# Patient Record
Sex: FEMALE | Race: BLACK OR AFRICAN AMERICAN | Employment: OTHER | URBAN - METROPOLITAN AREA
[De-identification: names, ages, dates, MRNs, and addresses within clinical notes are randomized per-mention and may not be internally consistent; named-entity substitution may affect disease eponyms.]

---

## 2017-04-19 ENCOUNTER — IP HISTORICAL/CONVERTED ENCOUNTER (OUTPATIENT)
Dept: OTHER | Age: 77
End: 2017-04-19

## 2017-05-09 ENCOUNTER — OP HISTORICAL/CONVERTED ENCOUNTER (OUTPATIENT)
Dept: OTHER | Age: 77
End: 2017-05-09

## 2017-06-07 ENCOUNTER — OP HISTORICAL/CONVERTED ENCOUNTER (OUTPATIENT)
Dept: OTHER | Age: 77
End: 2017-06-07

## 2017-09-12 ENCOUNTER — ED HISTORICAL/CONVERTED ENCOUNTER (OUTPATIENT)
Dept: OTHER | Age: 77
End: 2017-09-12

## 2017-09-25 ENCOUNTER — OP HISTORICAL/CONVERTED ENCOUNTER (OUTPATIENT)
Dept: OTHER | Age: 77
End: 2017-09-25

## 2017-10-23 ENCOUNTER — OP HISTORICAL/CONVERTED ENCOUNTER (OUTPATIENT)
Dept: OTHER | Age: 77
End: 2017-10-23

## 2019-03-01 ENCOUNTER — ED HISTORICAL/CONVERTED ENCOUNTER (OUTPATIENT)
Dept: OTHER | Age: 79
End: 2019-03-01

## 2020-02-06 ENCOUNTER — ED HISTORICAL/CONVERTED ENCOUNTER (OUTPATIENT)
Dept: OTHER | Age: 80
End: 2020-02-06

## 2020-06-15 VITALS
HEIGHT: 67 IN | RESPIRATION RATE: 18 BRPM | WEIGHT: 219 LBS | BODY MASS INDEX: 34.37 KG/M2 | DIASTOLIC BLOOD PRESSURE: 80 MMHG | SYSTOLIC BLOOD PRESSURE: 144 MMHG | OXYGEN SATURATION: 98 % | HEART RATE: 76 BPM

## 2020-06-15 PROBLEM — H93.19 TINNITUS: Status: ACTIVE | Noted: 2020-06-15

## 2020-06-15 PROBLEM — H91.90 HEARING LOSS: Status: ACTIVE | Noted: 2020-06-15

## 2020-06-15 RX ORDER — EFAVIRENZ 600 MG/1
600 TABLET, FILM COATED ORAL
COMMUNITY
End: 2022-02-03 | Stop reason: ALTCHOICE

## 2020-06-15 RX ORDER — EMTRICITABINE AND TENOFOVIR DISOPROXIL FUMARATE 200; 300 MG/1; MG/1
TABLET, FILM COATED ORAL DAILY
COMMUNITY
End: 2022-02-03 | Stop reason: ALTCHOICE

## 2020-06-15 RX ORDER — CELECOXIB 200 MG/1
CAPSULE ORAL 2 TIMES DAILY
COMMUNITY
End: 2021-06-18 | Stop reason: ALTCHOICE

## 2020-06-15 RX ORDER — FUROSEMIDE 20 MG/1
TABLET ORAL DAILY
Status: ON HOLD | COMMUNITY
End: 2021-11-02 | Stop reason: SDUPTHER

## 2020-06-15 RX ORDER — SULFAMETHOXAZOLE AND TRIMETHOPRIM 800; 160 MG/1; MG/1
1 TABLET ORAL 2 TIMES DAILY
COMMUNITY
End: 2021-11-04

## 2020-06-15 RX ORDER — ATENOLOL 50 MG/1
50 TABLET ORAL DAILY
COMMUNITY
End: 2021-11-04

## 2020-06-15 RX ORDER — POLYETHYLENE GLYCOL-3350 AND ELECTROLYTES WITH FLAVOR PACK 240; 5.84; 2.98; 6.72; 22.72 G/278.26G; G/278.26G; G/278.26G; G/278.26G; G/278.26G
4 POWDER, FOR SOLUTION ORAL
COMMUNITY
End: 2022-02-03 | Stop reason: ALTCHOICE

## 2020-06-15 RX ORDER — ATORVASTATIN CALCIUM 20 MG/1
20 TABLET, FILM COATED ORAL DAILY
COMMUNITY

## 2020-06-15 RX ORDER — METFORMIN HYDROCHLORIDE 500 MG/1
500 TABLET ORAL
COMMUNITY

## 2020-06-15 RX ORDER — ACETAMINOPHEN AND CODEINE PHOSPHATE 300; 30 MG/1; MG/1
1 TABLET ORAL
COMMUNITY
End: 2022-04-13

## 2020-06-15 RX ORDER — LOSARTAN POTASSIUM AND HYDROCHLOROTHIAZIDE 25; 100 MG/1; MG/1
1 TABLET ORAL DAILY
COMMUNITY
End: 2022-04-13

## 2020-06-15 RX ORDER — HYDROCODONE BITARTRATE AND ACETAMINOPHEN 5; 325 MG/1; MG/1
TABLET ORAL
COMMUNITY
End: 2022-04-13

## 2020-06-15 RX ORDER — OXYBUTYNIN CHLORIDE 5 MG/1
5 TABLET, EXTENDED RELEASE ORAL DAILY
COMMUNITY
End: 2022-04-13

## 2021-04-06 ENCOUNTER — OFFICE VISIT (OUTPATIENT)
Dept: INFECTIOUS DISEASES | Age: 81
End: 2021-04-06
Payer: MEDICARE

## 2021-04-06 VITALS
DIASTOLIC BLOOD PRESSURE: 82 MMHG | OXYGEN SATURATION: 98 % | BODY MASS INDEX: 31.55 KG/M2 | RESPIRATION RATE: 16 BRPM | SYSTOLIC BLOOD PRESSURE: 126 MMHG | TEMPERATURE: 98.1 F | HEART RATE: 66 BPM | HEIGHT: 67 IN | WEIGHT: 201 LBS

## 2021-04-06 DIAGNOSIS — Z79.4 TYPE 2 DIABETES MELLITUS WITH OTHER SPECIFIED COMPLICATION, WITH LONG-TERM CURRENT USE OF INSULIN (HCC): ICD-10-CM

## 2021-04-06 DIAGNOSIS — Z21 ASYMPTOMATIC HIV INFECTION, WITH NO HISTORY OF HIV-RELATED ILLNESS (HCC): Primary | ICD-10-CM

## 2021-04-06 DIAGNOSIS — E11.69 TYPE 2 DIABETES MELLITUS WITH OTHER SPECIFIED COMPLICATION, WITH LONG-TERM CURRENT USE OF INSULIN (HCC): ICD-10-CM

## 2021-04-06 PROCEDURE — 1090F PRES/ABSN URINE INCON ASSESS: CPT | Performed by: INTERNAL MEDICINE

## 2021-04-06 PROCEDURE — 1101F PT FALLS ASSESS-DOCD LE1/YR: CPT | Performed by: INTERNAL MEDICINE

## 2021-04-06 PROCEDURE — G8510 SCR DEP NEG, NO PLAN REQD: HCPCS | Performed by: INTERNAL MEDICINE

## 2021-04-06 PROCEDURE — G8427 DOCREV CUR MEDS BY ELIG CLIN: HCPCS | Performed by: INTERNAL MEDICINE

## 2021-04-06 PROCEDURE — G8536 NO DOC ELDER MAL SCRN: HCPCS | Performed by: INTERNAL MEDICINE

## 2021-04-06 PROCEDURE — G8400 PT W/DXA NO RESULTS DOC: HCPCS | Performed by: INTERNAL MEDICINE

## 2021-04-06 PROCEDURE — 99203 OFFICE O/P NEW LOW 30 MIN: CPT | Performed by: INTERNAL MEDICINE

## 2021-04-06 PROCEDURE — G8417 CALC BMI ABV UP PARAM F/U: HCPCS | Performed by: INTERNAL MEDICINE

## 2021-04-06 RX ORDER — BICTEGRAVIR SODIUM, EMTRICITABINE, AND TENOFOVIR ALAFENAMIDE FUMARATE 50; 200; 25 MG/1; MG/1; MG/1
TABLET ORAL
COMMUNITY
Start: 2021-03-10 | End: 2022-04-13

## 2021-04-06 RX ORDER — HYDROCHLOROTHIAZIDE 25 MG/1
25 TABLET ORAL DAILY
COMMUNITY
Start: 2021-03-01 | End: 2021-11-04

## 2021-04-06 RX ORDER — LOSARTAN POTASSIUM 100 MG/1
50 TABLET ORAL 2 TIMES DAILY
COMMUNITY
End: 2021-11-04

## 2021-04-06 RX ORDER — BICTEGRAVIR SODIUM, EMTRICITABINE, AND TENOFOVIR ALAFENAMIDE FUMARATE 50; 200; 25 MG/1; MG/1; MG/1
1 TABLET ORAL DAILY
Qty: 90 TAB | Refills: 3 | Status: SHIPPED | OUTPATIENT
Start: 2021-04-06 | End: 2022-04-13

## 2021-04-06 RX ORDER — AMLODIPINE BESYLATE 5 MG/1
5 TABLET ORAL DAILY
COMMUNITY
Start: 2021-03-08

## 2021-04-06 NOTE — PROGRESS NOTES
Subjective  Chen Lynch    HPI  80-y.o BF presenting to establish care for HIV mgt. She was a pt of Dr. Eusebia Stubbs who is now retired. According to pt she was diagnosed with HIV infection in 2016, and does not recall any AIDS associated infection. She mentions experiencing unintentional weight loss and generalized weakness prior to her diagnosis with HIV infection. Previous treatment records arent available to review but pt recalls being told her T cell count was < 200 at the time of diagnosis. Her initial regimen was Sustiva and Truvada, recently changed to [email or web address] She is 100% compliant w antiretroviral therapy, denies intolerable side effects. Per pt labs were recently done a month ago, and she was informed everything was fine. She is up to date w influenza and Pneumococcus vaccines, has already received COVID19 vaccine. She also has underlying DM, controlled on meds, HTN, hyperlipidemia and colon Ca. She denies acute complaints on assessment today. ROS  Review of Systems   Constitutional: Negative. Respiratory: Negative. Cardiovascular: Negative. Genitourinary: Negative. Musculoskeletal: Negative. Skin: Negative. Neurological: Negative. Psychiatric/Behavioral: Negative.         Past Medical History:   Diagnosis Date    Cancer [facility])     cancer of colon    Chronic pain     Diabetes ([facility] [address]     Hearing loss 6/15/2020    Hypercholesterolemia     Hypertension     Tinnitus 6/15/2020        Past Surgical History:   Procedure Laterality Date    HX COLONOSCOPY      HX GI      Colon surgery colostomy    HX GYN      hysterectomy        Social History     Tobacco Use    Smoking status: Never Smoker    Smokeless tobacco: Never Used   Substance Use Topics    Alcohol use: Yes     Frequency: Monthly or less     Binge frequency: Never    Drug use: Never        Family History   Problem Relation Age of Onset    Diabetes Mother         No Known Allergies     Objective  Physical Exam  Constitutional:       Appearance: Normal appearance. Cardiovascular:      Rate and Rhythm: Normal rate and regular rhythm. Pulmonary:      Effort: Pulmonary effort is normal.      Breath sounds: Normal breath sounds. Musculoskeletal: Normal range of motion. Skin:     General: Skin is warm and dry. Neurological:      General: No focal deficit present. Mental Status: She is alert and oriented to person, place, and time. Assessment & Plan  Diagnoses and all orders for this visit:    1. Asymptomatic HIV infection, with no history of HIV-related illness ([facility]  -     khkiquveg-nmqzlxhx-dlzvgmc ala (Biktarvy) tab tablet; Take 1 Tab by mouth daily. - Controlled w an undetectable HIV RNA VL per pt, prevous labs not available for review  - Refills for Biktarvy renewed   - Will repeat labs during next encounter in 3 months   - Re-emphasized compliance w cART     2.  Type 2 diabetes mellitus with other specified complication, with long-term current use of insulin ([facility]       - Mgt per primary, Dr Logan Vilma

## 2021-06-18 ENCOUNTER — HOSPITAL ENCOUNTER (EMERGENCY)
Age: 81
Discharge: HOME | End: 2021-06-18
Attending: FAMILY MEDICINE
Payer: MEDICARE

## 2021-06-18 ENCOUNTER — APPOINTMENT (OUTPATIENT)
Dept: GENERAL RADIOLOGY | Age: 81
End: 2021-06-18
Attending: FAMILY MEDICINE
Payer: MEDICARE

## 2021-06-18 VITALS
OXYGEN SATURATION: 100 % | TEMPERATURE: 98 F | HEART RATE: 65 BPM | SYSTOLIC BLOOD PRESSURE: 153 MMHG | RESPIRATION RATE: 15 BRPM | HEIGHT: 67 IN | DIASTOLIC BLOOD PRESSURE: 62 MMHG | WEIGHT: 204 LBS | BODY MASS INDEX: 32.02 KG/M2

## 2021-06-18 DIAGNOSIS — M25.511 PAIN IN JOINT OF RIGHT SHOULDER: Primary | ICD-10-CM

## 2021-06-18 PROCEDURE — 73030 X-RAY EXAM OF SHOULDER: CPT

## 2021-06-18 PROCEDURE — 99282 EMERGENCY DEPT VISIT SF MDM: CPT

## 2021-06-18 RX ORDER — NAPROXEN 375 MG/1
375 TABLET ORAL 2 TIMES DAILY WITH MEALS
Qty: 14 TABLET | Refills: 0 | Status: SHIPPED | OUTPATIENT
Start: 2021-06-18 | End: 2021-06-25

## 2021-06-18 NOTE — ED TRIAGE NOTES
Pt presents with right shoulder pain starting this morning. Pain is present with movement, especially when raising arm above head. No known trauma or injury reported. Denies SOB, CP, nausea, vomiting, or diarrhea.

## 2021-06-18 NOTE — DISCHARGE INSTRUCTIONS
Thank you! Thank you for allowing me to care for you in the emergency department. I sincerely hope that you are satisfied with your visit today. It is my goal to provide you with excellent care. Below you will find a list of your labs and imaging from your visit today. Should you have any questions regarding these results please do not hesitate to call the emergency department. Labs -   No results found for this or any previous visit (from the past 12 hour(s)). Radiologic Studies -   XR SHOULDER RT AP/LAT MIN 2 V   Final Result        CT Results  (Last 48 hours)      None          CXR Results  (Last 48 hours)      None               If you feel that you have not received excellent quality care or timely care, please ask to speak to the nurse manager. Please choose us in the future for your continued health care needs. ------------------------------------------------------------------------------------------------------------  The exam and treatment you received in the Emergency Department were for an urgent problem and are not intended as complete care. It is important that you follow-up with a doctor, nurse practitioner, or physician assistant to:  (1) confirm your diagnosis,  (2) re-evaluation of changes in your illness and treatment, and  (3) for ongoing care. If your symptoms become worse or you do not improve as expected and you are unable to reach your usual health care provider, you should return to the Emergency Department. We are available 24 hours a day. Please take your discharge instructions with you when you go to your follow-up appointment. If you have any problem arranging a follow-up appointment, contact the Emergency Department immediately. If a prescription has been provided, please have it filled as soon as possible to prevent a delay in treatment. Read the entire medication instruction sheet provided to you by the pharmacy.  If you have any questions or reservations about taking the medication due to side effects or interactions with other medications, please call your primary care physician or contact the ER to speak with the charge nurse. Make an appointment with your family doctor or the physician you were referred to for follow-up of this visit as instructed on your discharge paperwork, as this is a mandatory follow-up. Return to the ER if you are unable to be seen or if you are unable to be seen in a timely manner. If you have any problem arranging the follow-up visit, contact the Emergency Department immediately.

## 2021-06-18 NOTE — ED PROVIDER NOTES
EMERGENCY DEPARTMENT HISTORY AND PHYSICAL EXAM      Date: 6/18/2021  Patient Name: Krystin Sheppard    History of Presenting Illness     Chief Complaint   Patient presents with    Shoulder Pain       History Provided By: Patient    HPI: Krystin Sheppard, 80 y.o. female with a past medical history as documented below, presents to the ED with cc of right shoulder pain. Began this morning. Described as a constant achy feeling whenever she moves her shoulder. Alleviated at rest.  It is nonradiating. No recent injuries or falls. She has been lifting heavy groceries in the last few days. No OTC treatment. No numbness and tingling in extremities. No other complaints at this time. There are no other complaints, changes, or physical findings at this time. PCP: Connie Kapoor MD    No current facility-administered medications on file prior to encounter. Current Outpatient Medications on File Prior to Encounter   Medication Sig Dispense Refill    Biktarvy tab tablet       hydroCHLOROthiazide (HYDRODIURIL) 25 mg tablet       amLODIPine (NORVASC) 5 mg tablet       losartan (COZAAR) 100 mg tablet Take 100 mg by mouth daily.  vexajxyni-pahdyfdf-mmczkdp ala (Biktarvy) tab tablet Take 1 Tab by mouth daily. 90 Tab 3    acetaminophen-codeine (TYLENOL #3) 300-30 mg per tablet Take 1 Tab by mouth every four (4) hours as needed for Pain.  atenoloL (TENORMIN) 50 mg tablet Take  by mouth daily.  atorvastatin (LIPITOR) 20 mg tablet Take  by mouth daily.  furosemide (LASIX) 20 mg tablet Take  by mouth daily.  PEG 3350-Electrolytes (Gavilyte-C) 240-22.72-6.72 -5.84 gram solution Take 4 L by mouth now.  HYDROcodone-acetaminophen (NORCO) 5-325 mg per tablet Take  by mouth.  losartan-hydroCHLOROthiazide (HYZAAR) 100-25 mg per tablet Take 1 Tab by mouth daily.  metFORMIN (GLUCOPHAGE) 500 mg tablet Take  by mouth two (2) times daily (with meals).       sAXagliptin (Onglyza) 5 mg tab tablet Take  by mouth daily.  oxybutynin chloride XL (DITROPAN XL) 5 mg CR tablet Take 5 mg by mouth daily.  trimethoprim-sulfamethoxazole (BACTRIM DS, SEPTRA DS) 160-800 mg per tablet Take 1 Tab by mouth two (2) times a day.  efavirenz (Sustiva) 600 mg tablet Take 600 mg by mouth nightly.  emtricitabine-tenofovir, TDF, (Truvada) 200-300 mg per tablet Take  by mouth daily.  [DISCONTINUED] celecoxib (CELEBREX) 200 mg capsule Take  by mouth two (2) times a day. Past History     Past Medical History:  Past Medical History:   Diagnosis Date    Cancer [facility])     cancer of colon    Chronic pain     Diabetes ([facility] [address]     Hearing loss 6/15/2020    Hypercholesterolemia     Hypertension     Tinnitus 6/15/2020       Past Surgical History:  Past Surgical History:   Procedure Laterality Date    HX COLONOSCOPY      HX GI      Colon surgery colostomy    HX GYN      hysterectomy       Family History:  Family History   Problem Relation Age of Onset    Diabetes Mother        Social History:  Social History     Tobacco Use    Smoking status: Never Smoker    Smokeless tobacco: Never Used   Vaping Use    Vaping Use: Never used   Substance Use Topics    Alcohol use: Yes    Drug use: Never       Allergies:  No Known Allergies      Review of Systems     Review of Systems   Constitutional: Negative for chills and fever. HENT: Negative for congestion and sore throat. Eyes: Negative for photophobia and visual disturbance. Respiratory: Negative for cough and shortness of breath. Cardiovascular: Negative for chest pain and palpitations. Gastrointestinal: Negative for nausea and vomiting. Genitourinary: Negative for dysuria and flank pain. Musculoskeletal: Positive for arthralgias (right shoulder). Negative for back pain and myalgias. Skin: Negative for rash and wound. Allergic/Immunologic: Negative for environmental allergies and food allergies.    Neurological: Negative for light-headedness and headaches. All other systems reviewed and are negative. Physical Exam     Physical Exam  Vitals and nursing note reviewed. Constitutional:       Appearance: Normal appearance. She is normal weight. HENT:      Head: Normocephalic and atraumatic. Eyes:      Extraocular Movements: Extraocular movements intact. Conjunctiva/sclera: Conjunctivae normal.   Cardiovascular:      Rate and Rhythm: Normal rate and regular rhythm. Pulses: Normal pulses. Heart sounds: Normal heart sounds. Pulmonary:      Effort: Pulmonary effort is normal.      Breath sounds: Normal breath sounds. Musculoskeletal:         General: No swelling. Normal range of motion. Right shoulder: Normal.      Left shoulder: Normal.        Arms:    Skin:     General: Skin is warm. Capillary Refill: Capillary refill takes less than 2 seconds. Neurological:      General: No focal deficit present. Mental Status: She is alert and oriented to person, place, and time. Psychiatric:         Mood and Affect: Mood normal.         Behavior: Behavior normal.         Thought Content: Thought content normal.         Judgment: Judgment normal.         Lab and Diagnostic Study Results     Labs -   No results found for this or any previous visit (from the past 12 hour(s)). Radiologic Studies -   @lastxrresult@  CT Results  (Last 48 hours)    None        CXR Results  (Last 48 hours)    None            Medical Decision Making   - I am the first provider for this patient. - I reviewed the vital signs, available nursing notes, past medical history, past surgical history, family history and social history. - Initial assessment performed. The patients presenting problems have been discussed, and they are in agreement with the care plan formulated and outlined with them. I have encouraged them to ask questions as they arise throughout their visit.     Vital Signs-Reviewed the patient's vital signs. Patient Vitals for the past 12 hrs:   Temp Pulse Resp BP SpO2   06/18/21 1541 98 °F (36.7 °C) 65 15 (!) 153/62 100 %       Records Reviewed: Nursing Notes    ED Course:          Provider Notes (Medical Decision Making):     MDM  Number of Diagnoses or Management Options  Pain in joint of right shoulder  Diagnosis management comments: 4:54 PM  Patient is stable with no marked toxicity or distress. No significant findings on physical exam. I reviewed all radiographic results with the patient. Per H&P, is likely arthritic pain versus some muscular strain versus inflammation of the bone spur. She was taking Celebrex in the past but does not recall taking any in the recent months. This was likely an old prescription. all questions were answered and there are no apparent barriers to comprehension or communication. The patient verbalized agreement with the diagnosis, treatment plan, and understanding of the follow-up instructions. The patient is appropriate for discharge; leaves the Emergency Department walking with a stable gait. Patient understands to return to the ED for any new or worsening symptoms. Disposition   Disposition: Condition stable  DC- Adult Discharges: All of the diagnostic tests were reviewed and questions answered. Diagnosis, care plan and treatment options were discussed. The patient understands the instructions and will follow up as directed. The patients results have been reviewed with them. They have been counseled regarding their diagnosis. The patient verbally convey understanding and agreement of the signs, symptoms, diagnosis, treatment and prognosis and additionally agrees to follow up as recommended with their PCP in 24 - 48 hours. They also agree with the care-plan and convey that all of their questions have been answered.   I have also put together some discharge instructions for them that include: 1) educational information regarding their diagnosis, 2) how to care for their diagnosis at home, as well a 3) list of reasons why they would want to return to the ED prior to their follow-up appointment, should their condition change. Discharged    DISCHARGE PLAN:  1. Current Discharge Medication List      CONTINUE these medications which have NOT CHANGED    Details   !! Biktarvy tab tablet       hydroCHLOROthiazide (HYDRODIURIL) 25 mg tablet       amLODIPine (NORVASC) 5 mg tablet       losartan (COZAAR) 100 mg tablet Take 100 mg by mouth daily. !! qybnerkhk-mstbkdmw-xnyegfz ala (Biktarvy) tab tablet Take 1 Tab by mouth daily. Qty: 90 Tab, Refills: 3    Associated Diagnoses: Asymptomatic HIV infection, with no history of HIV-related illness (HCC)      acetaminophen-codeine (TYLENOL #3) 300-30 mg per tablet Take 1 Tab by mouth every four (4) hours as needed for Pain. atenoloL (TENORMIN) 50 mg tablet Take  by mouth daily. atorvastatin (LIPITOR) 20 mg tablet Take  by mouth daily. celecoxib (CELEBREX) 200 mg capsule Take  by mouth two (2) times a day. furosemide (LASIX) 20 mg tablet Take  by mouth daily. PEG 3350-Electrolytes (Gavilyte-C) 240-22.72-6.72 -5.84 gram solution Take 4 L by mouth now. HYDROcodone-acetaminophen (NORCO) 5-325 mg per tablet Take  by mouth.      losartan-hydroCHLOROthiazide (HYZAAR) 100-25 mg per tablet Take 1 Tab by mouth daily. metFORMIN (GLUCOPHAGE) 500 mg tablet Take  by mouth two (2) times daily (with meals). sAXagliptin (Onglyza) 5 mg tab tablet Take  by mouth daily. oxybutynin chloride XL (DITROPAN XL) 5 mg CR tablet Take 5 mg by mouth daily. trimethoprim-sulfamethoxazole (BACTRIM DS, SEPTRA DS) 160-800 mg per tablet Take 1 Tab by mouth two (2) times a day. efavirenz (Sustiva) 600 mg tablet Take 600 mg by mouth nightly. emtricitabine-tenofovir, TDF, (Truvada) 200-300 mg per tablet Take  by mouth daily. !! - Potential duplicate medications found. Please discuss with provider. 2.   Follow-up Information     Follow up With Specialties Details Why Contact Info    Seth Hurst MD Internal Medicine Schedule an appointment as soon as possible for a visit in 3 days  [address] [facility]  [phone]          3. Return to ED if worse   4. Current Discharge Medication List      START taking these medications    Details   naproxen (NAPROSYN) 375 mg tablet Take 1 Tablet by mouth two (2) times daily (with meals) for 7 days. Qty: 14 Tablet, Refills: 0  Start date: 6/18/2021, End date: 6/25/2021               Diagnosis     Clinical Impression:   1. Pain in joint of right shoulder        Attestations:    Chun Torres, DO    Please note that this dictation was completed with [employer], the computer voice recognition software. Quite often unanticipated grammatical, syntax, homophones, and other interpretive errors are inadvertently transcribed by the computer software. Please disregard these errors. Please excuse any errors that have escaped final proofreading. Thank you.

## 2021-07-06 ENCOUNTER — OFFICE VISIT (OUTPATIENT)
Dept: INFECTIOUS DISEASES | Age: 81
End: 2021-07-06
Payer: MEDICARE

## 2021-07-06 VITALS
HEIGHT: 67 IN | WEIGHT: 193 LBS | TEMPERATURE: 97.7 F | HEART RATE: 63 BPM | RESPIRATION RATE: 15 BRPM | OXYGEN SATURATION: 96 % | DIASTOLIC BLOOD PRESSURE: 80 MMHG | SYSTOLIC BLOOD PRESSURE: 126 MMHG | BODY MASS INDEX: 30.29 KG/M2

## 2021-07-06 DIAGNOSIS — B20 HIV INFECTION, UNSPECIFIED SYMPTOM STATUS (HCC): Primary | ICD-10-CM

## 2021-07-06 PROCEDURE — 1101F PT FALLS ASSESS-DOCD LE1/YR: CPT | Performed by: INTERNAL MEDICINE

## 2021-07-06 PROCEDURE — G8427 DOCREV CUR MEDS BY ELIG CLIN: HCPCS | Performed by: INTERNAL MEDICINE

## 2021-07-06 PROCEDURE — 1090F PRES/ABSN URINE INCON ASSESS: CPT | Performed by: INTERNAL MEDICINE

## 2021-07-06 PROCEDURE — G8536 NO DOC ELDER MAL SCRN: HCPCS | Performed by: INTERNAL MEDICINE

## 2021-07-06 PROCEDURE — 99213 OFFICE O/P EST LOW 20 MIN: CPT | Performed by: INTERNAL MEDICINE

## 2021-07-06 PROCEDURE — G8510 SCR DEP NEG, NO PLAN REQD: HCPCS | Performed by: INTERNAL MEDICINE

## 2021-07-06 PROCEDURE — G8400 PT W/DXA NO RESULTS DOC: HCPCS | Performed by: INTERNAL MEDICINE

## 2021-07-06 PROCEDURE — G8417 CALC BMI ABV UP PARAM F/U: HCPCS | Performed by: INTERNAL MEDICINE

## 2021-07-06 NOTE — PROGRESS NOTES
Subjective  Eleni Bates     HPI  Pleasant 80 y.o BF seen as a new pt in 04/2021 for mgt of HIV infection. She presents for a 3 month f/u, denies acute complains since last encounter. She admits to 100% compliance w cART, Biktarvy, denies experiencing adverse side effects. Pt denies acute complaints on assessment today, except for intermittent leg swelling. ROS  Review of Systems   Constitutional: Negative. HENT: Negative. Respiratory: Negative. Cardiovascular: Negative. Gastrointestinal: Negative. Genitourinary: Negative. Musculoskeletal: Negative. Skin: Negative. Neurological: Negative. Past Medical History:   Diagnosis Date    Cancer [facility])     cancer of colon    Chronic pain     Diabetes ([facility] [address]     Hearing loss 6/15/2020    Hypercholesterolemia     Hypertension     Tinnitus 6/15/2020        Past Surgical History:   Procedure Laterality Date    HX COLONOSCOPY      HX GI      Colon surgery colostomy    HX GYN      hysterectomy        Social History     Tobacco Use    Smoking status: Never Smoker    Smokeless tobacco: Never Used   Vaping Use    Vaping Use: Never used   Substance Use Topics    Alcohol use: Yes    Drug use: Never        Family History   Problem Relation Age of Onset    Diabetes Mother         No Known Allergies     Objective  Physical Exam  Constitutional:       Appearance: Normal appearance. Cardiovascular:      Rate and Rhythm: Normal rate and regular rhythm. Pulses: Normal pulses. Heart sounds: Normal heart sounds. Pulmonary:      Effort: Pulmonary effort is normal.      Breath sounds: Normal breath sounds. Abdominal:      General: Abdomen is flat. Palpations: Abdomen is soft. Musculoskeletal:         General: Swelling present. Skin:     General: Skin is warm and dry. Neurological:      Mental Status: She is alert. Assessment & Plan  Diagnoses and all orders for this visit:    1.  HIV infection, unspecified symptom status (HCC)  -     METABOLIC PANEL, COMPREHENSIVE  -     HIV-1 RNA QT BY PCR  -     LYMPHOCYTES, CD4 PERCENT AND ABSOLUTE  -Well controlled on Biktarvy with an undetectable HIV RNA VL   -Repeat routine labs as above   -Currently has enough refills on  Biktarvy, will renew once pt runs out   -Counseled on medication compliance

## 2021-07-14 LAB
ALBUMIN SERPL-MCNC: 4 G/DL (ref 3.7–4.7)
ALBUMIN/GLOB SERPL: 1.4 {RATIO} (ref 1.2–2.2)
ALP SERPL-CCNC: 74 IU/L (ref 48–121)
ALT SERPL-CCNC: 16 IU/L (ref 0–32)
AST SERPL-CCNC: 25 IU/L (ref 0–40)
BASOPHILS # BLD AUTO: 0 X10E3/UL (ref 0–0.2)
BASOPHILS NFR BLD AUTO: 1 %
BILIRUB SERPL-MCNC: 0.4 MG/DL (ref 0–1.2)
BUN SERPL-MCNC: 16 MG/DL (ref 8–27)
BUN/CREAT SERPL: 21 (ref 12–28)
CALCIUM SERPL-MCNC: 9.9 MG/DL (ref 8.7–10.3)
CD3+CD4+ CELLS # BLD: 413 /UL (ref 359–1519)
CD3+CD4+ CELLS NFR BLD: 29.5 % (ref 30.8–58.5)
CHLORIDE SERPL-SCNC: 105 MMOL/L (ref 96–106)
CO2 SERPL-SCNC: 25 MMOL/L (ref 20–29)
CREAT SERPL-MCNC: 0.78 MG/DL (ref 0.57–1)
EOSINOPHIL # BLD AUTO: 0 X10E3/UL (ref 0–0.4)
EOSINOPHIL NFR BLD AUTO: 1 %
ERYTHROCYTE [DISTWIDTH] IN BLOOD BY AUTOMATED COUNT: 14.9 % (ref 11.7–15.4)
GLOBULIN SER CALC-MCNC: 2.8 G/DL (ref 1.5–4.5)
GLUCOSE SERPL-MCNC: 97 MG/DL (ref 65–99)
HCT VFR BLD AUTO: 36.5 % (ref 34–46.6)
HGB BLD-MCNC: 11.8 G/DL (ref 11.1–15.9)
HIV1 RNA # SERPL NAA+PROBE: 260 COPIES/ML
HIV1 RNA SERPL NAA+PROBE-LOG#: 2.42 LOG10COPY/ML
IMM GRANULOCYTES # BLD AUTO: 0 X10E3/UL (ref 0–0.1)
IMM GRANULOCYTES NFR BLD AUTO: 0 %
LYMPHOCYTES # BLD AUTO: 1.4 X10E3/UL (ref 0.7–3.1)
LYMPHOCYTES NFR BLD AUTO: 34 %
MCH RBC QN AUTO: 27.8 PG (ref 26.6–33)
MCHC RBC AUTO-ENTMCNC: 32.3 G/DL (ref 31.5–35.7)
MCV RBC AUTO: 86 FL (ref 79–97)
MONOCYTES # BLD AUTO: 0.4 X10E3/UL (ref 0.1–0.9)
MONOCYTES NFR BLD AUTO: 9 %
NEUTROPHILS # BLD AUTO: 2.3 X10E3/UL (ref 1.4–7)
NEUTROPHILS NFR BLD AUTO: 55 %
PLATELET # BLD AUTO: 215 X10E3/UL (ref 150–450)
POTASSIUM SERPL-SCNC: 3.8 MMOL/L (ref 3.5–5.2)
PROT SERPL-MCNC: 6.8 G/DL (ref 6–8.5)
RBC # BLD AUTO: 4.25 X10E6/UL (ref 3.77–5.28)
SODIUM SERPL-SCNC: 142 MMOL/L (ref 134–144)
WBC # BLD AUTO: 4.1 X10E3/UL (ref 3.4–10.8)

## 2021-09-21 ENCOUNTER — TRANSCRIBE ORDER (OUTPATIENT)
Dept: SCHEDULING | Age: 81
End: 2021-09-21

## 2021-09-21 DIAGNOSIS — K46.9 HERNIA, ABDOMINAL: Primary | ICD-10-CM

## 2021-09-29 ENCOUNTER — HOSPITAL ENCOUNTER (OUTPATIENT)
Dept: CT IMAGING | Age: 81
Discharge: HOME | End: 2021-09-29
Attending: SURGERY
Payer: MEDICARE

## 2021-09-29 DIAGNOSIS — K46.9 HERNIA, ABDOMINAL: ICD-10-CM

## 2021-09-29 LAB
BUN SERPL-MCNC: 15 MG/DL (ref 6–20)
CREAT SERPL-MCNC: 0.62 MG/DL (ref 0.55–1.02)

## 2021-09-29 PROCEDURE — 74177 CT ABD & PELVIS W/CONTRAST: CPT

## 2021-09-29 PROCEDURE — 36415 COLL VENOUS BLD VENIPUNCTURE: CPT

## 2021-09-29 PROCEDURE — 82565 ASSAY OF CREATININE: CPT

## 2021-09-29 PROCEDURE — 74011000636 HC RX REV CODE- 636: Performed by: SURGERY

## 2021-09-29 PROCEDURE — 84520 ASSAY OF UREA NITROGEN: CPT

## 2021-09-29 RX ADMIN — IOPAMIDOL 100 ML: 755 INJECTION, SOLUTION INTRAVENOUS at 10:37

## 2021-10-26 ENCOUNTER — HOSPITAL ENCOUNTER (OUTPATIENT)
Dept: PREADMISSION TESTING | Age: 81
Discharge: HOME | DRG: 329 | End: 2021-10-26
Payer: MEDICARE

## 2021-10-26 VITALS
SYSTOLIC BLOOD PRESSURE: 158 MMHG | WEIGHT: 197.53 LBS | TEMPERATURE: 98.1 F | HEART RATE: 67 BPM | HEIGHT: 65 IN | RESPIRATION RATE: 16 BRPM | DIASTOLIC BLOOD PRESSURE: 71 MMHG | BODY MASS INDEX: 32.91 KG/M2

## 2021-10-26 LAB
ABO + RH BLD: NORMAL
ATRIAL RATE: 61 BPM
BLOOD GROUP ANTIBODIES SERPL: NEGATIVE
CALCULATED P AXIS, ECG09: 78 DEGREES
CALCULATED R AXIS, ECG10: -4 DEGREES
CALCULATED T AXIS, ECG11: 24 DEGREES
DIAGNOSIS, 93000: NORMAL
ERYTHROCYTE [DISTWIDTH] IN BLOOD BY AUTOMATED COUNT: 14.4 % (ref 11.5–14.5)
HCT VFR BLD AUTO: 39.2 % (ref 35–47)
HGB BLD-MCNC: 12.3 G/DL (ref 11.5–16)
MCH RBC QN AUTO: 27.5 PG (ref 26–34)
MCHC RBC AUTO-ENTMCNC: 31.4 G/DL (ref 30–36.5)
MCV RBC AUTO: 87.5 FL (ref 80–99)
NRBC # BLD: 0 K/UL (ref 0–0.01)
NRBC BLD-RTO: 0 PER 100 WBC
P-R INTERVAL, ECG05: 82 MS
PLATELET # BLD AUTO: 209 K/UL (ref 150–400)
PMV BLD AUTO: 10.1 FL (ref 8.9–12.9)
Q-T INTERVAL, ECG07: 392 MS
QRS DURATION, ECG06: 84 MS
QTC CALCULATION (BEZET), ECG08: 394 MS
RBC # BLD AUTO: 4.48 M/UL (ref 3.8–5.2)
SARS-COV-2, COV2: NORMAL
SPECIMEN EXP DATE BLD: NORMAL
VENTRICULAR RATE, ECG03: 61 BPM
WBC # BLD AUTO: 4 K/UL (ref 3.6–11)

## 2021-10-26 PROCEDURE — 86901 BLOOD TYPING SEROLOGIC RH(D): CPT

## 2021-10-26 PROCEDURE — 93005 ELECTROCARDIOGRAM TRACING: CPT

## 2021-10-26 PROCEDURE — 85027 COMPLETE CBC AUTOMATED: CPT

## 2021-10-26 PROCEDURE — U0003 INFECTIOUS AGENT DETECTION BY NUCLEIC ACID (DNA OR RNA); SEVERE ACUTE RESPIRATORY SYNDROME CORONAVIRUS 2 (SARS-COV-2) (CORONAVIRUS DISEASE [COVID-19]), AMPLIFIED PROBE TECHNIQUE, MAKING USE OF HIGH THROUGHPUT TECHNOLOGIES AS DESCRIBED BY CMS-2020-01-R: HCPCS

## 2021-10-26 RX ORDER — ACETAMINOPHEN 500 MG
500 TABLET ORAL DAILY
COMMUNITY

## 2021-10-26 NOTE — PERIOP NOTES
[id number] 's office called, spoke with Ms Sergei Quevedo  re: bowel prep. Ms Zuhair Martinez stated she will ask Dr. Zuhair Martinez and call patient with instructions if needed.

## 2021-10-26 NOTE — PERIOP NOTES
[id number] Dr. Niru Marie called, made aware of pt's history and ekg done today in PAT dept. No new orders given, stated ok to proceed with surgery as planned.

## 2021-10-27 LAB
SARS-COV-2, XPLCVT: NOT DETECTED
SOURCE, COVRS: NORMAL

## 2021-10-28 ENCOUNTER — HOSPITAL ENCOUNTER (INPATIENT)
Age: 81
LOS: 7 days | Discharge: HOME | DRG: 329 | End: 2021-11-04
Attending: SURGERY | Admitting: SURGERY
Payer: MEDICARE

## 2021-10-28 ENCOUNTER — ANESTHESIA EVENT (OUTPATIENT)
Dept: SURGERY | Age: 81
DRG: 329 | End: 2021-10-28
Payer: MEDICARE

## 2021-10-28 ENCOUNTER — ANESTHESIA (OUTPATIENT)
Dept: SURGERY | Age: 81
DRG: 329 | End: 2021-10-28
Payer: MEDICARE

## 2021-10-28 PROBLEM — Z98.890 S/P EXPLORATORY LAPAROTOMY: Status: ACTIVE | Noted: 2021-10-28

## 2021-10-28 PROBLEM — K43.3 OBSTRUCTION DUE TO PARASTOMAL HERNIA: Status: ACTIVE | Noted: 2021-10-28

## 2021-10-28 LAB
GLUCOSE BLD STRIP.AUTO-MCNC: 114 MG/DL (ref 65–117)
PERFORMED BY, TECHID: NORMAL

## 2021-10-28 PROCEDURE — 76010000139 HC OR TIME 5.5 TO 6 HR: Performed by: SURGERY

## 2021-10-28 PROCEDURE — 77030005515 HC CATH URETH FOL14 BARD -B: Performed by: SURGERY

## 2021-10-28 PROCEDURE — 76060000042 HC ANESTHESIA 5.5 TO 6 HR: Performed by: SURGERY

## 2021-10-28 PROCEDURE — 77030002966 HC SUT PDS J&J -A: Performed by: SURGERY

## 2021-10-28 PROCEDURE — 88307 TISSUE EXAM BY PATHOLOGIST: CPT

## 2021-10-28 PROCEDURE — 77030011278 HC ELECTRD LIG IMPT COVD -F: Performed by: SURGERY

## 2021-10-28 PROCEDURE — 77030008462 HC STPLR SKN PROX J&J -A: Performed by: SURGERY

## 2021-10-28 PROCEDURE — 0WQF0ZZ REPAIR ABDOMINAL WALL, OPEN APPROACH: ICD-10-PCS | Performed by: SURGERY

## 2021-10-28 PROCEDURE — 77030008463 HC STPLR SKN PROX J&J -B: Performed by: SURGERY

## 2021-10-28 PROCEDURE — 2709999900 HC NON-CHARGEABLE SUPPLY: Performed by: SURGERY

## 2021-10-28 PROCEDURE — 77030002986 HC SUT PROL J&J -A: Performed by: SURGERY

## 2021-10-28 PROCEDURE — 76210000006 HC OR PH I REC 0.5 TO 1 HR: Performed by: SURGERY

## 2021-10-28 PROCEDURE — P9045 ALBUMIN (HUMAN), 5%, 250 ML: HCPCS | Performed by: NURSE ANESTHETIST, CERTIFIED REGISTERED

## 2021-10-28 PROCEDURE — 77030002996 HC SUT SLK J&J -A: Performed by: SURGERY

## 2021-10-28 PROCEDURE — 87086 URINE CULTURE/COLONY COUNT: CPT

## 2021-10-28 PROCEDURE — 36415 COLL VENOUS BLD VENIPUNCTURE: CPT

## 2021-10-28 PROCEDURE — 0DNE0ZZ RELEASE LARGE INTESTINE, OPEN APPROACH: ICD-10-PCS | Performed by: SURGERY

## 2021-10-28 PROCEDURE — 77030002916 HC SUT ETHLN J&J -A: Performed by: SURGERY

## 2021-10-28 PROCEDURE — 88304 TISSUE EXAM BY PATHOLOGIST: CPT

## 2021-10-28 PROCEDURE — 74011000250 HC RX REV CODE- 250: Performed by: NURSE ANESTHETIST, CERTIFIED REGISTERED

## 2021-10-28 PROCEDURE — 65270000029 HC RM PRIVATE

## 2021-10-28 PROCEDURE — 0DTG0ZZ RESECTION OF LEFT LARGE INTESTINE, OPEN APPROACH: ICD-10-PCS | Performed by: SURGERY

## 2021-10-28 PROCEDURE — 74011250636 HC RX REV CODE- 250/636: Performed by: NURSE ANESTHETIST, CERTIFIED REGISTERED

## 2021-10-28 PROCEDURE — 74011000258 HC RX REV CODE- 258: Performed by: SURGERY

## 2021-10-28 PROCEDURE — 74011250636 HC RX REV CODE- 250/636: Performed by: SURGERY

## 2021-10-28 PROCEDURE — 77030031139 HC SUT VCRL2 J&J -A: Performed by: SURGERY

## 2021-10-28 PROCEDURE — 74011000250 HC RX REV CODE- 250: Performed by: SURGERY

## 2021-10-28 PROCEDURE — 0D1M0Z4 BYPASS DESCENDING COLON TO CUTANEOUS, OPEN APPROACH: ICD-10-PCS | Performed by: SURGERY

## 2021-10-28 PROCEDURE — 77030012935 HC DRSG AQUACEL BMS -B: Performed by: SURGERY

## 2021-10-28 PROCEDURE — 88302 TISSUE EXAM BY PATHOLOGIST: CPT

## 2021-10-28 PROCEDURE — 77030011264 HC ELECTRD BLD EXT COVD -A: Performed by: SURGERY

## 2021-10-28 PROCEDURE — 77030036731 HC STPLR ENDOSC J&J -F: Performed by: SURGERY

## 2021-10-28 PROCEDURE — 81001 URINALYSIS AUTO W/SCOPE: CPT

## 2021-10-28 PROCEDURE — 82962 GLUCOSE BLOOD TEST: CPT

## 2021-10-28 PROCEDURE — 77030040361 HC SLV COMPR DVT MDII -B: Performed by: SURGERY

## 2021-10-28 RX ORDER — CEFOXITIN 2 G/1
INJECTION, POWDER, FOR SOLUTION INTRAVENOUS
Status: DISPENSED
Start: 2021-10-28 | End: 2021-10-29

## 2021-10-28 RX ORDER — HYDROMORPHONE HYDROCHLORIDE 1 MG/ML
0.4 INJECTION, SOLUTION INTRAMUSCULAR; INTRAVENOUS; SUBCUTANEOUS
Status: DISCONTINUED | OUTPATIENT
Start: 2021-10-28 | End: 2021-10-30

## 2021-10-28 RX ORDER — BUPIVACAINE HYDROCHLORIDE 2.5 MG/ML
INJECTION, SOLUTION EPIDURAL; INFILTRATION; INTRACAUDAL AS NEEDED
Status: DISCONTINUED | OUTPATIENT
Start: 2021-10-28 | End: 2021-10-28 | Stop reason: HOSPADM

## 2021-10-28 RX ORDER — SODIUM CHLORIDE 0.9 % (FLUSH) 0.9 %
5-40 SYRINGE (ML) INJECTION AS NEEDED
Status: DISCONTINUED | OUTPATIENT
Start: 2021-10-28 | End: 2021-10-30

## 2021-10-28 RX ORDER — LIDOCAINE HYDROCHLORIDE 10 MG/ML
0.1 INJECTION, SOLUTION EPIDURAL; INFILTRATION; INTRACAUDAL; PERINEURAL AS NEEDED
Status: DISCONTINUED | OUTPATIENT
Start: 2021-10-28 | End: 2021-10-28 | Stop reason: HOSPADM

## 2021-10-28 RX ORDER — DIPHENHYDRAMINE HYDROCHLORIDE 50 MG/ML
12.5 INJECTION, SOLUTION INTRAMUSCULAR; INTRAVENOUS AS NEEDED
Status: ACTIVE | OUTPATIENT
Start: 2021-10-28 | End: 2021-10-28

## 2021-10-28 RX ORDER — SODIUM CHLORIDE, SODIUM LACTATE, POTASSIUM CHLORIDE, CALCIUM CHLORIDE 600; 310; 30; 20 MG/100ML; MG/100ML; MG/100ML; MG/100ML
125 INJECTION, SOLUTION INTRAVENOUS CONTINUOUS
Status: DISCONTINUED | OUTPATIENT
Start: 2021-10-28 | End: 2021-10-31

## 2021-10-28 RX ORDER — FENTANYL CITRATE 50 UG/ML
50 INJECTION, SOLUTION INTRAMUSCULAR; INTRAVENOUS
Status: DISCONTINUED | OUTPATIENT
Start: 2021-10-28 | End: 2021-10-30

## 2021-10-28 RX ORDER — SODIUM CHLORIDE, SODIUM LACTATE, POTASSIUM CHLORIDE, CALCIUM CHLORIDE 600; 310; 30; 20 MG/100ML; MG/100ML; MG/100ML; MG/100ML
INJECTION, SOLUTION INTRAVENOUS
Status: DISCONTINUED | OUTPATIENT
Start: 2021-10-28 | End: 2021-10-28 | Stop reason: HOSPADM

## 2021-10-28 RX ORDER — PROPOFOL 10 MG/ML
INJECTION, EMULSION INTRAVENOUS AS NEEDED
Status: DISCONTINUED | OUTPATIENT
Start: 2021-10-28 | End: 2021-10-28 | Stop reason: HOSPADM

## 2021-10-28 RX ORDER — ONDANSETRON 2 MG/ML
INJECTION INTRAMUSCULAR; INTRAVENOUS AS NEEDED
Status: DISCONTINUED | OUTPATIENT
Start: 2021-10-28 | End: 2021-10-28 | Stop reason: HOSPADM

## 2021-10-28 RX ORDER — ONDANSETRON 2 MG/ML
4 INJECTION INTRAMUSCULAR; INTRAVENOUS AS NEEDED
Status: DISCONTINUED | OUTPATIENT
Start: 2021-10-28 | End: 2021-10-30

## 2021-10-28 RX ORDER — ROCURONIUM BROMIDE 10 MG/ML
INJECTION, SOLUTION INTRAVENOUS AS NEEDED
Status: DISCONTINUED | OUTPATIENT
Start: 2021-10-28 | End: 2021-10-28 | Stop reason: HOSPADM

## 2021-10-28 RX ORDER — SODIUM CHLORIDE, SODIUM LACTATE, POTASSIUM CHLORIDE, CALCIUM CHLORIDE 600; 310; 30; 20 MG/100ML; MG/100ML; MG/100ML; MG/100ML
20 INJECTION, SOLUTION INTRAVENOUS CONTINUOUS
Status: DISCONTINUED | OUTPATIENT
Start: 2021-10-28 | End: 2021-10-30

## 2021-10-28 RX ORDER — ALBUMIN HUMAN 50 G/1000ML
SOLUTION INTRAVENOUS AS NEEDED
Status: DISCONTINUED | OUTPATIENT
Start: 2021-10-28 | End: 2021-10-28 | Stop reason: HOSPADM

## 2021-10-28 RX ORDER — SODIUM CHLORIDE 900 MG/100ML
INJECTION INTRAVENOUS
Status: DISPENSED
Start: 2021-10-28 | End: 2021-10-29

## 2021-10-28 RX ORDER — SODIUM CHLORIDE 0.9 % (FLUSH) 0.9 %
5-40 SYRINGE (ML) INJECTION AS NEEDED
Status: DISCONTINUED | OUTPATIENT
Start: 2021-10-28 | End: 2021-10-28 | Stop reason: HOSPADM

## 2021-10-28 RX ORDER — MIDAZOLAM HYDROCHLORIDE 1 MG/ML
0.5 INJECTION, SOLUTION INTRAMUSCULAR; INTRAVENOUS
Status: DISCONTINUED | OUTPATIENT
Start: 2021-10-28 | End: 2021-10-30

## 2021-10-28 RX ORDER — DEXAMETHASONE SODIUM PHOSPHATE 4 MG/ML
INJECTION, SOLUTION INTRA-ARTICULAR; INTRALESIONAL; INTRAMUSCULAR; INTRAVENOUS; SOFT TISSUE AS NEEDED
Status: DISCONTINUED | OUTPATIENT
Start: 2021-10-28 | End: 2021-10-28 | Stop reason: HOSPADM

## 2021-10-28 RX ORDER — EPHEDRINE SULFATE/0.9% NACL/PF 50 MG/5 ML
SYRINGE (ML) INTRAVENOUS AS NEEDED
Status: DISCONTINUED | OUTPATIENT
Start: 2021-10-28 | End: 2021-10-28 | Stop reason: HOSPADM

## 2021-10-28 RX ORDER — HYDROMORPHONE HYDROCHLORIDE 2 MG/ML
INJECTION, SOLUTION INTRAMUSCULAR; INTRAVENOUS; SUBCUTANEOUS AS NEEDED
Status: DISCONTINUED | OUTPATIENT
Start: 2021-10-28 | End: 2021-10-28 | Stop reason: HOSPADM

## 2021-10-28 RX ORDER — ONDANSETRON 2 MG/ML
4 INJECTION INTRAMUSCULAR; INTRAVENOUS
Status: DISCONTINUED | OUTPATIENT
Start: 2021-10-28 | End: 2021-11-04 | Stop reason: HOSPADM

## 2021-10-28 RX ORDER — SODIUM CHLORIDE 0.9 % (FLUSH) 0.9 %
5-40 SYRINGE (ML) INJECTION EVERY 8 HOURS
Status: DISCONTINUED | OUTPATIENT
Start: 2021-10-28 | End: 2021-10-28 | Stop reason: HOSPADM

## 2021-10-28 RX ORDER — HYDROCODONE BITARTRATE AND ACETAMINOPHEN 5; 325 MG/1; MG/1
1 TABLET ORAL AS NEEDED
Status: DISCONTINUED | OUTPATIENT
Start: 2021-10-28 | End: 2021-10-30

## 2021-10-28 RX ORDER — SODIUM CHLORIDE 0.9 % (FLUSH) 0.9 %
5-40 SYRINGE (ML) INJECTION EVERY 8 HOURS
Status: DISCONTINUED | OUTPATIENT
Start: 2021-10-28 | End: 2021-10-30

## 2021-10-28 RX ORDER — MORPHINE SULFATE 10 MG/ML
2 INJECTION, SOLUTION INTRAMUSCULAR; INTRAVENOUS
Status: DISCONTINUED | OUTPATIENT
Start: 2021-10-28 | End: 2021-10-30

## 2021-10-28 RX ORDER — LIDOCAINE HYDROCHLORIDE 20 MG/ML
INJECTION, SOLUTION EPIDURAL; INFILTRATION; INTRACAUDAL; PERINEURAL AS NEEDED
Status: DISCONTINUED | OUTPATIENT
Start: 2021-10-28 | End: 2021-10-28 | Stop reason: HOSPADM

## 2021-10-28 RX ORDER — SUCCINYLCHOLINE CHLORIDE 20 MG/ML
INJECTION INTRAMUSCULAR; INTRAVENOUS AS NEEDED
Status: DISCONTINUED | OUTPATIENT
Start: 2021-10-28 | End: 2021-10-28 | Stop reason: HOSPADM

## 2021-10-28 RX ORDER — FENTANYL CITRATE 50 UG/ML
INJECTION, SOLUTION INTRAMUSCULAR; INTRAVENOUS AS NEEDED
Status: DISCONTINUED | OUTPATIENT
Start: 2021-10-28 | End: 2021-10-28 | Stop reason: HOSPADM

## 2021-10-28 RX ORDER — NORETHINDRONE AND ETHINYL ESTRADIOL 0.5-0.035
5 KIT ORAL AS NEEDED
Status: DISCONTINUED | OUTPATIENT
Start: 2021-10-28 | End: 2021-10-30

## 2021-10-28 RX ADMIN — LIDOCAINE HYDROCHLORIDE 20 MG: 20 INJECTION, SOLUTION EPIDURAL; INFILTRATION; INTRACAUDAL; PERINEURAL at 15:50

## 2021-10-28 RX ADMIN — SODIUM CHLORIDE 2 G: 900 INJECTION INTRAVENOUS at 20:04

## 2021-10-28 RX ADMIN — Medication 10 MG: at 16:46

## 2021-10-28 RX ADMIN — PHENYLEPHRINE HYDROCHLORIDE 200 MCG: 10 INJECTION INTRAVENOUS at 20:14

## 2021-10-28 RX ADMIN — SUCCINYLCHOLINE CHLORIDE 160 MG: 20 INJECTION, SOLUTION INTRAMUSCULAR; INTRAVENOUS at 15:50

## 2021-10-28 RX ADMIN — Medication 10 ML: at 22:45

## 2021-10-28 RX ADMIN — HYDROMORPHONE HYDROCHLORIDE 1.5 MG: 2 INJECTION INTRAMUSCULAR; INTRAVENOUS; SUBCUTANEOUS at 16:36

## 2021-10-28 RX ADMIN — Medication 10 MG: at 17:20

## 2021-10-28 RX ADMIN — Medication 10 MG: at 21:04

## 2021-10-28 RX ADMIN — ROCURONIUM BROMIDE 50 MG: 50 INJECTION, SOLUTION INTRAVENOUS at 15:57

## 2021-10-28 RX ADMIN — SODIUM CHLORIDE, POTASSIUM CHLORIDE, SODIUM LACTATE AND CALCIUM CHLORIDE 20 ML/HR: 600; 310; 30; 20 INJECTION, SOLUTION INTRAVENOUS at 14:18

## 2021-10-28 RX ADMIN — SODIUM CHLORIDE, POTASSIUM CHLORIDE, SODIUM LACTATE AND CALCIUM CHLORIDE: 600; 310; 30; 20 INJECTION, SOLUTION INTRAVENOUS at 15:43

## 2021-10-28 RX ADMIN — ALBUMIN (HUMAN) 12.5 G: 12.5 INJECTION, SOLUTION INTRAVENOUS at 16:49

## 2021-10-28 RX ADMIN — PIPERACILLIN AND TAZOBACTAM 3.38 G: 3; .375 INJECTION, POWDER, LYOPHILIZED, FOR SOLUTION INTRAVENOUS at 22:45

## 2021-10-28 RX ADMIN — PHENYLEPHRINE HYDROCHLORIDE 200 MCG: 10 INJECTION INTRAVENOUS at 16:04

## 2021-10-28 RX ADMIN — ONDANSETRON 4 MG: 2 INJECTION INTRAMUSCULAR; INTRAVENOUS at 20:29

## 2021-10-28 RX ADMIN — PROPOFOL 160 MG: 10 INJECTION, EMULSION INTRAVENOUS at 15:50

## 2021-10-28 RX ADMIN — HYDROMORPHONE HYDROCHLORIDE 0.5 MG: 2 INJECTION INTRAMUSCULAR; INTRAVENOUS; SUBCUTANEOUS at 17:44

## 2021-10-28 RX ADMIN — DEXAMETHASONE SODIUM PHOSPHATE 4 MG: 4 INJECTION, SOLUTION INTRA-ARTICULAR; INTRALESIONAL; INTRAMUSCULAR; INTRAVENOUS; SOFT TISSUE at 20:31

## 2021-10-28 RX ADMIN — SODIUM CHLORIDE 2 G: 900 INJECTION INTRAVENOUS at 15:58

## 2021-10-28 RX ADMIN — PHENYLEPHRINE HYDROCHLORIDE 200 MCG: 10 INJECTION INTRAVENOUS at 20:45

## 2021-10-28 RX ADMIN — SODIUM CHLORIDE 2 G: 900 INJECTION INTRAVENOUS at 18:04

## 2021-10-28 RX ADMIN — FENTANYL CITRATE 50 MCG: 50 INJECTION, SOLUTION INTRAMUSCULAR; INTRAVENOUS at 15:50

## 2021-10-28 RX ADMIN — DEXAMETHASONE SODIUM PHOSPHATE 4 MG: 4 INJECTION, SOLUTION INTRA-ARTICULAR; INTRALESIONAL; INTRAMUSCULAR; INTRAVENOUS; SOFT TISSUE at 15:47

## 2021-10-28 RX ADMIN — SODIUM CHLORIDE, POTASSIUM CHLORIDE, SODIUM LACTATE AND CALCIUM CHLORIDE 100 ML/HR: 600; 310; 30; 20 INJECTION, SOLUTION INTRAVENOUS at 22:33

## 2021-10-28 NOTE — ANESTHESIA PREPROCEDURE EVALUATION
Relevant Problems   No relevant active problems       Anesthetic History               Review of Systems / Medical History  Patient summary reviewed, nursing notes reviewed and pertinent labs reviewed    Pulmonary                   Neuro/Psych             Comments: Hearing loss. Tinnitis Cardiovascular    Hypertension          Hyperlipidemia         GI/Hepatic/Renal     GERD          Comments: s/p Colostomy bag.   Endo/Other    Diabetes    Obesity    Comments: HIV Other Findings   Comments: Parastomal hernia           Physical Exam    Airway  Mallampati: I  TM Distance: 4 - 6 cm  Neck ROM: normal range of motion   Mouth opening: Normal     Cardiovascular    Rhythm: regular  Rate: normal         Dental      Comments: Dentures    Pulmonary  Breath sounds clear to auscultation               Abdominal  GI exam deferred       Other Findings            Anesthetic Plan    ASA: 3  Anesthesia type: general          Induction: RSI  Anesthetic plan and risks discussed with: Patient

## 2021-10-29 LAB
ANION GAP SERPL CALC-SCNC: 7 MMOL/L (ref 5–15)
APPEARANCE UR: CLEAR
BACTERIA URNS QL MICRO: NEGATIVE /HPF
BASOPHILS # BLD: 0 K/UL (ref 0–0.1)
BASOPHILS NFR BLD: 0 % (ref 0–1)
BILIRUB UR QL: NEGATIVE
BUN SERPL-MCNC: 14 MG/DL (ref 6–20)
BUN/CREAT SERPL: 20 (ref 12–20)
CA-I BLD-MCNC: 9 MG/DL (ref 8.5–10.1)
CHLORIDE SERPL-SCNC: 108 MMOL/L (ref 97–108)
CO2 SERPL-SCNC: 26 MMOL/L (ref 21–32)
COLOR UR: ABNORMAL
CREAT SERPL-MCNC: 0.7 MG/DL (ref 0.55–1.02)
DIFFERENTIAL METHOD BLD: ABNORMAL
EOSINOPHIL # BLD: 0 K/UL (ref 0–0.4)
EOSINOPHIL NFR BLD: 0 % (ref 0–7)
ERYTHROCYTE [DISTWIDTH] IN BLOOD BY AUTOMATED COUNT: 14.2 % (ref 11.5–14.5)
GLUCOSE BLD STRIP.AUTO-MCNC: 135 MG/DL (ref 65–117)
GLUCOSE BLD STRIP.AUTO-MCNC: 136 MG/DL (ref 65–117)
GLUCOSE BLD STRIP.AUTO-MCNC: 153 MG/DL (ref 65–117)
GLUCOSE BLD STRIP.AUTO-MCNC: 155 MG/DL (ref 65–117)
GLUCOSE SERPL-MCNC: 169 MG/DL (ref 65–100)
GLUCOSE UR STRIP.AUTO-MCNC: NEGATIVE MG/DL
HCT VFR BLD AUTO: 34.4 % (ref 35–47)
HGB BLD-MCNC: 11.1 G/DL (ref 11.5–16)
HGB UR QL STRIP: NEGATIVE
IMM GRANULOCYTES # BLD AUTO: 0 K/UL
IMM GRANULOCYTES NFR BLD AUTO: 0 %
KETONES UR QL STRIP.AUTO: 5 MG/DL
LEUKOCYTE ESTERASE UR QL STRIP.AUTO: NEGATIVE
LYMPHOCYTES # BLD: 0.4 K/UL (ref 0.8–3.5)
LYMPHOCYTES NFR BLD: 4 % (ref 12–49)
MCH RBC QN AUTO: 27.7 PG (ref 26–34)
MCHC RBC AUTO-ENTMCNC: 32.3 G/DL (ref 30–36.5)
MCV RBC AUTO: 85.8 FL (ref 80–99)
MONOCYTES # BLD: 0.6 K/UL (ref 0–1)
MONOCYTES NFR BLD: 6 % (ref 5–13)
MUCOUS THREADS URNS QL MICRO: ABNORMAL /LPF
NEUTS SEG # BLD: 8.3 K/UL (ref 1.8–8)
NEUTS SEG NFR BLD: 90 % (ref 32–75)
NITRITE UR QL STRIP.AUTO: NEGATIVE
NRBC # BLD: 0 K/UL (ref 0–0.01)
NRBC BLD-RTO: 0 PER 100 WBC
PERFORMED BY, TECHID: ABNORMAL
PH UR STRIP: 7 [PH] (ref 5–8)
PLATELET # BLD AUTO: 187 K/UL (ref 150–400)
PMV BLD AUTO: 10 FL (ref 8.9–12.9)
POTASSIUM SERPL-SCNC: 3.4 MMOL/L (ref 3.5–5.1)
PROT UR STRIP-MCNC: NEGATIVE MG/DL
RBC # BLD AUTO: 4.01 M/UL (ref 3.8–5.2)
RBC #/AREA URNS HPF: ABNORMAL /HPF (ref 0–5)
RBC MORPH BLD: ABNORMAL
SODIUM SERPL-SCNC: 141 MMOL/L (ref 136–145)
SP GR UR REFRACTOMETRY: 1.01 (ref 1–1.03)
UROBILINOGEN UR QL STRIP.AUTO: 0.1 EU/DL (ref 0.1–1)
WBC # BLD AUTO: 9.3 K/UL (ref 3.6–11)
WBC URNS QL MICRO: ABNORMAL /HPF (ref 0–4)

## 2021-10-29 PROCEDURE — 80048 BASIC METABOLIC PNL TOTAL CA: CPT

## 2021-10-29 PROCEDURE — 82962 GLUCOSE BLOOD TEST: CPT

## 2021-10-29 PROCEDURE — 85025 COMPLETE CBC W/AUTO DIFF WBC: CPT

## 2021-10-29 PROCEDURE — 36415 COLL VENOUS BLD VENIPUNCTURE: CPT

## 2021-10-29 PROCEDURE — 74011250637 HC RX REV CODE- 250/637: Performed by: ANESTHESIOLOGY

## 2021-10-29 PROCEDURE — 74011250636 HC RX REV CODE- 250/636: Performed by: SURGERY

## 2021-10-29 PROCEDURE — 74011000258 HC RX REV CODE- 258: Performed by: SURGERY

## 2021-10-29 PROCEDURE — 65270000029 HC RM PRIVATE

## 2021-10-29 RX ADMIN — CEFOXITIN SODIUM 2 G: 2 POWDER, FOR SOLUTION INTRAVENOUS at 09:37

## 2021-10-29 RX ADMIN — Medication 10 ML: at 05:23

## 2021-10-29 RX ADMIN — SODIUM CHLORIDE 250 ML: 9 INJECTION, SOLUTION INTRAVENOUS at 19:29

## 2021-10-29 RX ADMIN — PIPERACILLIN AND TAZOBACTAM 3.38 G: 3; .375 INJECTION, POWDER, LYOPHILIZED, FOR SOLUTION INTRAVENOUS at 22:42

## 2021-10-29 RX ADMIN — HYDROCODONE BITARTRATE AND ACETAMINOPHEN 1 TABLET: 5; 325 TABLET ORAL at 22:48

## 2021-10-29 RX ADMIN — PIPERACILLIN AND TAZOBACTAM 3.38 G: 3; .375 INJECTION, POWDER, LYOPHILIZED, FOR SOLUTION INTRAVENOUS at 15:18

## 2021-10-29 RX ADMIN — SODIUM CHLORIDE, POTASSIUM CHLORIDE, SODIUM LACTATE AND CALCIUM CHLORIDE 100 ML/HR: 600; 310; 30; 20 INJECTION, SOLUTION INTRAVENOUS at 22:47

## 2021-10-29 RX ADMIN — PIPERACILLIN AND TAZOBACTAM 3.38 G: 3; .375 INJECTION, POWDER, LYOPHILIZED, FOR SOLUTION INTRAVENOUS at 05:17

## 2021-10-29 RX ADMIN — Medication 10 ML: at 15:21

## 2021-10-29 RX ADMIN — HYDROCODONE BITARTRATE AND ACETAMINOPHEN 1 TABLET: 5; 325 TABLET ORAL at 02:26

## 2021-10-29 NOTE — PROGRESS NOTES
Problem: Falls - Risk of  Goal: *Absence of Falls  Description: Document [place] Fall Risk and appropriate interventions in the flowsheet.   Outcome: Progressing Towards Goal  Note: Fall Risk Interventions:            Medication Interventions: Bed/chair exit alarm, Patient to call before getting OOB, Teach patient to arise slowly    Elimination Interventions: Bed/chair exit alarm, Call light in reach, Patient to call for help with toileting needs

## 2021-10-29 NOTE — PROGRESS NOTES
POD # 1    Chief Complaint:      Subjective:  Ms. Josiah Lloyd is a 80y.o. year old * female S/P Exploratory  Laparotomy, extensive lysis of adhesion reciting of colostomy to right lower quadrant with repair of left parastomal hernia and midline incisional hernia and partial resection of left colon. No nausea vomiting. No fever or chills. Review of Systems:   Constitutional:  no fever,  no chills,  no sweats, No weakness, No fatigue, No decreased activity. Respiratory: No shortness of breath, No cough, No sputum production, No hemoptysis, No wheezing, No cyanosis. Cardiovascular: No chest pain, No palpitations, No bradycardia, No tachycardia, No peripheral edema, No syncope. Gastrointestinal: No nausea,  No vomiting, No diarrhea, No constipation, No heartburn, No abdominal pain. Genitourinary: No dysuria, No hematuria, No change in urine stream, No urethral discharge, No lesions. Musculoskeletal: No back pain, No neck pain, No joint pain, No muscle pain, No claudication, No decreased range of motion, No trauma. Integumentary: No rash, No pruritus, No abrasions. Neurologic: Alert and oriented X4, No abnormal balance, No headache, No confusion, No numbness, No tingling. Psychiatric: No anxiety, No depression, No rosales. Physical Exam:     Vitals & Measurements:     Wt Readings from Last 3 Encounters:   10/28/21 89.6 kg (197 lb 8.5 oz)   10/26/21 89.6 kg (197 lb 8.5 oz)   07/06/21 87.5 kg (193 lb)     Temp Readings from Last 3 Encounters:   10/29/21 99.3 °F (37.4 °C)   10/26/21 98.1 °F (36.7 °C)   07/06/21 97.7 °F (36.5 °C) (Temporal)     BP Readings from Last 3 Encounters:   10/29/21 (!) 99/45   10/26/21 (!) 158/71   07/06/21 126/80     Pulse Readings from Last 3 Encounters:   10/29/21 78   10/26/21 67   07/06/21 63      Ht Readings from Last 3 Encounters:   10/28/21 5' 5.35\" (1.66 m)   10/26/21 5' 5.35\" (1.66 m)   07/06/21 5' 7\" (1.702 m)      Date 10/28/21 0700 - 10/29/21 0659 10/29/21 0700 - 10/30/21 0659   Shift 8420-5686 3786-1549 24 Hour Total 0511-3688 6308-6003 24 Hour Total   INTAKE   I.V.(mL/kg/hr) 1900(1.8) 536(0.5) 2436(1.1)        Volume (lactated Ringers infusion)  236 236        Volume (lactated Ringers infusion) 9020 724 8293        Volume (cefOXitin (MEFOXIN) 2 g in 0.9% sodium chloride (MBP/ADV) 50 mL MBP) 50  50        Volume (albumin human 5% (BUMINATE) solution) 250  250        Volume (piperacillin-tazobactam (ZOSYN) 3.375 g in 0.9% sodium chloride (MBP/ADV) 100 mL MBP)  100 100      Shift Total(mL/kg) 1900(21.2) 536(6) 1182(84.4)      OUTPUT   Urine(mL/kg/hr) 250(0.2) 300(0.3) 550(0.3)        Urine Output 250  250        Urine Output (mL) (Urinary Catheter 10/28/21 2- way; Reynolds)  300 300      Blood  50 50        Estimated Blood Loss  50 50      Shift Total(mL/kg) 250(2.8) 350(3.9) 600(6.7)      NET 9503 511 4421      Weight (kg) 89.6 89.6 89.6 89.6 89.6 89.6       General: well appearing, no acute distress  Respiratory: normal chest wall expansion, CTA B, no r/r/w, no rubs  Cardiovascular: RRR, no m/r/g, Normal S1 and S2  Abdomen: Soft, non tender, non-distended, normal bowel sounds in all quadrants, no hepatosplenomegaly, no tympany. Incision scar: Skin intact. Stoma viable.  Minimal gas in the bag  Genitourinary: No inguinal hernia, normal external gentalia, no renal angle tenderness  Musculoskeletal: normal ROM in upper and lower extremities  Integumentary: warm, dry, and pink, with no rash, purpura, or petechia  Neurological:Cranial Nerves II-XII grossly intact, No sensory or motor deficit  Psychiatric: Cooperative with normal mood, affect, and cognition    Laboratory Values:   Recent Results (from the past 24 hour(s))   GLUCOSE, POC    Collection Time: 10/28/21 12:58 PM   Result Value Ref Range    Glucose (POC) 114 65 - 117 mg/dL    Performed by Jez Marina W/MICROSCOPIC    Collection Time: 10/28/21  5:30 PM   Result Value Ref Range    Color Yellow/Straw Appearance Clear Clear      Specific gravity 1.010 1.003 - 1.030      pH (UA) 7.0 5.0 - 8.0      Protein Negative Negative mg/dL    Glucose Negative Negative mg/dL    Ketone 5 (A) Negative mg/dL    Bilirubin Negative Negative      Blood Negative Negative      Urobilinogen 0.1 0.1 - 1.0 EU/dL    Nitrites Negative Negative      Leukocyte Esterase Negative Negative      WBC 0-4 0 - 4 /hpf    RBC 0-5 0 - 5 /hpf    Bacteria Negative Negative /hpf    Mucus Trace /lpf   GLUCOSE, POC    Collection Time: 10/29/21  8:31 AM   Result Value Ref Range    Glucose (POC) 153 (H) 65 - 117 mg/dL    Performed by Tg Balderrama    CBC WITH AUTOMATED DIFF    Collection Time: 10/29/21  9:10 AM   Result Value Ref Range    WBC 9.3 3.6 - 11.0 K/uL    RBC 4.01 3.80 - 5.20 M/uL    HGB 11.1 (L) 11.5 - 16.0 g/dL    HCT 34.4 (L) 35.0 - 47.0 %    MCV 85.8 80.0 - 99.0 FL    MCH 27.7 26.0 - 34.0 PG    MCHC 32.3 30.0 - 36.5 g/dL    RDW 14.2 11.5 - 14.5 %    PLATELET 982 885 - 144 K/uL    MPV 10.0 8.9 - 12.9 FL    NRBC 0.0 0.0  WBC    ABSOLUTE NRBC 0.00 0.00 - 0.01 K/uL    NEUTROPHILS PENDING %    LYMPHOCYTES PENDING %    MONOCYTES PENDING %    EOSINOPHILS PENDING %    BASOPHILS PENDING %    IMMATURE GRANULOCYTES PENDING %    ABS. NEUTROPHILS PENDING K/UL    ABS. LYMPHOCYTES PENDING K/UL    ABS. MONOCYTES PENDING K/UL    ABS. EOSINOPHILS PENDING K/UL    ABS. BASOPHILS PENDING K/UL    ABS. IMM. GRANS.  PENDING K/UL    DF PENDING    METABOLIC PANEL, BASIC    Collection Time: 10/29/21  9:10 AM   Result Value Ref Range    Sodium 141 136 - 145 mmol/L    Potassium 3.4 (L) 3.5 - 5.1 mmol/L    Chloride 108 97 - 108 mmol/L    CO2 26 21 - 32 mmol/L    Anion gap 7 5 - 15 mmol/L    Glucose 169 (H) 65 - 100 mg/dL    BUN 14 6 - 20 mg/dL    Creatinine 0.70 0.55 - 1.02 mg/dL    BUN/Creatinine ratio 20 12 - 20      GFR est AA >60 >60 ml/min/1.73m2    GFR est non-AA >60 >60 ml/min/1.73m2    Calcium 9.0 8.5 - 10.1 mg/dL   GLUCOSE, POC    Collection Time: 10/29/21 12:14 PM   Result Value Ref Range    Glucose (POC) 155 (H) 65 - 117 mg/dL    Performed by Marylene Goods          Radiology:   No orders to display         Assessment:  Problem List Items Addressed This Visit     None           Plan:    1. Admission  2. Diet: Sips of clears. 3. IV fluids  4. SCD  5. IS  6. Pain medications  7. Antibiotics  8. Nausea medication  9. DC Reynolds  10. To chair and ambulate. 11. Labs & Radiology: In the a.m.  12. Plan discussed with patient and family and answered all their questions. Thank you for allowing me to participate in the care of this patient.

## 2021-10-29 NOTE — PROGRESS NOTES
Received aptient from PACU per bed accompanied by PACU NOD. Patient lying on bed, not in distress, no complaints of pain. Vital signs taken and recorded.

## 2021-10-29 NOTE — OP NOTES
OPERATIVE NOTE    Patient: Bozena Mcgregor  YOB: 1940  MRN: [id number]    Date of Procedure: 10/28/2021     Pre-Op Diagnosis: INCARCERATED PARASTOMAL HERNIA & midline iNCISIONAL HERNIA    Post-Op Diagnosis: Same as preoperative diagnosis. Procedure(s):  EXPLORTORY LAPAROTOMY WITH EXTENSIVE LYSIS OF ADHESION (COMPLEX), PARTIAL LEFT COLECTOMY, RESITING OF STOMA TO RIGHT LOWER QUADRANT. REPAIR OF INCARCERATED PARASTOMAL HERNIA,     Surgeon(s):  Florentin Summers MD    Surgical Assistant: Ms. Ann Marie Vegas    Anesthesia: General/LOCAL    ANESTHESIOLOGIST: DR. CRUZ    CRNA: Ms. India Florez:  Status post abdominal perineal resection. She developed a large parastomal hernia containing descending and transverse colon. She also had some discomfort and pain. Hence she has been scheduled for an elective procedure of repairing this parastomal hernia and incisional hernia with possible Exploratory laparotomy. Risk-benefit complications were discussed and consent obtained. PROCEDURE:    Patient was taken to the operative room. Patient was laid supine. Patient received prophylactic dose of antibiotic. Patient had teds and SCD applied to both lower extremity. The left lower quadrant stoma was closed with 3-0 nylon stitches. After Reynolds catheter was placed the abdomen was prepped and draped usual sterile fashion. Timeout was completed. Local anesthesia was infiltrated. A midline incision was placed through the old incision scar. Incision deepened through subcutaneous tissue and linea alba. There was a midline incisional hernia with sac. Sac was dissected. Peritoneal cavity was entered without any complication. There was a lot of adhesions that had to be lysed. The left parastomal hernia contained part of the left colon. The left parastomal hernia sac was dissected the left colon was delivered out.   Lysis of adhesion and removal of the sac from the left parastomal site took more than an hour of operating time. The size of the opening in the left parastomal site was large and the muscle fibers and the fascia were thinned out. At this time point I realized that even placing a mesh would not hold properly and the recurrence rate might be very high. So I decided to proceed with resiting the stoma to right lower quadrant. I made an elliptical incision externally around the stoma site. Grasped with either the edges of the skin with the stoma with Allis clamp. I worked around dissected and there colostomy. This was a fairly redundant colon. I went and used a DIVYA to divide a portion of the descending colon and stoma  and submitted as a specimen. I decided to bring the remaining portion of the descending colon to the right lower quadrant site. I created a mirror image incision on the right side and went through the fascia in a cruciate fashion and split the muscle fibers as I delivered the left colon onto the right side opening. There was no tension. I held the colon with the [facility]. Even on this side the fascia seem to be very thinned out. On the left side the internal oblique and transversus abdominis trans-Allis fascia were all close together using #1 Prolene stitch in the form of simple interrupted stitches. Then I closed the external oblique aponeurosis on the left side from outside using #1 Prolene stitches in the form of simple interrupted vertical mattress stitches. The left side wound was then closed in layers. The subcutaneous layers were approximated using 3-0 Vicryl simple stitches. And skin staples applied. Midline fascia was closed from above and below using #01 loop PDS and tied off in the midline. Subtenons tissue was irrigated and closed using 3-0 Vicryl simple stitches. Skin staples were applied. The left lower quadrant incision site and the midline incision sites were covered with a blue towel.     The new right lower quadrant stoma was matured using 2-0 Vicryl simple type stitches. And a colostomy bag was applied. Patient tolerated procedure very well. There were no intraoperative complication. Counts were all correct. Patient was extubated and transferred to recovery in stable condition.     Estimated Blood Loss (mL): Minimal    Complications: None    Specimens:   ID Type Source Tests Collected by Time Destination   1 : Parastoma hernia sac Preservative Hernia Sac  Sanjay Ponce MD 10/28/2021 1901 Pathology   2 : LEFT COLON Preservative Colon, Left  Josey MD Nadiya 10/28/2021 2055 Pathology   1 : urine Urine Reynolds Specimen CULTURE, URINE Sanjay Ponce MD 10/28/2021 1917 Microbiology        Implants: NONE    Drains: nONE    Findings: AS ABOVE    Electronically Signed by Caden Molina MD on 10/28/2021 at 9:49 PM

## 2021-10-29 NOTE — BRIEF OP NOTE
Brief Postoperative Note    Patient: Miranda Thomas  YOB: 1940  MRN: [id number]    Date of Procedure: 10/28/2021     Pre-Op Diagnosis: INCARCERATED PARASTOMAL HERNIA & midline iNCISIONAL HERNIA    Post-Op Diagnosis: Same as preoperative diagnosis. Procedure(s):  EXPLORTORY LAPAROTOMY WITH EXTENSIVE LYSIS OF ADHESION (COMPLEX), PARTIAL LEFT COLECTOMY, RESITING OF STOMA TO RIGHT LOWER QUADRANT. REPAIR OF INCARCERATED PARASTOMA HERNIA,     Surgeon(s):  Sheree Goodwin MD    Surgical Assistant: None    Anesthesia: General/LOCAL    ANESTHESIOLOGIST: DR. CRUZ    CRNA: Ms. Viki Siu:  Status post abdominal perineal resection. He developed a large parastomal hernia containing descending and transverse colon. He also she also had some discomfort. Hence she has been scheduled for an elective procedure of repairing this parastomal hernia with possible Exploratory laparotomy. Risk-benefit complications were discussed and consent obtained. PROCEDURE:    Patient was taken to the operative room. Patient was laid supine. Patient received prophylactic dose of antibiotic. Patient had teds and SCD applied to both lower extremity. The left lower quadrant stoma was closed with 3-0 nylon stitches. After Reynolds catheter was placed the abdomen was prepped and draped usual sterile fashion. Timeout was completed. Local anesthesia was infiltrated. A midline incision was placed through the old incision scar. Incision deepened to space tissue and linea alba. There was a midline incisional hernia containing empty sac. Sac was dissected. And the midline incision incision was extended deeper through the subcutaneous tissue and fascia into the peritoneal cavity. Identified going into the left lower quadrant Port-A-Cath site. We to start taking on all the adhesions. Pain was controlled.   In spite of this patient continued to have pain nausea vomiting symptoms consistent with acute cholecystitis. On clinical examination she continued to have some jaundice.     Estimated Blood Loss (mL): Minimal    Complications: None    Specimens:   ID Type Source Tests Collected by Time Destination   1 : Parastoma hernia sac Preservative Hernia Sac  Jabari Ponce MD 10/28/2021 1901 Pathology   2 : LEFT COLON Preservative Colon, Left  Sophia Ann MD 10/28/2021 2055 Pathology   1 : urine Urine Reynolds Specimen CULTURE, URINE Jabari Ponce MD 10/28/2021 1917 Microbiology        Implants: NONE    Drains: nONE    Findings: AS ABOVE    Electronically Signed by Larisa Hector MD on 10/28/2021 at 9:49 PM

## 2021-10-29 NOTE — PROGRESS NOTES
Reason for Admission:  Obstruction due to parastomal hernia, S/P exploratory laparotomy                     RUR Score:     7%                Plan for utilizing home health:  none        PCP: First and Last name:  Carlyle Almanza MD     Name of Practice:    Are you a current patient: Yes/No: yes   Approximate date of last visit: September 2021   Can you participate in a virtual visit with your PCP: possibly                    Current Advanced Directive/Advance Care Plan: No Order      Healthcare Decision Maker:     Daughter Mukund Bravo [phone]    Click here to complete [address] including selection of the Healthcare Decision Maker Relationship (ie \"Primary\")                             Transition of Care Plan:       Met with patient at bedside. Lives in 1 story [facility] home with her daughter. Does not use any DMEs and attends to her own ADLs. 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 Has used home health in past when she had Cancer but does not remember company. DC plan is home self care.

## 2021-10-30 ENCOUNTER — APPOINTMENT (OUTPATIENT)
Dept: GENERAL RADIOLOGY | Age: 81
DRG: 329 | End: 2021-10-30
Attending: SURGERY
Payer: MEDICARE

## 2021-10-30 LAB
ANION GAP SERPL CALC-SCNC: 9 MMOL/L (ref 5–15)
BACTERIA SPEC CULT: NORMAL
BASOPHILS # BLD: 0 K/UL (ref 0–0.1)
BASOPHILS NFR BLD: 0 % (ref 0–1)
BUN SERPL-MCNC: 11 MG/DL (ref 6–20)
BUN/CREAT SERPL: 21 (ref 12–20)
CA-I BLD-MCNC: 9.1 MG/DL (ref 8.5–10.1)
CHLORIDE SERPL-SCNC: 110 MMOL/L (ref 97–108)
CO2 SERPL-SCNC: 24 MMOL/L (ref 21–32)
CREAT SERPL-MCNC: 0.53 MG/DL (ref 0.55–1.02)
DIFFERENTIAL METHOD BLD: ABNORMAL
EOSINOPHIL # BLD: 0 K/UL (ref 0–0.4)
EOSINOPHIL NFR BLD: 0 % (ref 0–7)
ERYTHROCYTE [DISTWIDTH] IN BLOOD BY AUTOMATED COUNT: 14.5 % (ref 11.5–14.5)
GLUCOSE BLD STRIP.AUTO-MCNC: 146 MG/DL (ref 65–117)
GLUCOSE SERPL-MCNC: 119 MG/DL (ref 65–100)
HCT VFR BLD AUTO: 32.7 % (ref 35–47)
HGB BLD-MCNC: 10.5 G/DL (ref 11.5–16)
IMM GRANULOCYTES # BLD AUTO: 0 K/UL
IMM GRANULOCYTES NFR BLD AUTO: 0 %
LYMPHOCYTES # BLD: 0.8 K/UL (ref 0.8–3.5)
LYMPHOCYTES NFR BLD: 7 % (ref 12–49)
MCH RBC QN AUTO: 27.7 PG (ref 26–34)
MCHC RBC AUTO-ENTMCNC: 32.1 G/DL (ref 30–36.5)
MCV RBC AUTO: 86.3 FL (ref 80–99)
METAMYELOCYTES NFR BLD MANUAL: 1 %
MONOCYTES # BLD: 0.1 K/UL (ref 0–1)
MONOCYTES NFR BLD: 1 % (ref 5–13)
NEUTS SEG # BLD: 10.9 K/UL (ref 1.8–8)
NEUTS SEG NFR BLD: 90 % (ref 32–75)
NRBC # BLD: 0 K/UL (ref 0–0.01)
NRBC BLD-RTO: 0 PER 100 WBC
PERFORMED BY, TECHID: ABNORMAL
PLATELET # BLD AUTO: 165 K/UL (ref 150–400)
PMV BLD AUTO: 10.4 FL (ref 8.9–12.9)
POTASSIUM SERPL-SCNC: 3.4 MMOL/L (ref 3.5–5.1)
PROMYELOCYTES NFR BLD MANUAL: 1 %
RBC # BLD AUTO: 3.79 M/UL (ref 3.8–5.2)
RBC MORPH BLD: ABNORMAL
SODIUM SERPL-SCNC: 143 MMOL/L (ref 136–145)
SPECIAL REQUESTS,SREQ: NORMAL
WBC # BLD AUTO: 12.1 K/UL (ref 3.6–11)

## 2021-10-30 PROCEDURE — 74011000258 HC RX REV CODE- 258: Performed by: SURGERY

## 2021-10-30 PROCEDURE — 82962 GLUCOSE BLOOD TEST: CPT

## 2021-10-30 PROCEDURE — 36415 COLL VENOUS BLD VENIPUNCTURE: CPT

## 2021-10-30 PROCEDURE — 74011000250 HC RX REV CODE- 250: Performed by: INTERNAL MEDICINE

## 2021-10-30 PROCEDURE — 85025 COMPLETE CBC W/AUTO DIFF WBC: CPT

## 2021-10-30 PROCEDURE — 74011250637 HC RX REV CODE- 250/637: Performed by: SURGERY

## 2021-10-30 PROCEDURE — 65270000029 HC RM PRIVATE

## 2021-10-30 PROCEDURE — 80048 BASIC METABOLIC PNL TOTAL CA: CPT

## 2021-10-30 PROCEDURE — 93005 ELECTROCARDIOGRAM TRACING: CPT

## 2021-10-30 PROCEDURE — 74011000258 HC RX REV CODE- 258: Performed by: INTERNAL MEDICINE

## 2021-10-30 PROCEDURE — 74011250636 HC RX REV CODE- 250/636: Performed by: SURGERY

## 2021-10-30 PROCEDURE — 74018 RADEX ABDOMEN 1 VIEW: CPT

## 2021-10-30 PROCEDURE — 74011250637 HC RX REV CODE- 250/637: Performed by: ANESTHESIOLOGY

## 2021-10-30 RX ORDER — DILTIAZEM HCL/D5W 125 MG/125
5 PLASTIC BAG, INJECTION (ML) INTRAVENOUS
Status: DISCONTINUED | OUTPATIENT
Start: 2021-10-30 | End: 2021-10-30

## 2021-10-30 RX ORDER — ATENOLOL 50 MG/1
50 TABLET ORAL DAILY
Status: DISCONTINUED | OUTPATIENT
Start: 2021-10-30 | End: 2021-11-01

## 2021-10-30 RX ORDER — POTASSIUM CHLORIDE 1.5 G/1.77G
40 POWDER, FOR SOLUTION ORAL
Status: COMPLETED | OUTPATIENT
Start: 2021-10-30 | End: 2021-10-30

## 2021-10-30 RX ORDER — DILTIAZEM HCL/D5W 125 MG/125
5 PLASTIC BAG, INJECTION (ML) INTRAVENOUS CONTINUOUS
Status: DISCONTINUED | OUTPATIENT
Start: 2021-10-30 | End: 2021-10-30

## 2021-10-30 RX ORDER — POTASSIUM CHLORIDE 7.45 MG/ML
10 INJECTION INTRAVENOUS ONCE
Status: COMPLETED | OUTPATIENT
Start: 2021-10-30 | End: 2021-10-30

## 2021-10-30 RX ORDER — MORPHINE SULFATE 2 MG/ML
2 INJECTION, SOLUTION INTRAMUSCULAR; INTRAVENOUS
Status: ACTIVE | OUTPATIENT
Start: 2021-10-30 | End: 2021-11-01

## 2021-10-30 RX ADMIN — PIPERACILLIN AND TAZOBACTAM 3.38 G: 3; .375 INJECTION, POWDER, LYOPHILIZED, FOR SOLUTION INTRAVENOUS at 06:04

## 2021-10-30 RX ADMIN — Medication 10 ML: at 14:00

## 2021-10-30 RX ADMIN — PIPERACILLIN AND TAZOBACTAM 3.38 G: 3; .375 INJECTION, POWDER, LYOPHILIZED, FOR SOLUTION INTRAVENOUS at 15:22

## 2021-10-30 RX ADMIN — POTASSIUM CHLORIDE 10 MEQ: 7.46 INJECTION, SOLUTION INTRAVENOUS at 21:35

## 2021-10-30 RX ADMIN — HYDROCODONE BITARTRATE AND ACETAMINOPHEN 1 TABLET: 5; 325 TABLET ORAL at 13:14

## 2021-10-30 RX ADMIN — POTASSIUM CHLORIDE 40 MEQ: 1.5 FOR SOLUTION ORAL at 11:18

## 2021-10-30 RX ADMIN — SODIUM CHLORIDE 250 ML: 9 INJECTION, SOLUTION INTRAVENOUS at 13:47

## 2021-10-30 RX ADMIN — SODIUM CHLORIDE, POTASSIUM CHLORIDE, SODIUM LACTATE AND CALCIUM CHLORIDE 125 ML/HR: 600; 310; 30; 20 INJECTION, SOLUTION INTRAVENOUS at 19:17

## 2021-10-30 RX ADMIN — ATENOLOL 50 MG: 25 TABLET ORAL at 09:35

## 2021-10-30 RX ADMIN — PIPERACILLIN AND TAZOBACTAM 3.38 G: 3; .375 INJECTION, POWDER, LYOPHILIZED, FOR SOLUTION INTRAVENOUS at 21:37

## 2021-10-30 RX ADMIN — SODIUM CHLORIDE 250 ML: 9 INJECTION, SOLUTION INTRAVENOUS at 11:18

## 2021-10-30 RX ADMIN — DILTIAZEM HYDROCHLORIDE: 5 INJECTION INTRAVENOUS at 19:01

## 2021-10-30 NOTE — PROGRESS NOTES
Patient transferred from [address] [place] [address] to be placed on cardizem drip.  Report received from Marilyn Chan

## 2021-10-30 NOTE — PROGRESS NOTES
Patient's heart rate rechecked and it was ranging in the 115s-140s. Dr. Nisha Padilla notified. Still waiting to get a telemetry monitor for patient.  Was notified by supervisor that there was none available in the hospital.

## 2021-10-30 NOTE — PROGRESS NOTES
Called Dr. Cookie Bagley and made him aware that the patient's HR was 145 on the monitor and 115 manually when taking morning vitals. He ordered a STAT EKG, CBC, BMP, KUB, started her on telemetry monitoring, and atenolol 50mg daily. He stated he would come see her soon. Will continue to monitor her closely.

## 2021-10-30 NOTE — PROGRESS NOTES
POD # 2    Chief Complaint:      Subjective:  Ms. Sara Lopez is a 80y.o. year old * female S/P Exploratory  Laparotomy, extensive lysis of adhesion resiting of colostomy to right lower quadrant with repair of left parastomal hernia and midline incisional hernia and partial resection of left colon. No nausea vomiting. Has been Tachycardic since am. No palpitation, nausea or vomiting, fever or chills. Seen by Cardiology and recommended transfer to Telemetry with IV Cardizem. Review of Systems:   Constitutional:  no fever,  no chills,  no sweats, No weakness, No fatigue, No decreased activity. Respiratory: No shortness of breath, No cough, No sputum production, No hemoptysis, No wheezing, No cyanosis. Cardiovascular: No chest pain, No palpitations, No bradycardia, No tachycardia, No peripheral edema, No syncope. Gastrointestinal: No nausea,  No vomiting, No diarrhea, No constipation, No heartburn, No abdominal pain. Genitourinary: No dysuria, No hematuria, No change in urine stream, No urethral discharge, No lesions. Musculoskeletal: No back pain, No neck pain, No joint pain, No muscle pain, No claudication, No decreased range of motion, No trauma. Integumentary: No rash, No pruritus, No abrasions. Neurologic: Alert and oriented X4, No abnormal balance, No headache, No confusion, No numbness, No tingling. Psychiatric: No anxiety, No depression, No rosales. Physical Exam:     Vitals & Measurements:     Wt Readings from Last 3 Encounters:   10/28/21 89.6 kg (197 lb 8.5 oz)   10/26/21 89.6 kg (197 lb 8.5 oz)   07/06/21 87.5 kg (193 lb)     Temp Readings from Last 3 Encounters:   10/30/21 99 °F (37.2 °C)   10/26/21 98.1 °F (36.7 °C)   07/06/21 97.7 °F (36.5 °C) (Temporal)     BP Readings from Last 3 Encounters:   10/30/21 108/62   10/26/21 (!) 158/71   07/06/21 126/80     Pulse Readings from Last 3 Encounters:   10/30/21 (!) 137   10/26/21 67   07/06/21 63      Ht Readings from Last 3 Encounters: 10/28/21 5' 5.35\" (1.66 m)   10/26/21 5' 5.35\" (1.66 m)   07/06/21 5' 7\" (1.702 m)      Date 10/29/21 0700 - 10/30/21 0659 10/30/21 0700 - 10/31/21 0659   Shift 2865-1974 9783-5449 24 Hour Total 1326-0584 0026-3659 24 Hour Total   INTAKE   P.O.  60 60        P. O.  60 60      I. V.(mL/kg/hr)  926(0.9) 926(0.4)        Volume (lactated Ringers infusion)  726 726        Volume (piperacillin-tazobactam (ZOSYN) 3.375 g in 0.9% sodium chloride (MBP/ADV) 100 mL MBP)  200 200      Shift Total(mL/kg)  986(11) 986(11)      OUTPUT   Urine(mL/kg/hr) 400(0.4)  400(0.2)        Urine Occurrence(s)  2 x 2 x        Urine Output (mL) ([REMOVED] Urinary Catheter 10/28/21 2- way; Reynolds) 400  400      Shift Total(mL/kg) 400(4.5)  400(4.5)      NET -400 986 586      Weight (kg) 89.6 89.6 89.6 89.6 89.6 89.6       General: well appearing, no acute distress, looks very comfortable. Respiratory: normal chest wall expansion, CTA B, no r/r/w, no rubs  Cardiovascular: RRR, no m/r/g, Normal S1 and S2  Abdomen: Soft, non tender, non-distended, normal bowel sounds in all quadrants, no hepatosplenomegaly, no tympany. Incision scar: Skin intact. Stoma viable. Minimal gas in the bag.   Genitourinary: No inguinal hernia, normal external gentalia, no renal angle tenderness  Musculoskeletal: normal ROM in upper and lower extremities  Integumentary: warm, dry, and pink, with no rash, purpura, or petechia  Neurological:Cranial Nerves II-XII grossly intact, No sensory or motor deficit  Psychiatric: Cooperative with normal mood, affect, and cognition    Laboratory Values:   Recent Results (from the past 24 hour(s))   GLUCOSE, POC    Collection Time: 10/29/21  4:05 PM   Result Value Ref Range    Glucose (POC) 135 (H) 65 - 117 mg/dL    Performed by Fabio Eid    GLUCOSE, POC    Collection Time: 10/29/21  9:07 PM   Result Value Ref Range    Glucose (POC) 136 (H) 65 - 117 mg/dL    Performed by AIDE SALDIVAR    CBC WITH AUTOMATED DIFF    Collection Time: 10/30/21  8:57 AM   Result Value Ref Range    WBC 12.1 (H) 3.6 - 11.0 K/uL    RBC 3.79 (L) 3.80 - 5.20 M/uL    HGB 10.5 (L) 11.5 - 16.0 g/dL    HCT 32.7 (L) 35.0 - 47.0 %    MCV 86.3 80.0 - 99.0 FL    MCH 27.7 26.0 - 34.0 PG    MCHC 32.1 30.0 - 36.5 g/dL    RDW 14.5 11.5 - 14.5 %    PLATELET 518 683 - 795 K/uL    MPV 10.4 8.9 - 12.9 FL    NRBC 0.0 0.0  WBC    ABSOLUTE NRBC 0.00 0.00 - 0.01 K/uL    NEUTROPHILS 90 (H) 32 - 75 %    LYMPHOCYTES 7 (L) 12 - 49 %    MONOCYTES 1 (L) 5 - 13 %    EOSINOPHILS 0 0 - 7 %    BASOPHILS 0 0 - 1 %    METAMYELOCYTES 1 (H) 0 %    PROMYELOCYTES 1 (H) 0 %    IMMATURE GRANULOCYTES 0 %    ABS. NEUTROPHILS 10.9 (H) 1.8 - 8.0 K/UL    ABS. LYMPHOCYTES 0.8 0.8 - 3.5 K/UL    ABS. MONOCYTES 0.1 0.0 - 1.0 K/UL    ABS. EOSINOPHILS 0.0 0.0 - 0.4 K/UL    ABS. BASOPHILS 0.0 0.0 - 0.1 K/UL    ABS. IMM. GRANS. 0.0 K/UL    DF Manual      RBC COMMENTS Microcytosis  1+       METABOLIC PANEL, BASIC    Collection Time: 10/30/21  8:57 AM   Result Value Ref Range    Sodium 143 136 - 145 mmol/L    Potassium 3.4 (L) 3.5 - 5.1 mmol/L    Chloride 110 (H) 97 - 108 mmol/L    CO2 24 21 - 32 mmol/L    Anion gap 9 5 - 15 mmol/L    Glucose 119 (H) 65 - 100 mg/dL    BUN 11 6 - 20 mg/dL    Creatinine 0.53 (L) 0.55 - 1.02 mg/dL    BUN/Creatinine ratio 21 (H) 12 - 20      GFR est AA >60 >60 ml/min/1.73m2    GFR est non-AA >60 >60 ml/min/1.73m2    Calcium 9.1 8.5 - 10.1 mg/dL         Radiology:   XR ABD (KUB)   Final Result   Findings/impression:      Gaseous prominence of bowel throughout the abdomen, suggesting ileus or   low-grade obstruction. No supine evidence of pneumoperitoneum. Skin staples noted in the right lower quadrant/pelvis. Right pleural effusion noted. No acute osseous abnormality identified.                   Assessment:  Problem List Items Addressed This Visit     None       Large Incarcerated Parastomal hernia with incisional hernia  S/P Exploratory  Laparotomy, extensive lysis of adhesion resiting of colostomy to right lower quadrant with repair of left parastomal hernia and midline incisional hernia and partial resection of left colon, POD #2        Plan:    1. Admission  2. Diet: Sips of clears. 3. IV fluids  4. SCD  5. IS  6. Pain medications  7. Antibiotics: Zosyn  8. Nausea medication  9. To chair and ambulate. 10. Labs & Radiology: In the a.[facility] 22 Cardiology Consultation. 12. Plan discussed with patient and family and answered all their questions. Thank you for allowing me to participate in the care of this patient.

## 2021-10-30 NOTE — CONSULTS
Consult    NAME: Richie Baugh   :  1940   MRN:  [id number]     Date/Time:  10/30/2021 11:28 AM    Patient PCP: Luis Carlin MD  ________________________________________________________________________     Assessment:   Primary cardiologist: None    PROBLEM LIST:  1.  Status post exploratory laparotomy with extensive lysis of adhesion  2. History of colon cancer  3. Chronic pain  4. Resting sinus tachycardia  5. Hypertension  6. Dyslipidemia  7. Type 2 diabetes  8. Human immunodeficiency virus infection (HIV)  9. Tinnitus  10. Hearing impaired    11. Mild leukocytosis   12. Anemia  13. Electrolyte abnormalities (hypokalemia, and hyperchloremia)        []        High complexity decision making was performed        Subjective:   CHIEF COMPLAINT:     HISTORY OF PRESENT ILLNESS:     This 61-year-old -American female with no known coronary disease presents for abdominal surgery. She underwent uncomplicated exploratory laparotomy with lysis of adhesions. While being monitored she is noted to have rapid heart rate. Cardiology is therefore consulted. The patient denies previous myocardial infarction. There is no history of congestive heart failure. She denies any recent cardiovascular complaints such as chest pain, pressure or tightness. Further there is no shortness of breath or dyspnea on exertion. Her telemetry monitor, and twelve-lead EKG reveals atrial flutter with rapid ventricular response.         Past Medical History:   Diagnosis Date    Cancer [facility])     cancer of colon    Chronic pain     Diabetes ([facility] [address]     Hearing loss 6/15/2020    HIV (human immunodeficiency virus infection) ([facility] [address]     Hypercholesterolemia     Hypertension     Tinnitus 6/15/2020      Past Surgical History:   Procedure Laterality Date    HX COLONOSCOPY      HX GI      Colon surgery colostomy    HX GYN      hysterectomy     No Known Allergies   Meds:  See below  Social History     Tobacco Use    Smoking status: Former Smoker    Smokeless tobacco: Never Used    Tobacco comment: pt states quit more than 20 years ago   Substance Use Topics    Alcohol use: Never      Family History   Problem Relation Age of Onset    Diabetes Mother     Cancer Father        REVIEW OF SYSTEMS:    As above, otherwise noncontributory. Objective:      Physical Exam:    Last 24hrs VS reviewed since prior progress note. Most recent are:    Visit Vitals  /62   Pulse (!) 111   Temp 99 °F (37.2 °C)   Resp 18   Ht 5' 5.35\" (1.66 m)   Wt 89.6 kg (197 lb 8.5 oz)   SpO2 93%   BMI 32.52 kg/m²       Intake/Output Summary (Last 24 hours) at 10/30/2021 1128  Last data filed at 10/30/2021 0604  Gross per 24 hour   Intake 986 ml   Output 400 ml   Net 586 ml        General Appearance: Well developed, in no acute respiratory distress. Ears/Nose/Mouth/Throat: Pupils equal and round, Hearing grossly normal.  Neck: Supple. JVP within normal limits. Carotids good upstrokes, with no bruit. Chest: Lungs clear to auscultation bilaterally. Cardiovascular: JVP is not elevated, PMI is not attempted, normal intensity S1 and S2, without S3. Abdomen: Soft, non-tender, bowel sounds are active. No organomegaly. Extremities: No edema bilaterally. Skin: Warm and dry. Neuro: CN II-XII grossly intact, Strength and sensation grossly intact. Data:      Telemetry:    EKG:  []  No new EKG for review  XR ABD (KUB)   Final Result   Findings/impression:      Gaseous prominence of bowel throughout the abdomen, suggesting ileus or   low-grade obstruction. No supine evidence of pneumoperitoneum. Skin staples noted in the right lower quadrant/pelvis. Right pleural effusion noted. No acute osseous abnormality identified. Prior to Admission medications    Medication Sig Start Date End Date Taking? Authorizing Provider   acetaminophen (Tylenol Extra Strength) 500 mg tablet Take 500 mg by mouth daily.    Yes Provider, Historical   amLODIPine (NORVASC) 5 mg tablet Take 5 mg by mouth daily. 3/8/21  Yes Provider, Historical   losartan (COZAAR) 100 mg tablet Take 50 mg by mouth two (2) times a day. Yes Provider, Historical   atenoloL (TENORMIN) 50 mg tablet Take 50 mg by mouth daily. Yes Provider, Historical   atorvastatin (LIPITOR) 20 mg tablet Take 20 mg by mouth daily. Yes Provider, Historical   losartan-hydroCHLOROthiazide (HYZAAR) 100-25 mg per tablet Take 1 Tab by mouth daily. Yes Provider, Historical   metFORMIN (GLUCOPHAGE) 500 mg tablet Take 500 mg by mouth daily (with breakfast). Yes Provider, Historical   folic acid/multivit-min/lutein (CENTRUM SILVER PO) Take 1 Tablet by mouth daily. Provider, Historical   Biktarvy tab tablet  3/10/21   Provider, Historical   hydroCHLOROthiazide (HYDRODIURIL) 25 mg tablet Take 25 mg by mouth daily. 3/1/21   Provider, Historical   icudlxngx-psiehphb-iosfnxk ala (Biktarvy) tab tablet Take 1 Tab by mouth daily. Patient not taking: Reported on 10/26/2021 4/6/21   Ute Gaviria MD   acetaminophen-codeine (TYLENOL #3) 300-30 mg per tablet Take 1 Tab by mouth every four (4) hours as needed for Pain. Patient not taking: Reported on 10/26/2021    Provider, Historical   furosemide (LASIX) 20 mg tablet Take  by mouth daily. Patient not taking: Reported on 10/26/2021    Provider, Historical   PEG 3350-Electrolytes (Gavilyte-C) 240-22.72-6.72 -5.84 gram solution Take 4 L by mouth now. Patient not taking: Reported on 10/26/2021    Provider, Historical   HYDROcodone-acetaminophen (NORCO) 5-325 mg per tablet Take  by mouth. Patient not taking: Reported on 10/26/2021    Provider, Historical   sAXagliptin (Onglyza) 5 mg tab tablet Take 5 mg by mouth daily. Provider, Historical   oxybutynin chloride XL (DITROPAN XL) 5 mg CR tablet Take 5 mg by mouth daily.   Patient not taking: Reported on 10/26/2021    Provider, Historical   trimethoprim-sulfamethoxazole (BACTRIM DS, SEPTRA DS) 160-800 mg per tablet Take 1 Tab by mouth two (2) times a day. Patient not taking: Reported on 10/26/2021    Provider, Historical   efavirenz (Sustiva) 600 mg tablet Take 600 mg by mouth nightly. Patient not taking: Reported on 10/26/2021    Provider, Historical   emtricitabine-tenofovir, TDF, (Truvada) 200-300 mg per tablet Take  by mouth daily. Patient not taking: Reported on 10/26/2021    Provider, Historical       Recent Results (from the past 24 hour(s))   GLUCOSE, POC    Collection Time: 10/29/21 12:14 PM   Result Value Ref Range    Glucose (POC) 155 (H) 65 - 117 mg/dL    Performed by Jonny Thompson    GLUCOSE, POC    Collection Time: 10/29/21  4:05 PM   Result Value Ref Range    Glucose (POC) 135 (H) 65 - 117 mg/dL    Performed by Jonny Thompson    GLUCOSE, POC    Collection Time: 10/29/21  9:07 PM   Result Value Ref Range    Glucose (POC) 136 (H) 65 - 117 mg/dL    Performed by AIDE SALDIVAR    CBC WITH AUTOMATED DIFF    Collection Time: 10/30/21  8:57 AM   Result Value Ref Range    WBC 12.1 (H) 3.6 - 11.0 K/uL    RBC 3.79 (L) 3.80 - 5.20 M/uL    HGB 10.5 (L) 11.5 - 16.0 g/dL    HCT 32.7 (L) 35.0 - 47.0 %    MCV 86.3 80.0 - 99.0 FL    MCH 27.7 26.0 - 34.0 PG    MCHC 32.1 30.0 - 36.5 g/dL    RDW 14.5 11.5 - 14.5 %    PLATELET 447 734 - 511 K/uL    MPV 10.4 8.9 - 12.9 FL    NRBC 0.0 0.0  WBC    ABSOLUTE NRBC 0.00 0.00 - 0.01 K/uL    NEUTROPHILS PENDING %    LYMPHOCYTES PENDING %    MONOCYTES PENDING %    EOSINOPHILS PENDING %    BASOPHILS PENDING %    IMMATURE GRANULOCYTES PENDING %    ABS. NEUTROPHILS PENDING K/UL    ABS. LYMPHOCYTES PENDING K/UL    ABS. MONOCYTES PENDING K/UL    ABS. EOSINOPHILS PENDING K/UL    ABS. BASOPHILS PENDING K/UL    ABS. IMM. GRANS.  PENDING K/UL    DF PENDING    METABOLIC PANEL, BASIC    Collection Time: 10/30/21  8:57 AM   Result Value Ref Range    Sodium 143 136 - 145 mmol/L    Potassium 3.4 (L) 3.5 - 5.1 mmol/L    Chloride 110 (H) 97 - 108 mmol/L    CO2 24 21 - 32 mmol/L Anion gap 9 5 - 15 mmol/L    Glucose 119 (H) 65 - 100 mg/dL    BUN 11 6 - 20 mg/dL    Creatinine 0.53 (L) 0.55 - 1.02 mg/dL    BUN/Creatinine ratio 21 (H) 12 - 20      GFR est AA >60 >60 ml/min/1.73m2    GFR est non-AA >60 >60 ml/min/1.73m2    Calcium 9.1 8.5 - 10.1 mg/dL           Plan:   1. Transfer to 4 W. with telemetry  2. Obtain serial cardiac enzymes  3. Continue to monitor serum electrolytes, renal function (replace, recheck serum potassium)  4. Obtain complete 2D echocardiogram  5. Start IV diltiazem once on 4 W.  6.  Stop atenolol  7.   Further recommendations depending on above results, and clinical course   Elina Glover MD

## 2021-10-31 ENCOUNTER — APPOINTMENT (OUTPATIENT)
Dept: GENERAL RADIOLOGY | Age: 81
DRG: 329 | End: 2021-10-31
Attending: INTERNAL MEDICINE
Payer: MEDICARE

## 2021-10-31 LAB
ANION GAP SERPL CALC-SCNC: 7 MMOL/L (ref 5–15)
ATRIAL RATE: 290 BPM
BUN SERPL-MCNC: 9 MG/DL (ref 6–20)
BUN/CREAT SERPL: 24 (ref 12–20)
CA-I BLD-MCNC: 9 MG/DL (ref 8.5–10.1)
CALCULATED P AXIS, ECG09: -108 DEGREES
CALCULATED R AXIS, ECG10: -9 DEGREES
CALCULATED T AXIS, ECG11: -77 DEGREES
CHLORIDE SERPL-SCNC: 110 MMOL/L (ref 97–108)
CO2 SERPL-SCNC: 25 MMOL/L (ref 21–32)
CREAT SERPL-MCNC: 0.37 MG/DL (ref 0.55–1.02)
DIAGNOSIS, 93000: NORMAL
ERYTHROCYTE [DISTWIDTH] IN BLOOD BY AUTOMATED COUNT: 14.6 % (ref 11.5–14.5)
GLUCOSE BLD STRIP.AUTO-MCNC: 117 MG/DL (ref 65–117)
GLUCOSE BLD STRIP.AUTO-MCNC: 120 MG/DL (ref 65–117)
GLUCOSE SERPL-MCNC: 141 MG/DL (ref 65–100)
HCT VFR BLD AUTO: 32.5 % (ref 35–47)
HGB BLD-MCNC: 10.7 G/DL (ref 11.5–16)
MAGNESIUM SERPL-MCNC: 1.8 MG/DL (ref 1.6–2.4)
MCH RBC QN AUTO: 28 PG (ref 26–34)
MCHC RBC AUTO-ENTMCNC: 32.9 G/DL (ref 30–36.5)
MCV RBC AUTO: 85.1 FL (ref 80–99)
NRBC # BLD: 0 K/UL (ref 0–0.01)
NRBC BLD-RTO: 0 PER 100 WBC
PERFORMED BY, TECHID: ABNORMAL
PERFORMED BY, TECHID: NORMAL
PLATELET # BLD AUTO: 193 K/UL (ref 150–400)
PMV BLD AUTO: 10.1 FL (ref 8.9–12.9)
POTASSIUM SERPL-SCNC: 3.6 MMOL/L (ref 3.5–5.1)
Q-T INTERVAL, ECG07: 282 MS
QRS DURATION, ECG06: 74 MS
QTC CALCULATION (BEZET), ECG08: 438 MS
RBC # BLD AUTO: 3.82 M/UL (ref 3.8–5.2)
SODIUM SERPL-SCNC: 142 MMOL/L (ref 136–145)
VENTRICULAR RATE, ECG03: 145 BPM
WBC # BLD AUTO: 12.3 K/UL (ref 3.6–11)

## 2021-10-31 PROCEDURE — 82962 GLUCOSE BLOOD TEST: CPT

## 2021-10-31 PROCEDURE — 74011250636 HC RX REV CODE- 250/636: Performed by: SURGERY

## 2021-10-31 PROCEDURE — 74011000258 HC RX REV CODE- 258: Performed by: INTERNAL MEDICINE

## 2021-10-31 PROCEDURE — 36415 COLL VENOUS BLD VENIPUNCTURE: CPT

## 2021-10-31 PROCEDURE — 83735 ASSAY OF MAGNESIUM: CPT

## 2021-10-31 PROCEDURE — 74011000258 HC RX REV CODE- 258: Performed by: SURGERY

## 2021-10-31 PROCEDURE — 80048 BASIC METABOLIC PNL TOTAL CA: CPT

## 2021-10-31 PROCEDURE — 74011250636 HC RX REV CODE- 250/636: Performed by: INTERNAL MEDICINE

## 2021-10-31 PROCEDURE — 65270000029 HC RM PRIVATE

## 2021-10-31 PROCEDURE — 71045 X-RAY EXAM CHEST 1 VIEW: CPT

## 2021-10-31 PROCEDURE — 74011250637 HC RX REV CODE- 250/637: Performed by: SURGERY

## 2021-10-31 PROCEDURE — 85027 COMPLETE CBC AUTOMATED: CPT

## 2021-10-31 RX ORDER — ENOXAPARIN SODIUM 100 MG/ML
40 INJECTION SUBCUTANEOUS EVERY 24 HOURS
Status: DISCONTINUED | OUTPATIENT
Start: 2021-10-31 | End: 2021-11-03

## 2021-10-31 RX ORDER — FUROSEMIDE 10 MG/ML
40 INJECTION INTRAMUSCULAR; INTRAVENOUS EVERY 12 HOURS
Status: DISCONTINUED | OUTPATIENT
Start: 2021-10-31 | End: 2021-11-04 | Stop reason: HOSPADM

## 2021-10-31 RX ORDER — FUROSEMIDE 10 MG/ML
20 INJECTION INTRAMUSCULAR; INTRAVENOUS ONCE
Status: DISCONTINUED | OUTPATIENT
Start: 2021-10-31 | End: 2021-10-31

## 2021-10-31 RX ADMIN — ENOXAPARIN SODIUM 40 MG: 40 INJECTION SUBCUTANEOUS at 14:12

## 2021-10-31 RX ADMIN — FUROSEMIDE 40 MG: 10 INJECTION INTRAMUSCULAR; INTRAVENOUS at 14:13

## 2021-10-31 RX ADMIN — PIPERACILLIN AND TAZOBACTAM 3.38 G: 3; .375 INJECTION, POWDER, LYOPHILIZED, FOR SOLUTION INTRAVENOUS at 14:13

## 2021-10-31 RX ADMIN — ATENOLOL 50 MG: 25 TABLET ORAL at 08:44

## 2021-10-31 RX ADMIN — FUROSEMIDE 40 MG: 10 INJECTION INTRAMUSCULAR; INTRAVENOUS at 20:43

## 2021-10-31 RX ADMIN — AMIODARONE HYDROCHLORIDE 0.5 MG/MIN: 50 INJECTION, SOLUTION INTRAVENOUS at 20:33

## 2021-10-31 RX ADMIN — PIPERACILLIN AND TAZOBACTAM 3.38 G: 3; .375 INJECTION, POWDER, LYOPHILIZED, FOR SOLUTION INTRAVENOUS at 21:14

## 2021-10-31 RX ADMIN — AMIODARONE HYDROCHLORIDE 1 MG/MIN: 50 INJECTION, SOLUTION INTRAVENOUS at 12:28

## 2021-10-31 RX ADMIN — PIPERACILLIN AND TAZOBACTAM 3.38 G: 3; .375 INJECTION, POWDER, LYOPHILIZED, FOR SOLUTION INTRAVENOUS at 05:20

## 2021-10-31 NOTE — PROGRESS NOTES
POD # 3    Chief Complaint:      Subjective:  Ms. Isabella Odell is a 80y.o. year old * female S/P Exploratory  Laparotomy, extensive lysis of adhesion resiting of colostomy to right lower quadrant with repair of left parastomal hernia and midline incisional hernia and partial resection of left colon. Still tachycardic and Dr. Giselle Fraser has switched from Cardizem to amiodarone infusion. She is slightly tachypneic on nasal oxygen. Ostomy had some stool and gas this morning. No nausea /vomiting. No fever or chills. No abdominal pain    Transthoracic echocardiogram still pending. Review of Systems:   Constitutional:  no fever,  no chills,  no sweats, No weakness, No fatigue, No decreased activity. Respiratory:  shortness of breath, No cough, No sputum production, No hemoptysis, No wheezing, No cyanosis. Cardiovascular: No chest pain, No palpitations, No bradycardia,  tachycardia, No peripheral edema, No syncope. Gastrointestinal: No nausea,  No vomiting, No diarrhea, No constipation, No heartburn, No abdominal pain. Genitourinary: No dysuria, No hematuria, No change in urine stream, No urethral discharge, No lesions. Musculoskeletal: No back pain, No neck pain, No joint pain, No muscle pain, No claudication, No decreased range of motion, No trauma. Integumentary: No rash, No pruritus, No abrasions. Neurologic: Alert and oriented X4, No abnormal balance, No headache, No confusion, No numbness, No tingling. Psychiatric: No anxiety, No depression, No rosales. Physical Exam:     Vitals & Measurements:     Wt Readings from Last 3 Encounters:   10/28/21 89.6 kg (197 lb 8.5 oz)   10/26/21 89.6 kg (197 lb 8.5 oz)   07/06/21 87.5 kg (193 lb)     Temp Readings from Last 3 Encounters:   10/31/21 98.6 °F (37 °C)   10/26/21 98.1 °F (36.7 °C)   07/06/21 97.7 °F (36.5 °C) (Temporal)     BP Readings from Last 3 Encounters:   10/31/21 132/77   10/26/21 (!) 158/71   07/06/21 126/80     Pulse Readings from Last 3 Encounters:   10/31/21 (!) 107   10/26/21 67   07/06/21 63      Ht Readings from Last 3 Encounters:   10/28/21 5' 5.35\" (1.66 m)   10/26/21 5' 5.35\" (1.66 m)   07/06/21 5' 7\" (1.702 m)      Date 10/30/21 0700 - 10/31/21 0659 10/31/21 0700 - 11/01/21 0659   Shift 4662-7121 2353-2469 24 Hour Total 6575-2424 3011-5212 24 Hour Total   INTAKE   I.V.(mL/kg/hr) 7233.7(6.7) 100(0.1) 7333.7(3.4)        Volume (lactated Ringers infusion) 675.3  675.3        Volume (lactated Ringers infusion) 1408.3  1408.3        Volume (sodium chloride 0.9 % bolus infusion 250 mL) 500  500        Volume (sodium chloride 0.9 % bolus infusion 250 mL) 4550  4550        Volume (piperacillin-tazobactam (ZOSYN) 3.375 g in 0.9% sodium chloride (MBP/ADV) 100 mL MBP) 100  100        Volume (potassium chloride 10 mEq in 100 ml IVPB)  100 100      Shift Total(mL/kg) 7233. 7(80.7) 100(1.1) 7333. 7(81.8)      OUTPUT   Urine(mL/kg/hr)  200(0.2) 200(0.1)        Urine Voided  200 200        Urine Occurrence(s) 4 x  4 x      Stool    200  200     Output (ml) (Colostomy Right ;Mid Abdomen)    200  200   Shift Total(mL/kg)  663(6.3) 414(1.0) 200(2.2)  200(2.2)   NET 7233.7 -100 7133.7 -200  -200   Weight (kg) 89.6 89.6 89.6 89.6 89.6 89.6       General: well appearing, no acute distress, looks very comfortable. Respiratory: normal chest wall expansion, CTA B, no r/r/w, no rubs, tachypneic  Cardiovascular: Irregularly irregular, no m/r/g, Normal S1 and S2  Abdomen: Soft, non tender, non-distended, normal bowel sounds in all quadrants, no hepatosplenomegaly, no tympany. Incision scar: Skin intact. Stoma viable. Gas and stool in the bag.     Genitourinary: No inguinal hernia, normal external gentalia, no renal angle tenderness  Musculoskeletal: normal ROM in upper and lower extremities  Integumentary: warm, dry, and pink, with no rash, purpura, or petechia  Neurological:Cranial Nerves II-XII grossly intact, No sensory or motor deficit  Psychiatric: Cooperative with normal mood, affect, and cognition    Laboratory Values:   Recent Results (from the past 24 hour(s))   GLUCOSE, POC    Collection Time: 10/30/21  8:32 PM   Result Value Ref Range    Glucose (POC) 146 (H) 65 - 117 mg/dL    Performed by Bria Jimenez, POC    Collection Time: 10/31/21  8:07 AM   Result Value Ref Range    Glucose (POC) 117 65 - 117 mg/dL    Performed by David Pereyra    GLUCOSE, POC    Collection Time: 10/31/21 11:22 AM   Result Value Ref Range    Glucose (POC) 120 (H) 65 - 117 mg/dL    Performed by David Pereyra          Radiology:   XR ABD (KUB)   Final Result   Findings/impression:      Gaseous prominence of bowel throughout the abdomen, suggesting ileus or   low-grade obstruction. No supine evidence of pneumoperitoneum. Skin staples noted in the right lower quadrant/pelvis. Right pleural effusion noted. No acute osseous abnormality identified. XR CHEST PORT    (Results Pending)         Assessment:  Problem List Items Addressed This Visit     None       Large Incarcerated Parastomal hernia with incisional hernia  S/P Exploratory  Laparotomy, extensive lysis of adhesion resiting of colostomy to right lower quadrant with repair of left parastomal hernia and midline incisional hernia and partial resection of left colon, POD #3        Plan:    1. Admission  2. Diet:full liquids  3. IV fluids: Reduce the infusion to 60 mL/h.  4. SCD  5. IS  6. Pain medications  7. Antibiotics: Zosyn  8. Nausea medication  9. Place purewick. 10. Add Lasix 20 mg IV stat. 11. To chair and ambulate. 12. Labs & Radiology: In the a.m.  13. Appreciate Cardiology Consultation. 15. Hospitalist consultation for medical management. 15. Discussed with RN regarding transthoracic echocardiogram to be done as soon as possible. 16. Lovenox 40 mg sq starting today. 17. Plan discussed with patient and family and answered all their questions.      Thank you for allowing me to participate in the care of this patient.

## 2021-10-31 NOTE — CONSULTS
Hospitalist Progress Note    NAME: Zaire Filter   :  1940   MRN:  [id number]         Subjective:     Chief Complaint / Reason for Physician Visit    I was asked to see this patient for medical management by Dr. Gordo Hernandez, patient had presented with small bowel obstruction, status post exploratory laparotomy with extensive lysis of adhesions, postop day 3    Patient seen and examined at bedside, of note patient complaining of significant shortness of breath, currently requiring 2 L nasal cannula. Discussed with RN events overnight. Review of Systems:  Symptom Y/N Comments  Symptom Y/N Comments   Fever/Chills N   Chest Pain N    Poor Appetite N   Edema N    Cough N   Abdominal Pain N    Sputum N   Joint Pain N    SOB/FLORES Y   Pruritis/Rash N    Nausea/vomit N   Tolerating PT/OT NA    Diarrhea N   Tolerating Diet Y    Constipation N   Other       Could NOT obtain due to:      Objective:     VITALS:   Last 24hrs VS reviewed since prior progress note. Most recent are:  Patient Vitals for the past 24 hrs:   Temp Pulse Resp BP SpO2   10/31/21 1215  (!) 107  132/77 97 %   10/31/21 0830 98.6 °F (37 °C) (!) 117  122/64 98 %   10/31/21 0248 99 °F (37.2 °C) (!) 133 20 (!) 137/94    10/30/21 2358  (!) 133 20 (!) 137/94 97 %   10/30/21 1909  75 20 119/64 95 %   10/30/21 1600  (!) 134          Intake/Output Summary (Last 24 hours) at 10/31/2021 1307  Last data filed at 10/31/2021 0710  Gross per 24 hour   Intake 6420.66 ml   Output 400 ml   Net 6020.66 ml        PHYSICAL EXAM:  General: Patient appears comfortable   EENT:  EOMI. Anicteric sclerae. MMM  Resp:  Decreased air entry bilaterally with appreciable bilateral bibasilar crackles  CV:  Regular  rhythm,  S1 plus S2, no murmurs rubs or gallop, no edema  GI:  Soft, Non distended, Non tender. +Bowel sounds  Neurologic:  Alert and oriented X 3, normal speech,   Psych:   Good insight. Not anxious nor agitated  Skin:  No rashes.   No jaundice    Procedures: see electronic medical records for all procedures/Xrays and details which were not copied into this note but were reviewed prior to creation of Plan. LABS:  I reviewed today's most current labs and imaging studies. Pertinent labs include:  Recent Labs     10/30/21  0857 10/29/21  0910   WBC 12.1* 9.3   HGB 10.5* 11.1*   HCT 32.7* 34.4*    187     Recent Labs     10/30/21  0857 10/29/21  0910    141   K 3.4* 3.4*   * 108   CO2 24 26   * 169*   BUN 11 14   CREA 0.53* 0.70   CA 9.1 9.0       Signed: Sergio Petit MD    CT abdomen/pelvis:IMPRESSION  1. Left ostomy site has increased in size, and now contains an enlarging  parastomal hernia of distal transverse colon. Small ascites adjacent to the  herniated transverse colon. The bowel is not obstructed. 2. Unchanged common bile duct dilation.     X-ray abdomen:IMPRESSION  Findings/impression:     Gaseous prominence of bowel throughout the abdomen, suggesting ileus or  low-grade obstruction. No supine evidence of pneumoperitoneum.     Skin staples noted in the right lower quadrant/pelvis.     Right pleural effusion noted. No acute osseous abnormality identified.     Reviewed most current lab test results and cultures  YES  Reviewed most current radiology test results   YES  Review and summation of old records today    NO  Reviewed patient's current orders and MAR    YES  PMH/SH reviewed - no change compared to H&P      Assessment / Plan:  Status post exploratory laparotomy with extensive lysis of adhesions postop day 3currently patient doing well in the postoperative period, tolerating p.o., is pain-free  Continue clear liquid diet, advance diet as per general surgery recommendations  Defer management to general surgery attending    Acute respiratory failure with hypoxiaof note patient found to have shortness of breath as well as acute respiratory failure with hypoxia requiring 2 L nasal cannula for ventilatory support, based on patient's clinical presentation my inclination is patient is in volume overload, patient received significant fluid resuscitation throughout the course of this admission  Discontinue IV fluids  Stat chest x-ray  Lasix 40 mg IV twice daily  Obtain transthoracic echo  Attempt to wean oxygen  Continue to follow cardiology recommendations    Hypokalemiaobtain repeat potassium to assess for resolution    Atrial fibrillation with RVRof note patient developed significant atrial fibrillation with RVR  Continuous telemetry monitoring  Continue amiodarone gtt. Follow-up transthoracic echo  Continue to follow cardiology recommendations    ProphylaxisSCDs  FENclear liquid diet, advance diet as per general surgery recommendations, replete potassium and magnesium      I thank you Dr. Sandra Oakes for allowing me to participate in the care of your patient, the hospitalist service will continue to follow      18.5 - 24.9 Normal weight / Body mass index is 32.52 kg/m². Code status: Full  Prophylaxis: SCD's  Recommended Disposition: TBD     ________________________________________________________________________  Care Plan discussed with:    Comments   Patient x    Family      RN x    Care Manager x    Consultant  x                     x Multidiciplinary team rounds were held today with , nursing, pharmacist and clinical coordinator. Patient's plan of care was discussed; medications were reviewed and discharge planning was addressed.      ________________________________________________________________________  Total NON critical care TIME:  35   Minutes      Comments   >50% of visit spent in counseling and coordination of care x    ________________________________________________________________________  Delilah Mcneill MD

## 2021-10-31 NOTE — PROGRESS NOTES
Progress Note      10/31/2021 6:59 AM  NAME: Merline Lawman   MRN:  [id number]   Admit Diagnosis: Obstruction due to parastomal hernia [K43.3]  S/P exploratory laparotomy [Z98.890]      Problem List:   1. Status post exploratory laparotomy with extensive lysis of adhesion  2. History of colon cancer  3. Chronic pain  4. Atrial fibrillation with rapid ventricular response  5. Hypertension  6. Dyslipidemia  7. Type 2 diabetes  8. Human immunodeficiency virus infection (HIV)  9. Tinnitus  10. Hearing impaired     11. Mild leukocytosis   12. Anemia  13. Electrolyte abnormalities (hypokalemia, and hyperchloremia)       Subjective: The patient is seen and examined in room 469. There were no acute cardiovascular events reported overnight. Currently, she denies any cardiovascular complaints. She specifically denies chest pain, shortness of breath, or awareness of rapid heart rate. The patient remains in atrial fibrillation with rapid response despite IV diltiazem. Medications Personally Reviewed:    Current Facility-Administered Medications   Medication Dose Route Frequency    atenoloL (TENORMIN) tablet 50 mg  50 mg Oral DAILY    morphine injection 2 mg  2 mg IntraVENous Q3H PRN    dilTIAZem 125 mg in dextrose 5% 100 mL IVPB   IntraVENous CONTINUOUS    lactated Ringers infusion  125 mL/hr IntraVENous CONTINUOUS    piperacillin-tazobactam (ZOSYN) 3.375 g in 0.9% sodium chloride (MBP/ADV) 100 mL MBP  3.375 g IntraVENous Q8H    ondansetron (ZOFRAN) injection 4 mg  4 mg IntraVENous Q6H PRN           Objective:      Physical Exam:  Essentially unchanged  Last 24hrs VS reviewed since prior progress note.  Most recent are:    Visit Vitals  BP (!) 137/94   Pulse (!) 133   Temp 99 °F (37.2 °C)   Resp 20   Ht 5' 5.35\" (1.66 m)   Wt 89.6 kg (197 lb 8.5 oz)   SpO2 97%   BMI 32.52 kg/m²       Intake/Output Summary (Last 24 hours) at 10/31/2021 0659  Last data filed at 10/30/2021 2348  Gross per 24 hour   Intake 7333.66 ml   Output 200 ml   Net 7133.66 ml        Data Review    Telemetry: Atrial fibrillation with rapid ventricular response. EKG:   []  No new EKG for review    Lab Data Personally Reviewed:    Recent Labs     10/30/21  0857 10/29/21  0910   WBC 12.1* 9.3   HGB 10.5* 11.1*   HCT 32.7* 34.4*    187     No results for input(s): INR, PTP, APTT, INREXT in the last 72 hours. Recent Labs     10/30/21  0857 10/29/21  0910    141   K 3.4* 3.4*   * 108   CO2 24 26   BUN 11 14   CREA 0.53* 0.70   * 169*   CA 9.1 9.0     No results for input(s): CPK, CKNDX, TROIQ in the last 72 hours. No lab exists for component: CPKMB  No results found for: CHOL, CHOLX, CHLST, CHOLV, HDL, HDLP, LDL, LDLC, DLDLP, TGLX, TRIGL, TRIGP, CHHD, CHHDX    No results for input(s): AP, TBIL, TP, ALB, GLOB, GGT, AML, LPSE in the last 72 hours. No lab exists for component: SGOT, GPT, AMYP, HLPSE  No results for input(s): PH, PCO2, PO2 in the last 72 hours. Assessment/Plan:   1. Continue telemetry monitoring  2. Continue to monitor serum electrolytes, renal function  3. Check thyroid function test  4. Obtain complete 2D echocardiogram  5. Stop IV diltiazem, and atenolol    6. Start IV amiodarone  7. Consider direct-current (DC) cardioversion if chemical cardioversion is unsuccessful 24 hours  8.   Consider oral anticoagulation unless contraindicated     Javier Starkey MD

## 2021-11-01 ENCOUNTER — APPOINTMENT (OUTPATIENT)
Dept: NON INVASIVE DIAGNOSTICS | Age: 81
DRG: 329 | End: 2021-11-01
Attending: INTERNAL MEDICINE
Payer: MEDICARE

## 2021-11-01 LAB
ANION GAP SERPL CALC-SCNC: 6 MMOL/L (ref 5–15)
BUN SERPL-MCNC: 11 MG/DL (ref 6–20)
BUN/CREAT SERPL: 31 (ref 12–20)
CA-I BLD-MCNC: 8.8 MG/DL (ref 8.5–10.1)
CHLORIDE SERPL-SCNC: 108 MMOL/L (ref 97–108)
CO2 SERPL-SCNC: 29 MMOL/L (ref 21–32)
CREAT SERPL-MCNC: 0.35 MG/DL (ref 0.55–1.02)
ERYTHROCYTE [DISTWIDTH] IN BLOOD BY AUTOMATED COUNT: 14.1 % (ref 11.5–14.5)
GLUCOSE BLD STRIP.AUTO-MCNC: 117 MG/DL (ref 65–117)
GLUCOSE BLD STRIP.AUTO-MCNC: 123 MG/DL (ref 65–117)
GLUCOSE BLD STRIP.AUTO-MCNC: 195 MG/DL (ref 65–117)
GLUCOSE SERPL-MCNC: 130 MG/DL (ref 65–100)
HCT VFR BLD AUTO: 31.4 % (ref 35–47)
HGB BLD-MCNC: 10.2 G/DL (ref 11.5–16)
MAGNESIUM SERPL-MCNC: 1.6 MG/DL (ref 1.6–2.4)
MCH RBC QN AUTO: 27.6 PG (ref 26–34)
MCHC RBC AUTO-ENTMCNC: 32.5 G/DL (ref 30–36.5)
MCV RBC AUTO: 84.9 FL (ref 80–99)
NRBC # BLD: 0 K/UL (ref 0–0.01)
NRBC BLD-RTO: 0 PER 100 WBC
PERFORMED BY, TECHID: ABNORMAL
PERFORMED BY, TECHID: ABNORMAL
PERFORMED BY, TECHID: NORMAL
PLATELET # BLD AUTO: 183 K/UL (ref 150–400)
PMV BLD AUTO: 10.3 FL (ref 8.9–12.9)
POTASSIUM SERPL-SCNC: 3.1 MMOL/L (ref 3.5–5.1)
RBC # BLD AUTO: 3.7 M/UL (ref 3.8–5.2)
SODIUM SERPL-SCNC: 143 MMOL/L (ref 136–145)
WBC # BLD AUTO: 7.7 K/UL (ref 3.6–11)

## 2021-11-01 PROCEDURE — 74011000258 HC RX REV CODE- 258: Performed by: SURGERY

## 2021-11-01 PROCEDURE — 83735 ASSAY OF MAGNESIUM: CPT

## 2021-11-01 PROCEDURE — 74011250636 HC RX REV CODE- 250/636: Performed by: SURGERY

## 2021-11-01 PROCEDURE — 80048 BASIC METABOLIC PNL TOTAL CA: CPT

## 2021-11-01 PROCEDURE — 74011250636 HC RX REV CODE- 250/636: Performed by: INTERNAL MEDICINE

## 2021-11-01 PROCEDURE — 74011250637 HC RX REV CODE- 250/637: Performed by: INTERNAL MEDICINE

## 2021-11-01 PROCEDURE — 74011250637 HC RX REV CODE- 250/637: Performed by: SURGERY

## 2021-11-01 PROCEDURE — 82962 GLUCOSE BLOOD TEST: CPT

## 2021-11-01 PROCEDURE — 85027 COMPLETE CBC AUTOMATED: CPT

## 2021-11-01 PROCEDURE — 36415 COLL VENOUS BLD VENIPUNCTURE: CPT

## 2021-11-01 PROCEDURE — 65270000029 HC RM PRIVATE

## 2021-11-01 PROCEDURE — 74011250637 HC RX REV CODE- 250/637: Performed by: HOSPITALIST

## 2021-11-01 RX ORDER — ACETAMINOPHEN 325 MG/1
650 TABLET ORAL
Status: DISCONTINUED | OUTPATIENT
Start: 2021-11-01 | End: 2021-11-04 | Stop reason: HOSPADM

## 2021-11-01 RX ORDER — METOPROLOL SUCCINATE 25 MG/1
25 TABLET, EXTENDED RELEASE ORAL DAILY
Status: DISCONTINUED | OUTPATIENT
Start: 2021-11-01 | End: 2021-11-03

## 2021-11-01 RX ADMIN — FUROSEMIDE 40 MG: 10 INJECTION INTRAMUSCULAR; INTRAVENOUS at 09:04

## 2021-11-01 RX ADMIN — ENOXAPARIN SODIUM 40 MG: 40 INJECTION SUBCUTANEOUS at 15:04

## 2021-11-01 RX ADMIN — PIPERACILLIN AND TAZOBACTAM 3.38 G: 3; .375 INJECTION, POWDER, LYOPHILIZED, FOR SOLUTION INTRAVENOUS at 05:06

## 2021-11-01 RX ADMIN — ATENOLOL 50 MG: 25 TABLET ORAL at 09:04

## 2021-11-01 RX ADMIN — PIPERACILLIN AND TAZOBACTAM 3.38 G: 3; .375 INJECTION, POWDER, LYOPHILIZED, FOR SOLUTION INTRAVENOUS at 21:45

## 2021-11-01 RX ADMIN — METOPROLOL SUCCINATE 25 MG: 25 TABLET, EXTENDED RELEASE ORAL at 12:19

## 2021-11-01 RX ADMIN — FUROSEMIDE 40 MG: 10 INJECTION INTRAMUSCULAR; INTRAVENOUS at 21:45

## 2021-11-01 RX ADMIN — PIPERACILLIN AND TAZOBACTAM 3.38 G: 3; .375 INJECTION, POWDER, LYOPHILIZED, FOR SOLUTION INTRAVENOUS at 12:20

## 2021-11-01 RX ADMIN — ACETAMINOPHEN 650 MG: 325 TABLET ORAL at 21:45

## 2021-11-01 NOTE — PROGRESS NOTES
POD # 4    Chief Complaint:      Subjective:  Ms. Elizabeth Gauthier is a 80y.o. year old * female S/P Exploratory  Laparotomy, extensive lysis of adhesion resiting of colostomy to right lower quadrant with repair of left parastomal hernia and midline incisional hernia and partial resection of left colon. Back to sinus rhythm & rate controlled. Dr. Sonya Pinon has switched from Amiodarone to Metoprolol. Still slightly tachypneic on nasal oxygen 2 litres. Unfortunately Transthoracic echocardiogram has not been done for the past 3 days. Ostomy had some stool and gas this morning. No nausea /vomiting. No fever or chills. No abdominal pain. Has been tolerating full liquids. Review of Systems:   Constitutional:  no fever,  no chills,  no sweats, No weakness, No fatigue, No decreased activity. Respiratory:  shortness of breath, No cough, No sputum production, No hemoptysis, No wheezing, No cyanosis. Cardiovascular: No chest pain, No palpitations, No bradycardia,  tachycardia, No peripheral edema, No syncope. Gastrointestinal: No nausea,  No vomiting, No diarrhea, No constipation, No heartburn, No abdominal pain. Genitourinary: No dysuria, No hematuria, No change in urine stream, No urethral discharge, No lesions. Musculoskeletal: No back pain, No neck pain, No joint pain, No muscle pain, No claudication, No decreased range of motion, No trauma. Integumentary: No rash, No pruritus, No abrasions. Neurologic: Alert and oriented X4, No abnormal balance, No headache, No confusion, No numbness, No tingling. Psychiatric: No anxiety, No depression, No rosales. Physical Exam:     Vitals & Measurements:     Wt Readings from Last 3 Encounters:   10/28/21 89.6 kg (197 lb 8.5 oz)   10/26/21 89.6 kg (197 lb 8.5 oz)   07/06/21 87.5 kg (193 lb)     Temp Readings from Last 3 Encounters:   11/01/21 98.5 °F (36.9 °C)   10/26/21 98.1 °F (36.7 °C)   07/06/21 97.7 °F (36.5 °C) (Temporal)     BP Readings from Last 3 Encounters:   11/01/21 137/73   10/26/21 (!) 158/71   07/06/21 126/80     Pulse Readings from Last 3 Encounters:   11/01/21 79   10/26/21 67   07/06/21 63      Ht Readings from Last 3 Encounters:   10/28/21 5' 5.35\" (1.66 m)   10/26/21 5' 5.35\" (1.66 m)   07/06/21 5' 7\" (1.702 m)      Date 10/31/21 0700 - 11/01/21 0659 11/01/21 0700 - 11/02/21 0659   Shift 3909-1195 8010-1353 24 Hour Total 6115-1646 9426-4486 24 Hour Total   INTAKE   Shift Total(mL/kg)         OUTPUT   Urine(mL/kg/hr) 1800(1.7) 1500(1.4) 3300(1.5) 1000  1000     Urine Voided 1800 1500 3300 1000  1000     Urine Occurrence(s)  1 x 1 x      Stool 200 125 325 15  15     Output (ml) (Colostomy Right ;Mid Abdomen) 200 125 325 15  15   Shift Total(mL/kg) 2972(64.3) 1625(18.1) 3625(40.5) 1015(11.3)  1015(11.3)   NET -2000 -1625 -3625 -1015  -1015   Weight (kg) 89.6 89.6 89.6 89.6 89.6 89.6       General: well appearing, no acute distress, looks very comfortable. Respiratory: normal chest wall expansion, CTA B, no r/r/w, no rubs, tachypneic  Cardiovascular: Irregularly irregular, no m/r/g, Normal S1 and S2  Abdomen: Soft, non tender, non-distended, normal bowel sounds in all quadrants, no hepatosplenomegaly, no tympany. Incision scar: Skin intact. Stoma viable. Gas and stool in the bag.     Genitourinary: No inguinal hernia, normal external gentalia, no renal angle tenderness  Musculoskeletal: normal ROM in upper and lower extremities  Integumentary: warm, dry, and pink, with no rash, purpura, or petechia  Neurological:Cranial Nerves II-XII grossly intact, No sensory or motor deficit  Psychiatric: Cooperative with normal mood, affect, and cognition    Laboratory Values:   Recent Results (from the past 24 hour(s))   CBC W/O DIFF    Collection Time: 11/01/21  6:35 AM   Result Value Ref Range    WBC 7.7 3.6 - 11.0 K/uL    RBC 3.70 (L) 3.80 - 5.20 M/uL    HGB 10.2 (L) 11.5 - 16.0 g/dL    HCT 31.4 (L) 35.0 - 47.0 %    MCV 84.9 80.0 - 99.0 FL    MCH 27.6 26.0 - 34.0 PG    MCHC 32.5 30.0 - 36.5 g/dL    RDW 14.1 11.5 - 14.5 %    PLATELET 795 128 - 547 K/uL    MPV 10.3 8.9 - 12.9 FL    NRBC 0.0 0.0  WBC    ABSOLUTE NRBC 0.00 0.00 - 0.71 K/uL   METABOLIC PANEL, BASIC    Collection Time: 11/01/21  6:35 AM   Result Value Ref Range    Sodium 143 136 - 145 mmol/L    Potassium 3.1 (L) 3.5 - 5.1 mmol/L    Chloride 108 97 - 108 mmol/L    CO2 29 21 - 32 mmol/L    Anion gap 6 5 - 15 mmol/L    Glucose 130 (H) 65 - 100 mg/dL    BUN 11 6 - 20 mg/dL    Creatinine 0.35 (L) 0.55 - 1.02 mg/dL    BUN/Creatinine ratio 31 (H) 12 - 20      GFR est AA >60 >60 ml/min/1.73m2    GFR est non-AA >60 >60 ml/min/1.73m2    Calcium 8.8 8.5 - 10.1 mg/dL   MAGNESIUM    Collection Time: 11/01/21  6:35 AM   Result Value Ref Range    Magnesium 1.6 1.6 - 2.4 mg/dL   GLUCOSE, POC    Collection Time: 11/01/21  7:39 AM   Result Value Ref Range    Glucose (POC) 123 (H) 65 - 117 mg/dL    Performed by [address] [place] [address], POC    Collection Time: 11/01/21 12:23 PM   Result Value Ref Range    Glucose (POC) 117 65 - 117 mg/dL    Performed by Brenda Voss          Radiology:   XR CHEST PORT   Final Result   Underexpanded lungs. Bilateral lower lobe airspace disease and   volume loss. I cannot exclude superimposed pneumonia. Small bilateral pleural   effusions. No pneumothorax. XR ABD (KUB)   Final Result   Findings/impression:      Gaseous prominence of bowel throughout the abdomen, suggesting ileus or   low-grade obstruction. No supine evidence of pneumoperitoneum. Skin staples noted in the right lower quadrant/pelvis. Right pleural effusion noted. No acute osseous abnormality identified.                   Assessment:  Problem List Items Addressed This Visit     None       Large Incarcerated Parastomal hernia with incisional hernia  S/P Exploratory  Laparotomy, extensive lysis of adhesion resiting of colostomy to right lower quadrant with repair of left parastomal hernia and midline incisional hernia and partial resection of left colon, POD #3        Plan:    1. Admission  2. Diet:full liquids  3. Heplock IV fluid. 4. SCD  5. IS  6. Pain medications  7. Antibiotics: Zosyn  8. Nausea medication  9. Place purewick. 10. Continue Lasix 20 mg IV. 11. To chair and ambulate. 12. Labs & Radiology: In the a.m.  13. Appreciate Cardiology Consultation & Hospitalist consultation for medical management. 15. Discussed with RN regarding transthoracic echocardiogram to be done as soon as possible. 15. Continue Lovenox 40 mg sq. 16. Advance diet  17. Wean off nasal O2.  18. Possible Home tomorrow. 19. Plan discussed with patient and family and answered all their questions. Thank you for allowing me to participate in the care of this patient.

## 2021-11-01 NOTE — PROGRESS NOTES
Progress Note      11/1/2021 6:45 AM  NAME: Isaac Lancaster   MRN:  [id number]   Admit Diagnosis: Obstruction due to parastomal hernia [K43.3]  S/P exploratory laparotomy [Z98.890]      Problem List:   1.  Status post exploratory laparotomy with extensive lysis of adhesion  2.  History of colon cancer  3.  Chronic pain  4. Atrial fibrillation with rapid ventricular response, converted to sinus rhythm  5.  Hypertension  6.  Dyslipidemia  7.  Type 2 diabetes  8.  Human immunodeficiency virus infection (HIV)  9.  Tinnitus  10. Hearing impaired     11.  Mild leukocytosis   12.  Anemia  13.  Electrolyte abnormalities (hypokalemia, and hyperchloremia)       Subjective: The patient is seen and examined in room 469. There were no acute cardiac aggressive intervention coronary night. Currently, she denies any cardiovascular complaints. Overnight, the patient converted to sinus rhythm. Medications Personally Reviewed:    Current Facility-Administered Medications   Medication Dose Route Frequency    amiodarone (CORDARONE) 375 mg in dextrose 5% 250 mL infusion  0.5-1 mg/min IntraVENous CONTINUOUS    furosemide (LASIX) injection 40 mg  40 mg IntraVENous Q12H    enoxaparin (LOVENOX) injection 40 mg  40 mg SubCUTAneous Q24H    atenoloL (TENORMIN) tablet 50 mg  50 mg Oral DAILY    morphine injection 2 mg  2 mg IntraVENous Q3H PRN    piperacillin-tazobactam (ZOSYN) 3.375 g in 0.9% sodium chloride (MBP/ADV) 100 mL MBP  3.375 g IntraVENous Q8H    ondansetron (ZOFRAN) injection 4 mg  4 mg IntraVENous Q6H PRN           Objective:      Physical Exam:  Last 24hrs VS reviewed since prior progress note.  Most recent are:    Visit Vitals  BP (!) 150/71   Pulse 88   Temp 98.4 °F (36.9 °C)   Resp 20   Ht 5' 5.35\" (1.66 m)   Wt 89.6 kg (197 lb 8.5 oz)   SpO2 96%   BMI 32.52 kg/m²       Intake/Output Summary (Last 24 hours) at 11/1/2021 0645  Last data filed at 11/1/2021 0138  Gross per 24 hour   Intake    Output 0560 ml   Net -3625 ml        General Appearance: Well developed, in no acute respiratory distress. Chest: Lungs clear to auscultation bilaterally. Cardiovascular: JVP is not elevated, PMI is not attempted, normal intensity S1 and S2, without S3. Abdomen: Soft, non-tender, bowel sounds are active. Extremities: No edema bilaterally. Data Review    Telemetry: Sinus rhythm without ventricular ectopy    EKG:   []  No new EKG for review    Lab Data Personally Reviewed:    Recent Labs     10/31/21  1324 10/30/21  0857   WBC 12.3* 12.1*   HGB 10.7* 10.5*   HCT 32.5* 32.7*    165     No results for input(s): INR, PTP, APTT, INREXT in the last 72 hours. Recent Labs     10/31/21  1324 10/30/21  0857 10/29/21  0910    143 141   K 3.6 3.4* 3.4*   * 110* 108   CO2 25 24 26   BUN 9 11 14   CREA 0.37* 0.53* 0.70   * 119* 169*   CA 9.0 9.1 9.0   MG 1.8  --   --      No results for input(s): CPK, CKNDX, TROIQ in the last 72 hours. No lab exists for component: CPKMB  No results found for: CHOL, CHOLX, CHLST, CHOLV, HDL, HDLP, LDL, LDLC, DLDLP, TGLX, TRIGL, TRIGP, CHHD, CHHDX    No results for input(s): AP, TBIL, TP, ALB, GLOB, GGT, AML, LPSE in the last 72 hours. No lab exists for component: SGOT, GPT, AMYP, HLPSE  No results for input(s): PH, PCO2, PO2 in the last 72 hours. Assessment/Plan:   1. Continue telemetry monitoring   2. Continue to monitor serum electrolytes, renal function  3. Obtain complete 2-D echocardiogram  4. Continue current cardiovascular medications including enoxaparin, and furosemide  5. Stop atenolol  6.   Start metoprolol     Quirino Osborn MD

## 2021-11-01 NOTE — PROGRESS NOTES
Progress Note    Patient: Elizabeth Gauthier MRN: [id number]  SSN: [id number]    YOB: 1940  Age: 80 y.o. Sex: female      Admit Date: 10/28/2021    LOS: 4 days     Subjective:     81F, h/o pAFIB not on AC, and left sided colostomy s/t cancer suspicion presents with small bowel obstruction S/p POD 3 right colostomy and resection and incisional hernia repair and left stoma hernia    Her condition was complicated by acute hypoxic respiratory failure and Afib with RVR, was started on lasix and amiodarone, now in NSR. Plan to wean oxygen and ambulate    Cleared from medical stand point    Review of Systems:  Review of Systems:            Symptom Y/N Comments   Symptom Y/N Comments   Fever/Chills N     Chest Pain N      Poor Appetite N     Edema N      Cough N     Abdominal Pain N      Sputum N     Joint Pain N      SOB/FLORES Y     Pruritis/Rash N      Nausea/vomit N     Tolerating PT/OT NA      Diarrhea N     Tolerating Diet Y      Constipation N     Other             Objective:     Vitals:    11/01/21 1146 11/01/21 1224 11/01/21 1519 11/01/21 1549   BP:  137/70 137/73    Pulse: 83 78 79 79   Resp:  18 20    Temp:  98.7 °F (37.1 °C) 98.5 °F (36.9 °C)    SpO2:  98% 99%    Weight:       Height:            Intake and Output:  Current Shift: 11/01 0701 - 11/01 1900  In: -   Out: 6306 [Urine:1000]  Last three shifts: 10/30 1901 - 11/01 0700  In: 100 [I.V.:100]  Out: 3825 [Urine:3500]      Physical Exam:   General:          Patient appears comfortable   EENT:              EOMI. Anicteric sclerae. MMM  Resp:               Decreased air entry bilaterally with appreciable bilateral bibasilar crackles  CV:                  Regular  rhythm,  S1 plus S2, no murmurs rubs or gallop, no edema  GI:                   Soft, Non distended, Non tender.  +Bowel sounds  Neurologic:       Alert and oriented X 3, normal speech,   Psych:   Good insight. Not anxious nor agitated  Skin:                No rashes.   No jaundice      Lab/Data Review:  Recent Results (from the past 24 hour(s))   CBC W/O DIFF    Collection Time: 11/01/21  6:35 AM   Result Value Ref Range    WBC 7.7 3.6 - 11.0 K/uL    RBC 3.70 (L) 3.80 - 5.20 M/uL    HGB 10.2 (L) 11.5 - 16.0 g/dL    HCT 31.4 (L) 35.0 - 47.0 %    MCV 84.9 80.0 - 99.0 FL    MCH 27.6 26.0 - 34.0 PG    MCHC 32.5 30.0 - 36.5 g/dL    RDW 14.1 11.5 - 14.5 %    PLATELET 057 404 - 526 K/uL    MPV 10.3 8.9 - 12.9 FL    NRBC 0.0 0.0  WBC    ABSOLUTE NRBC 0.00 0.00 - 5.94 K/uL   METABOLIC PANEL, BASIC    Collection Time: 11/01/21  6:35 AM   Result Value Ref Range    Sodium 143 136 - 145 mmol/L    Potassium 3.1 (L) 3.5 - 5.1 mmol/L    Chloride 108 97 - 108 mmol/L    CO2 29 21 - 32 mmol/L    Anion gap 6 5 - 15 mmol/L    Glucose 130 (H) 65 - 100 mg/dL    BUN 11 6 - 20 mg/dL    Creatinine 0.35 (L) 0.55 - 1.02 mg/dL    BUN/Creatinine ratio 31 (H) 12 - 20      GFR est AA >60 >60 ml/min/1.73m2    GFR est non-AA >60 >60 ml/min/1.73m2    Calcium 8.8 8.5 - 10.1 mg/dL   MAGNESIUM    Collection Time: 11/01/21  6:35 AM   Result Value Ref Range    Magnesium 1.6 1.6 - 2.4 mg/dL   GLUCOSE, POC    Collection Time: 11/01/21  7:39 AM   Result Value Ref Range    Glucose (POC) 123 (H) 65 - 117 mg/dL    Performed by Amirah Binder    GLUCOSE, POC    Collection Time: 11/01/21 12:23 PM   Result Value Ref Range    Glucose (POC) 117 65 - 117 mg/dL    Performed by Amirah Binder          Assessment and plan:      (1) Acute hypoxic respiratory daily: lasix. Wean O2 to room air s/t pulmonary edema. (2) Afib with RVR: amio gtt, on metoprolola dnDc amio gtt. (3) SBO: s/t ex-lap. eft sided colostomy s/t cancer suspicion presents with small bowel obstruction S/p POD 3 right colostomy and resection and incisional hernia repair and left stoma hernia    Once on room air, she is cleared from medial stand point, does not need lasix on discharge. We will continue to follow.      Thank you !!!! for allowing us to participate in the care of this patient.      Signed By: Marisa Meredith MD     November 1, 2021

## 2021-11-01 NOTE — PROGRESS NOTES
Patient's IV infiltrated this morning after receiving more antibiotic was scanned. Patient did not receive medication.

## 2021-11-01 NOTE — PROGRESS NOTES
Dr. Kianna Owens gave orders to advance diet to soft diet, wean off oxygen, and get patient off in chair. 1708: patient 96% on room air and sitting up in the chair.

## 2021-11-02 ENCOUNTER — APPOINTMENT (OUTPATIENT)
Dept: NON INVASIVE DIAGNOSTICS | Age: 81
DRG: 329 | End: 2021-11-02
Attending: INTERNAL MEDICINE
Payer: MEDICARE

## 2021-11-02 LAB
GLUCOSE BLD STRIP.AUTO-MCNC: 109 MG/DL (ref 65–117)
GLUCOSE BLD STRIP.AUTO-MCNC: 118 MG/DL (ref 65–117)
GLUCOSE BLD STRIP.AUTO-MCNC: 122 MG/DL (ref 65–117)
GLUCOSE BLD STRIP.AUTO-MCNC: 129 MG/DL (ref 65–117)
PERFORMED BY, TECHID: ABNORMAL
PERFORMED BY, TECHID: NORMAL

## 2021-11-02 PROCEDURE — 74011250637 HC RX REV CODE- 250/637: Performed by: INTERNAL MEDICINE

## 2021-11-02 PROCEDURE — 93306 TTE W/DOPPLER COMPLETE: CPT

## 2021-11-02 PROCEDURE — 74011250636 HC RX REV CODE- 250/636: Performed by: SURGERY

## 2021-11-02 PROCEDURE — 93005 ELECTROCARDIOGRAM TRACING: CPT

## 2021-11-02 PROCEDURE — 82962 GLUCOSE BLOOD TEST: CPT

## 2021-11-02 PROCEDURE — 74011000258 HC RX REV CODE- 258: Performed by: SURGERY

## 2021-11-02 PROCEDURE — 74011250636 HC RX REV CODE- 250/636: Performed by: INTERNAL MEDICINE

## 2021-11-02 PROCEDURE — 65270000029 HC RM PRIVATE

## 2021-11-02 RX ORDER — METOPROLOL SUCCINATE 25 MG/1
25 TABLET, EXTENDED RELEASE ORAL DAILY
Qty: 30 TABLET | Refills: 4 | Status: SHIPPED | OUTPATIENT
Start: 2021-11-02 | End: 2022-04-13

## 2021-11-02 RX ORDER — FUROSEMIDE 20 MG/1
40 TABLET ORAL DAILY
Qty: 30 TABLET | Refills: 1 | Status: SHIPPED | OUTPATIENT
Start: 2021-11-02

## 2021-11-02 RX ADMIN — FUROSEMIDE 40 MG: 10 INJECTION INTRAMUSCULAR; INTRAVENOUS at 09:53

## 2021-11-02 RX ADMIN — FUROSEMIDE 40 MG: 10 INJECTION INTRAMUSCULAR; INTRAVENOUS at 21:32

## 2021-11-02 RX ADMIN — METOPROLOL SUCCINATE 25 MG: 25 TABLET, EXTENDED RELEASE ORAL at 09:53

## 2021-11-02 RX ADMIN — PIPERACILLIN AND TAZOBACTAM 3.38 G: 3; .375 INJECTION, POWDER, LYOPHILIZED, FOR SOLUTION INTRAVENOUS at 06:33

## 2021-11-02 RX ADMIN — ENOXAPARIN SODIUM 40 MG: 40 INJECTION SUBCUTANEOUS at 14:18

## 2021-11-02 RX ADMIN — PIPERACILLIN AND TAZOBACTAM 3.38 G: 3; .375 INJECTION, POWDER, LYOPHILIZED, FOR SOLUTION INTRAVENOUS at 21:32

## 2021-11-02 RX ADMIN — PIPERACILLIN AND TAZOBACTAM 3.38 G: 3; .375 INJECTION, POWDER, LYOPHILIZED, FOR SOLUTION INTRAVENOUS at 14:18

## 2021-11-02 NOTE — PROGRESS NOTES
Progress Note    Patient: María Elena Shipley MRN: [id number]  SSN: [id number]    YOB: 1940  Age: 80 y.o. Sex: female      Admit Date: 10/28/2021    LOS: 5 days     Subjective:     81F, h/o pAFIB not on AC, and left sided colostomy s/t cancer suspicion presents with small bowel obstruction S/p POD 3 right colostomy and resection and incisional hernia repair and left stoma hernia    Her condition was complicated by acute hypoxic respiratory failure and Afib with RVR, was started on lasix and amiodarone, now in NSR. On room air    Cleared from medical stand point    Review of Systems:  Review of Systems:            Symptom Y/N Comments   Symptom Y/N Comments   Fever/Chills N     Chest Pain N      Poor Appetite N     Edema N      Cough N     Abdominal Pain N      Sputum N     Joint Pain N      SOB/FLORES Y     Pruritis/Rash N      Nausea/vomit N     Tolerating PT/OT NA      Diarrhea N     Tolerating Diet Y      Constipation N     Other             Objective:     Vitals:    11/02/21 0400 11/02/21 0408 11/02/21 0838 11/02/21 1147   BP:  113/64 131/69 124/65   Pulse: 81 81 82 86   Resp:  22 20 19   Temp:  98 °F (36.7 °C) 98 °F (36.7 °C) 98 °F (36.7 °C)   SpO2:  98% 96% 97%   Weight:       Height:            Intake and Output:  Current Shift: 11/02 0701 - 11/02 1900  In: -   Out: 150   Last three shifts: 10/31 1901 - 11/02 0700  In: -   Out: 4324 [Urine:3500]      Physical Exam:   General:          Patient appears comfortable   EENT:              EOMI. Anicteric sclerae. MMM  Resp:               Decreased air entry bilaterally with appreciable bilateral bibasilar crackles  CV:                  Regular  rhythm,  S1 plus S2, no murmurs rubs or gallop, no edema  GI:                   Soft, Non distended, Non tender.  +Bowel sounds  Neurologic:       Alert and oriented X 3, normal speech,   Psych:   Good insight. Not anxious nor agitated  Skin:                No rashes.   No jaundice      Lab/Data Review:  Recent Results (from the past 24 hour(s))   ECHO ADULT COMPLETE    Collection Time: 11/01/21  5:41 PM   Result Value Ref Range    LV ED Vol A4C 57.00 cm3    LV ED Vol A2C 75.20 cm3    LV ES Vol A4C 22.00 cm3    LV ES Vol A2C 23.90 cm3    IVSd 0.95 (A) 0.60 - 0.90 cm    LVIDd 4.22 3.90 - 5.30 cm    LVIDs 2.88 cm    Left Ventricular Outflow Tract Mean Gradient 1.00 mmHg    LVOT VTI 17.90 cm    LVOT VTI 17.90 cm    LVOT Peak Velocity 85.50 cm/s    LVOT Peak Gradient 3.00 mmHg    LVPWd 0.74 0.60 - 0.90 cm    LV E' Septal Velocity 6.63 cm/s    E/E' lateral 8.20     E/E' septal 9.20     Left Atrium Major Axis 5.67 cm    LA Area 4C 26.80 cm2    LA Volume DISK .00 22.0 - 52.0 cm3    Left Atrium Major Axis 4.10 cm    Left Atrium Major Axis 4.10 cm    LA Vol Index 50.80 (A) 16.00 - 28.00 ml/m2    Aortic Valve Systolic Mean Gradient 7.49 mmHg    AoV VTI 30.00 cm    Aortic valve mean velocity 113.00 cm/s    Aortic Valve Systolic Peak Velocity 173.31 cm/s    AoV PG 12.00 mmHg    Ao Root D 2.90 cm    MR Peak Gradient 36.00 mmHg    Mitral Valve Max Velocity 102.00 cm/s    MV Peak Gradient 4.00 mmHg    Mitral Valve Deceleration [place] 4,650.00 mm/s2    Mitral Valve Deceleration [place] 4,650.00 mm/s2    Mitral Valve E Wave Deceleration Time 229.00 ms    Mitral Valve Pressure Half-time 51.00 ms    MV A Thang 88.20 cm/s    MV E Thang 61.10 cm/s    MV Mean Gradient 1.00 mmHg    Mitral Valve Annulus Velocity Time Integral 23.90 cm    MVA (PHT) 4.31 cm2    MV E/A 0.70     Pulmonic Regurgitant End Max Velocity 137.00 cm/s    Pulmonic Valve Systolic Peak Instantaneous Gradient 8.00 mmHg    Pulmonic Valve Systolic Mean Gradient 7.07 mmHg    Pulmonic Valve Systolic Velocity Time Integral 19.70 cm    Pulmonic Valve Max Velocity 119.00 cm/s    Pulmonic Valve Systolic Peak Instantaneous Gradient 6.00 mmHg    Est. RA Pressure 8.00 mmHg    RVIDd 3.42 cm    RVSP 80.00 mmHg    Tricuspid Valve Max Velocity 423.00 cm/s    Triscuspid Valve Regurgitation Peak Gradient 72.00 mmHg    Tapse 1.00 (A) 1.50 - 2.00 cm    Right Atrial Area 4C 14.67 cm2   GLUCOSE, POC    Collection Time: 11/01/21  8:14 PM   Result Value Ref Range    Glucose (POC) 195 (H) 65 - 117 mg/dL    Performed by Oneil Wyatt (PCT)    GLUCOSE, POC    Collection Time: 11/02/21  8:37 AM   Result Value Ref Range    Glucose (POC) 109 65 - 117 mg/dL    Performed by Laquita Cade, POC    Collection Time: 11/02/21 11:52 AM   Result Value Ref Range    Glucose (POC) 118 (H) 65 - 117 mg/dL    Performed by Stan Valencia          Assessment and plan:      (1) Acute hypoxic respiratory daily: lasix. On room air    (2) Afib with RVR: amio gtt, on metoprolola dnDc amio gtt. (3) SBO: s/t ex-lap. eft sided colostomy s/t cancer suspicion presents with small bowel obstruction S/p POD 3 right colostomy and resection and incisional hernia repair and left stoma hernia    Once on room air, she is cleared from medial stand point, does not need lasix on discharge. We will continue to follow. She can discharged on LASIX 40mg daily and METOPROLOL XL 25 mg daily, F/u with cardiology in 1 weeks    Hold HCTZ and losartan    We will sign off, thank you for allowing to take care of Mrs. Elsy Lopez    Signed By: Lukas Jorgensen MD     November 2, 2021

## 2021-11-02 NOTE — PROGRESS NOTES
Patient is ambulatory and tolerating colostomy well. Converted form a-fib to NSR on 10/31/21. Plan for discharge home this afternoon. Problem: Pressure Injury - Risk of  Goal: *Prevention of pressure injury  Description: Document Miles Scale and appropriate interventions in the flowsheet. Outcome: Progressing Towards Goal  Note: Pressure Injury Interventions:  Sensory Interventions: Minimize linen layers, Float heels    Moisture Interventions: Absorbent underpads, Internal/External urinary devices    Activity Interventions: PT/OT evaluation, Increase time out of bed    Mobility Interventions: PT/OT evaluation, HOB 30 degrees or less    Nutrition Interventions: Offer support with meals,snacks and hydration, Document food/fluid/supplement intake    Friction and Shear Interventions: Minimize layers, Apply protective barrier, creams and emollients                Problem: Patient Education: Go to Patient Education Activity  Goal: Patient/Family Education  Outcome: Progressing Towards Goal     Problem: Falls - Risk of  Goal: *Absence of Falls  Description: Document James Fall Risk and appropriate interventions in the flowsheet.   Outcome: Progressing Towards Goal  Note: Fall Risk Interventions:  Mobility Interventions: Bed/chair exit alarm, Patient to call before getting OOB         Medication Interventions: Bed/chair exit alarm, Patient to call before getting OOB    Elimination Interventions: Bed/chair exit alarm, Call light in reach              Problem: Patient Education: Go to Patient Education Activity  Goal: Patient/Family Education  Outcome: Progressing Towards Goal

## 2021-11-02 NOTE — PROGRESS NOTES
Problem: Falls - Risk of  Goal: *Absence of Falls  Description: Document Justin Talbot Fall Risk and appropriate interventions in the flowsheet.   Outcome: Progressing Towards Goal  Note: Fall Risk Interventions:  Mobility Interventions: Bed/chair exit alarm, Patient to call before getting OOB         Medication Interventions: Bed/chair exit alarm, Patient to call before getting OOB    Elimination Interventions: Bed/chair exit alarm, Call light in reach

## 2021-11-02 NOTE — PROGRESS NOTES
Progress Note      11/2/2021 6:00 AM  NAME: Yvonne Brian   MRN:  [id number]   Admit Diagnosis: Obstruction due to parastomal hernia [K43.3]  S/P exploratory laparotomy [Z98.890]      Problem List:   1.  Status post exploratory laparotomy with extensive lysis of adhesion  2.  History of colon cancer  3.  Chronic pain  4.  Atrial fibrillation with rapid ventricular response, converted to sinus rhythm  5.  Hypertension  6.  Dyslipidemia  7.  Type 2 diabetes  8.  Human immunodeficiency virus infection (HIV)  9.  Tinnitus  10. Hearing impaired     11.  Mild leukocytosis   12.  Anemia  13.  Electrolyte abnormalities (hypokalemia, and hyperchloremia)       Subjective: The patient is seen and examined in room 469. There were no acute cardiovascular events for night. Currently, she denies any cardiovascular complaints. Specifically, she denies any chest pain shortness of breath. Further, there is no awareness of rapid heart rate, palpitations or missed beats. The patient remains in sinus rhythm. Discussed with RN events overnight. Medications Personally Reviewed:    Current Facility-Administered Medications   Medication Dose Route Frequency    metoprolol succinate (TOPROL-XL) XL tablet 25 mg  25 mg Oral DAILY    acetaminophen (TYLENOL) tablet 650 mg  650 mg Oral Q6H PRN    furosemide (LASIX) injection 40 mg  40 mg IntraVENous Q12H    enoxaparin (LOVENOX) injection 40 mg  40 mg SubCUTAneous Q24H    piperacillin-tazobactam (ZOSYN) 3.375 g in 0.9% sodium chloride (MBP/ADV) 100 mL MBP  3.375 g IntraVENous Q8H    ondansetron (ZOFRAN) injection 4 mg  4 mg IntraVENous Q6H PRN           Objective:      Physical Exam:  Essentially unchanged  Last 24hrs VS reviewed since prior progress note.  Most recent are:    Visit Vitals  /64   Pulse 81   Temp 98 °F (36.7 °C)   Resp 22   Ht 5' 5.35\" (1.66 m)   Wt 89.6 kg (197 lb 8.5 oz)   SpO2 98%   BMI 32.52 kg/m²       Intake/Output Summary (Last 24 hours) at 11/2/2021 0600  Last data filed at 11/1/2021 1506  Gross per 24 hour   Intake    Output 1015 ml   Net -1015 ml          Data Review    Telemetry: Sinus rhythm without ventricular ectopy    EKG:   []  No new EKG for review    Lab Data Personally Reviewed:    Recent Labs     11/01/21  0635 10/31/21  1324   WBC 7.7 12.3*   HGB 10.2* 10.7*   HCT 31.4* 32.5*    193     No results for input(s): INR, PTP, APTT, INREXT in the last 72 hours. Recent Labs     11/01/21  0635 10/31/21  1324 10/30/21  0857    142 143   K 3.1* 3.6 3.4*    110* 110*   CO2 29 25 24   BUN 11 9 11   CREA 0.35* 0.37* 0.53*   * 141* 119*   CA 8.8 9.0 9.1   MG 1.6 1.8  --      No results for input(s): CPK, CKNDX, TROIQ in the last 72 hours. No lab exists for component: CPKMB  No results found for: CHOL, CHOLX, CHLST, CHOLV, HDL, HDLP, LDL, LDLC, DLDLP, TGLX, TRIGL, TRIGP, CHHD, CHHDX    No results for input(s): AP, TBIL, TP, ALB, GLOB, GGT, AML, LPSE in the last 72 hours. No lab exists for component: SGOT, GPT, AMYP, HLPSE  No results for input(s): PH, PCO2, PO2 in the last 72 hours. Assessment/Plan:   1. Continue telemetry monitor while hospitalized  2. From a cardiovascular standpoint the patient may be discharged home  3. Continue current cardiovascular medications including furosemide, and metoprolol  4.   The patient is to follow-up in our office within one to 2 weeks after discharge     Arelis Cobb MD Hypotension, unspecified hypotension type

## 2021-11-03 LAB
ECHO AO ROOT DIAM: 2.9 CM
ECHO AV MEAN GRADIENT: 6 MMHG
ECHO AV MEAN VELOCITY: 113 CM/S
ECHO AV PEAK GRADIENT: 12 MMHG
ECHO AV PEAK VELOCITY: 171 CM/S
ECHO AV VTI: 30 CM
ECHO EST RA PRESSURE: 8 MMHG
ECHO LA AREA 4C: 26.8 CM2
ECHO LA MAJOR AXIS: 4.1 CM
ECHO LA MINOR AXIS: 2.07 CM
ECHO LV E' SEPTAL VELOCITY: 6.63 CM/S
ECHO LV EDV A2C: 75.2 CM3
ECHO LV EDV A4C: 57 CM3
ECHO LV EJECTION FRACTION A4C: 61 %
ECHO LV EJECTION FRACTION BIPLANE: 60.1 % (ref 55–100)
ECHO LV ESV A2C: 23.9 CM3
ECHO LV ESV A4C: 22 CM3
ECHO LV INTERNAL DIMENSION DIASTOLIC: 4.22 CM (ref 3.9–5.3)
ECHO LV INTERNAL DIMENSION SYSTOLIC: 2.88 CM
ECHO LV IVSD: 0.95 CM (ref 0.6–0.9)
ECHO LV MASS 2D: 109.8 G (ref 67–162)
ECHO LV MASS INDEX 2D: 55.5 G/M2 (ref 43–95)
ECHO LV POSTERIOR WALL DIASTOLIC: 0.74 CM (ref 0.6–0.9)
ECHO LVOT DIAM: 2 CM
ECHO LVOT PEAK GRADIENT: 3 MMHG
ECHO LVOT PEAK VELOCITY: 85.5 CM/S
ECHO LVOT VTI: 17.9 CM
ECHO MV A VELOCITY: 88.2 CM/S
ECHO MV AREA PHT: 4.31 CM2
ECHO MV E DECELERATION TIME (DT): 229 MS
ECHO MV E VELOCITY: 61.1 CM/S
ECHO MV E/A RATIO: 0.69
ECHO MV E/E' SEPTAL: 9.22
ECHO MV MAX VELOCITY: 102 CM/S
ECHO MV MEAN GRADIENT: 1 MMHG
ECHO MV PEAK GRADIENT: 4 MMHG
ECHO MV PRESSURE HALF TIME (PHT): 51 MS
ECHO MV VTI: 23.9 CM
ECHO PV MAX VELOCITY: 119 CM/S
ECHO PV MEAN GRADIENT: 2 MMHG
ECHO PV PEAK INSTANTANEOUS GRADIENT SYSTOLIC: 6 MMHG
ECHO PV REGURGITANT MAX VELOCITY: 137 CM/S
ECHO PV VTI: 19.7 CM
ECHO RA AREA 4C: 14.67 CM2
ECHO RIGHT VENTRICULAR SYSTOLIC PRESSURE (RVSP): 80 MMHG
ECHO RV INTERNAL DIMENSION: 3.42 CM
ECHO RV TAPSE: 1 CM (ref 1.5–2)
ECHO TV MAX VELOCITY: 423 CM/S
ECHO TV REGURGITANT PEAK GRADIENT: 72 MMHG
GLUCOSE BLD STRIP.AUTO-MCNC: 129 MG/DL (ref 65–117)
GLUCOSE BLD STRIP.AUTO-MCNC: 139 MG/DL (ref 65–117)
GLUCOSE BLD STRIP.AUTO-MCNC: 160 MG/DL (ref 65–117)
LA VOL DISK BP: 100 CM3 (ref 22–52)
LVOT MG: 1 MMHG
MV DEC SLOPE: 4650 MM/S2
MV DEC SLOPE: 4650 MM/S2
PERFORMED BY, TECHID: ABNORMAL

## 2021-11-03 PROCEDURE — 65270000029 HC RM PRIVATE

## 2021-11-03 PROCEDURE — 82962 GLUCOSE BLOOD TEST: CPT

## 2021-11-03 PROCEDURE — 74011250637 HC RX REV CODE- 250/637: Performed by: INTERNAL MEDICINE

## 2021-11-03 PROCEDURE — 74011000258 HC RX REV CODE- 258: Performed by: SURGERY

## 2021-11-03 PROCEDURE — 74011000250 HC RX REV CODE- 250: Performed by: INTERNAL MEDICINE

## 2021-11-03 PROCEDURE — 74011250636 HC RX REV CODE- 250/636: Performed by: SURGERY

## 2021-11-03 PROCEDURE — 74011250636 HC RX REV CODE- 250/636: Performed by: INTERNAL MEDICINE

## 2021-11-03 RX ORDER — POTASSIUM CHLORIDE 20 MEQ/1
40 TABLET, EXTENDED RELEASE ORAL
Status: COMPLETED | OUTPATIENT
Start: 2021-11-03 | End: 2021-11-03

## 2021-11-03 RX ORDER — METOPROLOL SUCCINATE 25 MG/1
50 TABLET, EXTENDED RELEASE ORAL DAILY
Status: DISCONTINUED | OUTPATIENT
Start: 2021-11-03 | End: 2021-11-04 | Stop reason: HOSPADM

## 2021-11-03 RX ORDER — DILTIAZEM HCL/D5W 125 MG/125
5 PLASTIC BAG, INJECTION (ML) INTRAVENOUS CONTINUOUS
Status: DISCONTINUED | OUTPATIENT
Start: 2021-11-03 | End: 2021-11-03

## 2021-11-03 RX ORDER — DILTIAZEM HYDROCHLORIDE 5 MG/ML
10 INJECTION INTRAVENOUS ONCE
Status: COMPLETED | OUTPATIENT
Start: 2021-11-03 | End: 2021-11-03

## 2021-11-03 RX ADMIN — APIXABAN 5 MG: 5 TABLET, FILM COATED ORAL at 13:18

## 2021-11-03 RX ADMIN — DILTIAZEM HYDROCHLORIDE 10 MG: 5 INJECTION INTRAVENOUS at 00:58

## 2021-11-03 RX ADMIN — POTASSIUM CHLORIDE 40 MEQ: 1500 TABLET, EXTENDED RELEASE ORAL at 00:25

## 2021-11-03 RX ADMIN — Medication 5 MG/HR: at 01:00

## 2021-11-03 RX ADMIN — FUROSEMIDE 40 MG: 10 INJECTION INTRAMUSCULAR; INTRAVENOUS at 21:54

## 2021-11-03 RX ADMIN — PIPERACILLIN AND TAZOBACTAM 3.38 G: 3; .375 INJECTION, POWDER, LYOPHILIZED, FOR SOLUTION INTRAVENOUS at 21:54

## 2021-11-03 RX ADMIN — FUROSEMIDE 40 MG: 10 INJECTION INTRAMUSCULAR; INTRAVENOUS at 08:40

## 2021-11-03 RX ADMIN — APIXABAN 5 MG: 5 TABLET, FILM COATED ORAL at 21:54

## 2021-11-03 RX ADMIN — PIPERACILLIN AND TAZOBACTAM 3.38 G: 3; .375 INJECTION, POWDER, LYOPHILIZED, FOR SOLUTION INTRAVENOUS at 13:19

## 2021-11-03 RX ADMIN — METOPROLOL SUCCINATE 50 MG: 25 TABLET, EXTENDED RELEASE ORAL at 08:40

## 2021-11-03 NOTE — PROGRESS NOTES
Tele called patient's HR jumping up in the 180s and 170s.  Dr Destiny Wilkins contacted and orders received to give 10 mg cardizem bolus and start IV cardizem at 5mg/hr, potassium 40 MEq given one time dose as ordered

## 2021-11-03 NOTE — PROGRESS NOTES
POD # 5    Chief Complaint:      Subjective:  Ms. Javier Kennedy is a 80y.o. year old * female S/P Exploratory  Laparotomy, extensive lysis of adhesion resiting of colostomy to right lower quadrant with repair of left parastomal hernia and midline incisional hernia and partial resection of left colon. Back to sinus rhythm & rate controlled. Dr. Jennifer Mims has switched from Amiodarone to Metoprolol. Off nasal O2 and looks very comfortable. Ostomy had some stool and gas this morning. No nausea /vomiting. No fever or chills. No abdominal pain. Has been tolerating soft diet. Review of Systems:   Constitutional:  no fever,  no chills,  no sweats, No weakness, No fatigue, No decreased activity. Respiratory:  shortness of breath, No cough, No sputum production, No hemoptysis, No wheezing, No cyanosis. Cardiovascular: No chest pain, No palpitations, No bradycardia,  tachycardia, No peripheral edema, No syncope. Gastrointestinal: No nausea,  No vomiting, No diarrhea, No constipation, No heartburn, No abdominal pain. Genitourinary: No dysuria, No hematuria, No change in urine stream, No urethral discharge, No lesions. Musculoskeletal: No back pain, No neck pain, No joint pain, No muscle pain, No claudication, No decreased range of motion, No trauma. Integumentary: No rash, No pruritus, No abrasions. Neurologic: Alert and oriented X4, No abnormal balance, No headache, No confusion, No numbness, No tingling. Psychiatric: No anxiety, No depression, No rosales. Physical Exam:     Vitals & Measurements:     Wt Readings from Last 3 Encounters:   10/28/21 89.6 kg (197 lb 8.5 oz)   10/26/21 89.6 kg (197 lb 8.5 oz)   07/06/21 87.5 kg (193 lb)     Temp Readings from Last 3 Encounters:   11/02/21 98.1 °F (36.7 °C)   10/26/21 98.1 °F (36.7 °C)   07/06/21 97.7 °F (36.5 °C) (Temporal)     BP Readings from Last 3 Encounters:   11/02/21 117/62   10/26/21 (!) 158/71   07/06/21 126/80     Pulse Readings from Last 3 Encounters:   11/02/21 89   10/26/21 67   07/06/21 63      Ht Readings from Last 3 Encounters:   10/28/21 5' 5.35\" (1.66 m)   10/26/21 5' 5.35\" (1.66 m)   07/06/21 5' 7\" (1.702 m)      Date 11/01/21 1900 - 11/02/21 0659 11/02/21 0700 - 11/03/21 0659   Shift 9299-5866 24 Hour Total 9971-0754 7012-2989 24 Hour Total   INTAKE   Shift Total(mL/kg)        OUTPUT   Urine(mL/kg/hr) 1000 2000        Urine Voided 1000 2000      Stool  15 150  150     Output (ml) (Colostomy Right ;Mid Abdomen)  15 150  150   Shift Total(mL/kg) 1000(11.2) 2015(22.5) 150(1.7)  150(1.7)   NET -1000 -2015 -150  -150   Weight (kg) 89.6 89.6 89.6 89.6 89.6       General: well appearing, no acute distress, looks very comfortable. Respiratory: normal chest wall expansion, CTA B, no r/r/w, no rubs, tachypneic  Cardiovascular: Irregularly irregular, no m/r/g, Normal S1 and S2  Abdomen: Soft, non tender, non-distended, normal bowel sounds in all quadrants, no hepatosplenomegaly, no tympany. Incision scar: Skin intact. Stoma viable. Gas and stool in the bag.     Genitourinary: No inguinal hernia, normal external gentalia, no renal angle tenderness  Musculoskeletal: normal ROM in upper and lower extremities  Integumentary: warm, dry, and pink, with no rash, purpura, or petechia  Neurological:Cranial Nerves II-XII grossly intact, No sensory or motor deficit  Psychiatric: Cooperative with normal mood, affect, and cognition    Laboratory Values:   Recent Results (from the past 24 hour(s))   GLUCOSE, POC    Collection Time: 11/02/21  8:37 AM   Result Value Ref Range    Glucose (POC) 109 65 - 117 mg/dL    Performed by Karine Sutherland    ECHO ADULT COMPLETE    Collection Time: 11/02/21  9:46 AM   Result Value Ref Range    LV ED Vol A4C 57.00 cm3    LV ED Vol A2C 75.20 cm3    LV ES Vol A4C 22.00 cm3    LV ES Vol A2C 23.90 cm3    IVSd 0.95 (A) 0.60 - 0.90 cm    LVIDd 4.22 3.90 - 5.30 cm    LVIDs 2.88 cm    Left Ventricular Outflow Tract Mean Gradient 1.00 mmHg LVOT VTI 17.90 cm    LVOT Peak Velocity 85.50 cm/s    LVOT Peak Gradient 3.00 mmHg    LVPWd 0.74 0.60 - 0.90 cm    LV E' Septal Velocity 6.63 cm/s    E/E' septal 9.22     Left Atrium Major Axis 4.10 cm    LA Area 4C 26.80 cm2    LA Volume DISK .00 22.0 - 52.0 cm3    Aortic Valve Systolic Mean Gradient 5.36 mmHg    AoV VTI 30.00 cm    Aortic valve mean velocity 113.00 cm/s    Aortic Valve Systolic Peak Velocity 340.90 cm/s    AoV PG 12.00 mmHg    Ao Root D 2.90 cm    Mitral Valve Max Velocity 102.00 cm/s    MV Peak Gradient 4.00 mmHg    Mitral Valve Deceleration [place] 4,650.00 mm/s2    Mitral Valve Deceleration [place] 4,650.00 mm/s2    Mitral Valve E Wave Deceleration Time 229.00 ms    Mitral Valve Pressure Half-time 51.00 ms    MV A Thang 88.20 cm/s    MV E Thang 61.10 cm/s    MV Mean Gradient 1.00 mmHg    Mitral Valve Annulus Velocity Time Integral 23.90 cm    MVA (PHT) 4.31 cm2    MV E/A 0.69     Pulmonic Regurgitant End Max Velocity 137.00 cm/s    Pulmonic Valve Systolic Peak Instantaneous Gradient 6.00 mmHg    Pulmonic Valve Systolic Mean Gradient 3.91 mmHg    Pulmonic Valve Systolic Velocity Time Integral 19.70 cm    Pulmonic Valve Max Velocity 119.00 cm/s    Est. RA Pressure 8.00 mmHg    RVIDd 3.42 cm    RVSP 80.00 mmHg    Tricuspid Valve Max Velocity 423.00 cm/s    Triscuspid Valve Regurgitation Peak Gradient 72.00 mmHg    Tapse 1.00 (A) 1.50 - 2.00 cm    Right Atrial Area 4C 14.67 cm2    LVOT d 2.00 cm    BP EF 60.1 55.0 - 100.0 %    LV Ejection Fraction MOD 4C 61.0 %    LV Mass .8 67.0 - 162.0 g    LV Mass AL Index 55.5 43.0 - 95.0 g/m2    Left Atrium Minor Axis 2.07 cm   GLUCOSE, POC    Collection Time: 11/02/21 11:52 AM   Result Value Ref Range    Glucose (POC) 118 (H) 65 - 117 mg/dL    Performed by Laquita Cade, POC    Collection Time: 11/02/21  3:57 PM   Result Value Ref Range    Glucose (POC) 122 (H) 65 - 117 mg/dL    Performed by RJ CHAVES    GLUCOSE, POC    Collection Time: 11/02/21  8:15 PM   Result Value Ref Range    Glucose (POC) 129 (H) 65 - 117 mg/dL    Performed by Aurea Porter          Radiology:   XR CHEST PORT   Final Result   Underexpanded lungs. Bilateral lower lobe airspace disease and   volume loss. I cannot exclude superimposed pneumonia. Small bilateral pleural   effusions. No pneumothorax. XR ABD (KUB)   Final Result   Findings/impression:      Gaseous prominence of bowel throughout the abdomen, suggesting ileus or   low-grade obstruction. No supine evidence of pneumoperitoneum. Skin staples noted in the right lower quadrant/pelvis. Right pleural effusion noted. No acute osseous abnormality identified. Assessment:  Problem List Items Addressed This Visit     None       Large Incarcerated Parastomal hernia with incisional hernia  S/P Exploratory  Laparotomy, extensive lysis of adhesion resiting of colostomy to right lower quadrant with repair of left parastomal hernia and midline incisional hernia and partial resection of left colon, POD #5        Plan:    1. Admission  2. Diet:Soft diet  3. Heplock IV fluid. 4. SCD  5. IS  6. Pain medications  7. Antibiotics: Zosyn  8. Nausea medication  9. Place purewick. 10. Continue Lasix 20 mg IV. 11. To chair and ambulate. 12. Labs & Radiology: In the a.m.  13. Appreciate Cardiology Consultation & Hospitalist consultation for medical management. 14. Transthoracic echocardiogram cancelled by Cardiologist  15. Continue Lovenox 40 mg sq. 16. Advance diet  17. D/C Home tomorrow. 18. Plan discussed with patient and family and answered all their questions. Thank you for allowing me to participate in the care of this patient.

## 2021-11-03 NOTE — DISCHARGE SUMMARY
DISCHARGE SUMMARY    Subjective:       HISTORY OF PRESENT ILLNESS:     Ms. Jessica Kan developed left side incarcerated parastomal hernia and incisional hernia. She was symptomatic. She underwent an planned procedure for repair of incisional hernia as well as left-sided parastomal hernia. The procedure was performed on October 28, 2021. Past Medical History:   Diagnosis Date    Cancer [facility])     cancer of colon    Chronic pain     [facility]     Hearing loss 6/15/2020    HIV [facility] immunodeficiency virus infection) ([facility]     Hypercholesterolemia     Hypertension     Tinnitus 6/15/2020      Past Surgical History:   Procedure Laterality Date    HX COLONOSCOPY      HX GI      Colon surgery colostomy    HX GYN      hysterectomy     No Known Allergies   Meds:  See below  Social History     Tobacco Use    Smoking status: Former Smoker    Smokeless tobacco: Never Used    Tobacco comment: pt states quit more than 20 years ago   Substance Use Topics    Alcohol use: Never      Family History   Problem Relation Age of Onset    Diabetes Mother     Cancer Father        REVIEW OF SYSTEMS:    As above, otherwise noncontributory. Objective:      Physical Exam:    Last 24hrs VS reviewed since prior progress note. Most recent are:    Visit Vitals  /62 (BP 1 Location: Right upper arm, BP Patient Position: Semi fowlers)   Pulse 89   Temp 98.1 °F (36.7 °C)   Resp 20   Ht 5' 5.35\" (1.66 m)   Wt 89.6 kg (197 lb 8.5 oz)   SpO2 99%   BMI 32.52 kg/m²       Intake/Output Summary (Last 24 hours) at 11/2/2021 2105  Last data filed at 11/2/2021 1119  Gross per 24 hour   Intake    Output 650 ml   Net -650 ml        General Appearance: Well developed, in no acute respiratory distress. Ears/Nose/Mouth/Throat: Pupils equal and round, Hearing grossly normal.  Neck: Supple. JVP within normal limits. Carotids good upstrokes, with no bruit. Chest: Lungs clear to auscultation bilaterally.   Cardiovascular: JVP is not elevated, PMI is not attempted, normal intensity S1 and S2, without S3. Abdomen: Soft, non-tender, bowel sounds are active. No organomegaly. Extremities: No edema bilaterally. Skin: Warm and dry. Neuro: CN II-XII grossly intact, Strength and sensation grossly intact. Data:      Telemetry:    EKG:  []  No new EKG for review  XR CHEST PORT   Final Result   Underexpanded lungs. Bilateral lower lobe airspace disease and   volume loss. I cannot exclude superimposed pneumonia. Small bilateral pleural   effusions. No pneumothorax. XR ABD (KUB)   Final Result   Findings/impression:      Gaseous prominence of bowel throughout the abdomen, suggesting ileus or   low-grade obstruction. No supine evidence of pneumoperitoneum. Skin staples noted in the right lower quadrant/pelvis. Right pleural effusion noted. No acute osseous abnormality identified. Prior to Admission medications    Medication Sig Start Date End Date Taking? Authorizing Provider   furosemide (LASIX) 20 mg tablet Take 2 Tablets by mouth daily. 11/2/21  Yes Bo Mccann MD   metoprolol succinate (TOPROL-XL) 25 mg XL tablet Take 1 Tablet by mouth daily. 11/2/21  Yes Bo Mccann MD   acetaminophen (Tylenol Extra Strength) 500 mg tablet Take 500 mg by mouth daily. Yes Provider, Historical   amLODIPine (NORVASC) 5 mg tablet Take 5 mg by mouth daily. 3/8/21  Yes Provider, Historical   losartan (COZAAR) 100 mg tablet Take 50 mg by mouth two (2) times a day. Yes Provider, Historical   atenoloL (TENORMIN) 50 mg tablet Take 50 mg by mouth daily. Yes Provider, Historical   atorvastatin (LIPITOR) 20 mg tablet Take 20 mg by mouth daily. Yes Provider, Historical   losartan-hydroCHLOROthiazide (HYZAAR) 100-25 mg per tablet Take 1 Tab by mouth daily. Yes Provider, Historical   metFORMIN (GLUCOPHAGE) 500 mg tablet Take 500 mg by mouth daily (with breakfast).    Yes Provider, Historical   folic acid/multivit-min/lutein (CENTRUM SILVER PO) Take 1 Tablet by mouth daily. Provider, Historical   Biktarvy tab tablet  3/10/21   Provider, Historical   hydroCHLOROthiazide (HYDRODIURIL) 25 mg tablet Take 25 mg by mouth daily. 3/1/21   Provider, Historical   wkdsajmmg-dtbgvlar-cgcvkvg ala (Biktarvy) tab tablet Take 1 Tab by mouth daily. Patient not taking: Reported on 10/26/2021 4/6/21   Toni Clay MD   acetaminophen-codeine (TYLENOL #3) 300-30 mg per tablet Take 1 Tab by mouth every four (4) hours as needed for Pain. Patient not taking: Reported on 10/26/2021    Provider, Historical   PEG 3350-Electrolytes (Gavilyte-C) 240-22.72-6.72 -5.84 gram solution Take 4 L by mouth now. Patient not taking: Reported on 10/26/2021    Provider, Historical   HYDROcodone-acetaminophen (NORCO) 5-325 mg per tablet Take  by mouth. Patient not taking: Reported on 10/26/2021    Provider, Historical   sAXagliptin (Onglyza) 5 mg tab tablet Take 5 mg by mouth daily. Provider, Historical   oxybutynin chloride XL (DITROPAN XL) 5 mg CR tablet Take 5 mg by mouth daily. Patient not taking: Reported on 10/26/2021    Provider, Historical   trimethoprim-sulfamethoxazole (BACTRIM DS, SEPTRA DS) 160-800 mg per tablet Take 1 Tab by mouth two (2) times a day. Patient not taking: Reported on 10/26/2021    Provider, Historical   efavirenz (Sustiva) 600 mg tablet Take 600 mg by mouth nightly. Patient not taking: Reported on 10/26/2021    Provider, Historical   emtricitabine-tenofovir, TDF, (Truvada) 200-300 mg per tablet Take  by mouth daily.   Patient not taking: Reported on 10/26/2021    Provider, Historical       Recent Results (from the past 24 hour(s))   GLUCOSE, POC    Collection Time: 11/02/21  8:37 AM   Result Value Ref Range    Glucose (POC) 109 65 - 117 mg/dL    Performed by Yeimi HESTER ADULT COMPLETE    Collection Time: 11/02/21  9:46 AM   Result Value Ref Range    LV ED Vol A4C 57.00 cm3    LV ED Vol A2C 75.20 cm3    LV ES Vol A4C 22.00 cm3    LV ES Vol A2C 23.90 cm3    IVSd 0.95 (A) 0.60 - 0.90 cm    LVIDd 4.22 3.90 - 5.30 cm    LVIDs 2.88 cm    Left Ventricular Outflow Tract Mean Gradient 1.00 mmHg    LVOT VTI 17.90 cm    LVOT Peak Velocity 85.50 cm/s    LVOT Peak Gradient 3.00 mmHg    LVPWd 0.74 0.60 - 0.90 cm    LV E' Septal Velocity 6.63 cm/s    E/E' septal 9.22     Left Atrium Major Axis 4.10 cm    LA Area 4C 26.80 cm2    LA Volume DISK .00 22.0 - 52.0 cm3    Aortic Valve Systolic Mean Gradient 2.51 mmHg    AoV VTI 30.00 cm    Aortic valve mean velocity 113.00 cm/s    Aortic Valve Systolic Peak Velocity 989.35 cm/s    AoV PG 12.00 mmHg    Ao Root D 2.90 cm    Mitral Valve Max Velocity 102.00 cm/s    MV Peak Gradient 4.00 mmHg    Mitral Valve Deceleration [place] 4,650.00 mm/s2    Mitral Valve Deceleration [place] 4,650.00 mm/s2    Mitral Valve E Wave Deceleration Time 229.00 ms    Mitral Valve Pressure Half-time 51.00 ms    MV A Thang 88.20 cm/s    MV E Thang 61.10 cm/s    MV Mean Gradient 1.00 mmHg    Mitral Valve Annulus Velocity Time Integral 23.90 cm    MVA (PHT) 4.31 cm2    MV E/A 0.69     Pulmonic Regurgitant End Max Velocity 137.00 cm/s    Pulmonic Valve Systolic Peak Instantaneous Gradient 6.00 mmHg    Pulmonic Valve Systolic Mean Gradient 7.24 mmHg    Pulmonic Valve Systolic Velocity Time Integral 19.70 cm    Pulmonic Valve Max Velocity 119.00 cm/s    Est. RA Pressure 8.00 mmHg    RVIDd 3.42 cm    RVSP 80.00 mmHg    Tricuspid Valve Max Velocity 423.00 cm/s    Triscuspid Valve Regurgitation Peak Gradient 72.00 mmHg    Tapse 1.00 (A) 1.50 - 2.00 cm    Right Atrial Area 4C 14.67 cm2    LVOT d 2.00 cm    BP EF 60.1 55.0 - 100.0 %    LV Ejection Fraction MOD 4C 61.0 %    LV Mass .8 67.0 - 162.0 g    LV Mass AL Index 55.5 43.0 - 95.0 g/m2    Left Atrium Minor Axis 2.07 cm   GLUCOSE, POC    Collection Time: 11/02/21 11:52 AM   Result Value Ref Range    Glucose (POC) 118 (H) 65 - 117 mg/dL    Performed by Madiha Blount    GLUCOSE, POC    Collection Time: 11/02/21  3:57 PM   Result Value Ref Range    Glucose (POC) 122 (H) 65 - 117 mg/dL    Performed by Berenice 30, POC    Collection Time: 11/02/21  8:15 PM   Result Value Ref Range    Glucose (POC) 129 (H) 65 - 117 mg/dL    Performed by [facility] course:  She underwent an expiratory laparotomy with extensive lysis of adhesion repair of left side parastomal hernia and incisional hernia, with resiting of stoma to right lower quadrant. as well as partial resection of colon on October 28, 2021. Postoperatively she was doing fine. However on 30th she developed atrial fibrillation with rapid ventricular rate associated some shortness of breath. A cardiology consult and a medical consult was placed. She was transferred to telemetry floor. She was initially started on Cardizem infusion which failed to control the rate. So she was switched to amiodarone. This quickly brought back her rhythm to sinus rhythm. She was also given some Lasix to reduce the fluid overload. Her diet was slowly advanced from clear liquids to full liquids and soft diet. She tolerated diet. Her symptoms improved. She was discharged home on November 3, 2021. Recommendation is to follow-up with me in 1 week and with Dr. Angela Sheppard in 2 weeks. She will continue her home medications including Lasix 40 mg daily. Next    Thank you very much.        Kathleen Huddleston MD

## 2021-11-03 NOTE — PROGRESS NOTES
Problem: Falls - Risk of  Goal: *Absence of Falls  Description: Document Earma Karen Fall Risk and appropriate interventions in the flowsheet.   Outcome: Progressing Towards Goal  Note: Fall Risk Interventions:  Mobility Interventions: Patient to call before getting OOB         Medication Interventions: Bed/chair exit alarm, Teach patient to arise slowly, Patient to call before getting OOB    Elimination Interventions: Call light in reach, Bed/chair exit alarm

## 2021-11-03 NOTE — PROGRESS NOTES
Last night she had another episode of Atrial fibrillation and was treated by cardiologist.   Cardiologist wanted to observe her another day to make sure that she did not go back in to Atrial fibrillation. Hence her discharge has been with held pending cardiology clearance.

## 2021-11-03 NOTE — PROGRESS NOTES
POD # 6    Chief Complaint:      Subjective:  Ms. Rosie Moon is a 80y.o. year old * female S/P Exploratory  Laparotomy, extensive lysis of adhesion resiting of colostomy to right lower quadrant with repair of left parastomal hernia and midline incisional hernia and partial resection of left colon. Last night she had another episode of Atrial fibrillation which reverted back to sinus after starting Amiodarone. Currently on sinus rhythm & rate controlled. Dr. Lashon Awan has switched from Amiodarone to Cardizem PO. & cleared for discharge. Off nasal O2 and looks very comfortable. Ostomy had some stool and gas this morning. No nausea /vomiting. No fever or chills. No abdominal pain. Has been tolerating soft diet. Review of Systems:   Constitutional:  no fever,  no chills,  no sweats, No weakness, No fatigue, No decreased activity. Respiratory:  shortness of breath, No cough, No sputum production, No hemoptysis, No wheezing, No cyanosis. Cardiovascular: No chest pain, No palpitations, No bradycardia,  tachycardia, No peripheral edema, No syncope. Gastrointestinal: No nausea,  No vomiting, No diarrhea, No constipation, No heartburn, No abdominal pain. Genitourinary: No dysuria, No hematuria, No change in urine stream, No urethral discharge, No lesions. Musculoskeletal: No back pain, No neck pain, No joint pain, No muscle pain, No claudication, No decreased range of motion, No trauma. Integumentary: No rash, No pruritus, No abrasions. Neurologic: Alert and oriented X4, No abnormal balance, No headache, No confusion, No numbness, No tingling. Psychiatric: No anxiety, No depression, No rosales. Physical Exam:     Vitals & Measurements:     Wt Readings from Last 3 Encounters:   10/28/21 89.6 kg (197 lb 8.5 oz)   10/26/21 89.6 kg (197 lb 8.5 oz)   07/06/21 87.5 kg (193 lb)     Temp Readings from Last 3 Encounters:   11/03/21 98.5 °F (36.9 °C)   10/26/21 98.1 °F (36.7 °C)   07/06/21 97.7 °F (36.5 °C) (Temporal)     BP Readings from Last 3 Encounters:   11/03/21 131/71   10/26/21 (!) 158/71   07/06/21 126/80     Pulse Readings from Last 3 Encounters:   11/03/21 96   10/26/21 67   07/06/21 63      Ht Readings from Last 3 Encounters:   10/28/21 5' 5.35\" (1.66 m)   10/26/21 5' 5.35\" (1.66 m)   07/06/21 5' 7\" (1.702 m)      Date 11/02/21 0700 - 11/03/21 0659 11/03/21 0700 - 11/04/21 0659   Shift 5253-7654 3633-6754 24 Hour Total 8032-7355 3311-0118 24 Hour Total   INTAKE   Shift Total(mL/kg)         OUTPUT   Urine(mL/kg/hr)    550  550     Urine Voided    550  550   Stool 150  150 100  100     Output (ml) (Colostomy Right ;Mid Abdomen) 150  150 100  100   Shift Total(mL/kg) 150(1.7)  150(1.7) 650(7.3)  650(7.3)   NET -150  -150 -650  -650   Weight (kg) 89.6 89.6 89.6 89.6 89.6 89.6       General: well appearing, no acute distress, looks very comfortable. Respiratory: normal chest wall expansion, CTA B, no r/r/w, no rubs, tachypneic  Cardiovascular: Irregularly irregular, no m/r/g, Normal S1 and S2  Abdomen: Soft, non tender, non-distended, normal bowel sounds in all quadrants, no hepatosplenomegaly, no tympany. Incision scar: Skin intact. Stoma viable. Gas and stool in the bag.     Genitourinary: No inguinal hernia, normal external gentalia, no renal angle tenderness  Musculoskeletal: normal ROM in upper and lower extremities  Integumentary: warm, dry, and pink, with no rash, purpura, or petechia  Neurological:Cranial Nerves II-XII grossly intact, No sensory or motor deficit  Psychiatric: Cooperative with normal mood, affect, and cognition    Laboratory Values:   Recent Results (from the past 24 hour(s))   GLUCOSE, POC    Collection Time: 11/02/21  3:57 PM   Result Value Ref Range    Glucose (POC) 122 (H) 65 - 117 mg/dL    Performed by Berenice Mosqueda, POC    Collection Time: 11/02/21  8:15 PM   Result Value Ref Range    Glucose (POC) 129 (H) 65 - 117 mg/dL    Performed by Evelin Acosta    GLUCOSE, POC Collection Time: 11/03/21  8:26 AM   Result Value Ref Range    Glucose (POC) 129 (H) 65 - 117 mg/dL    Performed by [address], POC    Collection Time: 11/03/21 10:54 AM   Result Value Ref Range    Glucose (POC) 139 (H) 65 - 117 mg/dL    Performed by Abdulaziz Beckham          Radiology:   XR CHEST PORT   Final Result   Underexpanded lungs. Bilateral lower lobe airspace disease and   volume loss. I cannot exclude superimposed pneumonia. Small bilateral pleural   effusions. No pneumothorax. XR ABD (KUB)   Final Result   Findings/impression:      Gaseous prominence of bowel throughout the abdomen, suggesting ileus or   low-grade obstruction. No supine evidence of pneumoperitoneum. Skin staples noted in the right lower quadrant/pelvis. Right pleural effusion noted. No acute osseous abnormality identified. Assessment:  Problem List Items Addressed This Visit     None       Large Incarcerated Parastomal hernia with incisional hernia  S/P Exploratory  Laparotomy, extensive lysis of adhesion resiting of colostomy to right lower quadrant with repair of left parastomal hernia and midline incisional hernia and partial resection of left colon, POD #6        Plan:    1. Admission  2. Diet:Soft diet  3. Heplock IV fluid. 4. SCD  5. IS  6. Pain medications  7. Antibiotics: Zosyn  8. Nausea medication  9. Place purewick. 10. Continue Lasix 20 mg IV. 11. To chair and ambulate. 12. Labs & Radiology: In the a.m.  13. Appreciate Cardiology Consultation & Hospitalist consultation for medical management. 14. Transthoracic echocardiogram cancelled by Cardiologist  15. Continue Lovenox 40 mg sq. 16. Advance diet  17. D/C Home today. 18. Plan discussed with patient and family and answered all their questions. Thank you for allowing me to participate in the care of this patient.

## 2021-11-03 NOTE — PROGRESS NOTES
Patient converted to a flutter overnight and was started on a cardizem drip. She converted back to sinus rhythm overnight. The drip was discontinued this morning at she is taking PO cardizem. HR in the 90's. Problem: Pressure Injury - Risk of  Goal: *Prevention of pressure injury  Description: Document Miles Scale and appropriate interventions in the flowsheet. Outcome: Progressing Towards Goal  Note: Pressure Injury Interventions:  Sensory Interventions: Keep linens dry and wrinkle-free, Minimize linen layers    Moisture Interventions: Internal/External urinary devices, Minimize layers    Activity Interventions: PT/OT evaluation, Increase time out of bed    Mobility Interventions: PT/OT evaluation, HOB 30 degrees or less    Nutrition Interventions: Offer support with meals,snacks and hydration, Document food/fluid/supplement intake    Friction and Shear Interventions: Minimize layers                Problem: Patient Education: Go to Patient Education Activity  Goal: Patient/Family Education  Outcome: Progressing Towards Goal     Problem: Falls - Risk of  Goal: *Absence of Falls  Description: Document James Fall Risk and appropriate interventions in the flowsheet.   Outcome: Progressing Towards Goal  Note: Fall Risk Interventions:  Mobility Interventions: Patient to call before getting OOB         Medication Interventions: Bed/chair exit alarm, Teach patient to arise slowly, Patient to call before getting OOB    Elimination Interventions: Call light in reach, Bed/chair exit alarm              Problem: Patient Education: Go to Patient Education Activity  Goal: Patient/Family Education  Outcome: Progressing Towards Goal

## 2021-11-03 NOTE — PROGRESS NOTES
Progress Note      11/3/2021 6:47 AM  NAME: Javier Kennedy   MRN:  [id number]   Admit Diagnosis: Obstruction due to parastomal hernia [K43.3]  S/P exploratory laparotomy [Z98.890]      Problem List:   1.  Status post exploratory laparotomy with extensive lysis of adhesion  2.  History of colon cancer  3.  Chronic pain  4.  Atrial fibrillation with rapid ventricular response, converted to sinus rhythm  5.  Hypertension  6.  Dyslipidemia  7.  Type 2 diabetes  8.  Human immunodeficiency virus infection (HIV)  9.  Tinnitus  10. Hearing impaired     11.  Mild leukocytosis   12.  Anemia  13.  Electrolyte abnormalities (hypokalemia, and hyperchloremia)       Subjective: The patient is seen and examined in room 469. Overnight, the patient went into atrial flutter with rapid response. She is treated with IV diltiazem. Within 1 hours she converted back to normal sinus rhythm. Currently, she denies any cardiovascular complaints. Specifically, she denies chest pain, or shortness of breath. There is no awareness of rapid heart rate, palpitations or missed beats. Discussed with RN events overnight. Medications Personally Reviewed:    Current Facility-Administered Medications   Medication Dose Route Frequency    dilTIAZem (CARDIZEM) 125 mg/125 mL (1 mg/mL) dextrose 5% infusion  5 mg/hr IntraVENous CONTINUOUS    metoprolol succinate (TOPROL-XL) XL tablet 25 mg  25 mg Oral DAILY    acetaminophen (TYLENOL) tablet 650 mg  650 mg Oral Q6H PRN    furosemide (LASIX) injection 40 mg  40 mg IntraVENous Q12H    enoxaparin (LOVENOX) injection 40 mg  40 mg SubCUTAneous Q24H    piperacillin-tazobactam (ZOSYN) 3.375 g in 0.9% sodium chloride (MBP/ADV) 100 mL MBP  3.375 g IntraVENous Q8H    ondansetron (ZOFRAN) injection 4 mg  4 mg IntraVENous Q6H PRN           Objective:      Physical Exam:  Essentially unchanged  Last 24hrs VS reviewed since prior progress note.  Most recent are:    Visit Vitals  /72   Pulse 95   Temp 98.7 °F (37.1 °C)   Resp 20   Ht 5' 5.35\" (1.66 m)   Wt 89.6 kg (197 lb 8.5 oz)   SpO2 99%   BMI 32.52 kg/m²       Intake/Output Summary (Last 24 hours) at 11/3/2021 0647  Last data filed at 11/2/2021 1119  Gross per 24 hour   Intake    Output 150 ml   Net -150 ml        Data Review    Telemetry: Sinus rhythm without ventricular ectopy    EKG:   []  No new EKG for review    Lab Data Personally Reviewed:    Recent Labs     11/01/21  0635 10/31/21  1324   WBC 7.7 12.3*   HGB 10.2* 10.7*   HCT 31.4* 32.5*    193     No results for input(s): INR, PTP, APTT, INREXT in the last 72 hours. Recent Labs     11/01/21  0635 10/31/21  1324    142   K 3.1* 3.6    110*   CO2 29 25   BUN 11 9   CREA 0.35* 0.37*   * 141*   CA 8.8 9.0   MG 1.6 1.8     No results for input(s): CPK, CKNDX, TROIQ in the last 72 hours. No lab exists for component: CPKMB  No results found for: CHOL, CHOLX, CHLST, CHOLV, HDL, HDLP, LDL, LDLC, DLDLP, TGLX, TRIGL, TRIGP, CHHD, CHHDX    No results for input(s): AP, TBIL, TP, ALB, GLOB, GGT, AML, LPSE in the last 72 hours. No lab exists for component: SGOT, GPT, AMYP, HLPSE  No results for input(s): PH, PCO2, PO2 in the last 72 hours. Assessment/Plan:   1. Continue telemetry monitoring  2. Continue to monitor serum electrolytes, renal function  3. Continue current cardiovascular medications including , furosemide, and metoprolol  4.   Convert IV diltiazem to by mouth diltiazem     Virginia Ruby MD

## 2021-11-03 NOTE — PROGRESS NOTES
Spiritual Care Assessment/Progress Note  [facility]      NAME: Yvonne Brian      MRN: [id number]  AGE: 80 y.o. SEX: female  Confucianist Affiliation: Lutheran   Language: English     11/3/2021     Total Time (in minutes): 30     Spiritual Assessment begun in [address] through conversation with:         [x]Patient        [x] Family    [] Friend(s)        Reason for Consult: Initial/Spiritual assessment, patient floor     Spiritual beliefs: (Please include comment if needed)     [x] Identifies with a yvette tradition:         [] Supported by a yvette community:            [] Claims no spiritual orientation:           [] Seeking spiritual identity:                [] Adheres to an individual form of spirituality:           [] Not able to assess:                           Identified resources for coping:      [x] Prayer                               [] Music                  [x] Guided Imagery     [x] Family/friends                 [] Pet visits     [] Devotional reading                         [] Unknown     [] Other:                                               Interventions offered during this visit: (See comments for more details)    Patient Interventions: Affirmation of emotions/emotional suffering, Affirmation of yvette, Catharsis/review of pertinent events in supportive environment, Coping skills reviewed/reinforced, Guidance concerning next steps/process to be expected, Iconic (affirming the presence of God/Higher Power), Initial/Spiritual assessment, patient floor, Prayer (assurance of)     Family/Friend(s):  Affirmation of yvette, Catharsis/review of pertinent events in supportive environment, Coping skills reviewed/reinforced, Initial Assessment, Prayer (assurance of)     Plan of Care:     [] Support spiritual and/or cultural needs    [] Support AMD and/or advance care planning process      [] Support grieving process   [] Coordinate Rites and/or Rituals    [] Coordination with community clergy   [] No spiritual needs identified at this time   [] Detailed Plan of Care below (See Comments)  [] Make referral to Music Therapy  [] Make referral to Pet Therapy     [] Make referral to Addiction services  [] Make referral to [facility]  [] Make referral to Spiritual Care Partner  [] No future visits requested        [x] Follow up upon further referrals     Comments: The purpose of the visit was to do a spiritual assessment on the patient. The patient was being visited by her daughter, however she welcomed the visit. She mentioned being cared for and loved by her daughter who she lives with. She shared that she has been well taken care of that she appreciates how genuine the staff has been. She expressed feeling hopefull towards going home today. 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 She shared that she is strengthened by her yvette in God and that she values her relationship with God. The  listened empathically and reflectively as the patient shared. The  provided the ministry of spiritual care the comfort. At the patient's request the  provided the assurance of prayer. [address] [facility] Eitan Larson.    can be reached by calling the  at [facility]  [phone]

## 2021-11-04 VITALS
RESPIRATION RATE: 18 BRPM | HEIGHT: 65 IN | SYSTOLIC BLOOD PRESSURE: 111 MMHG | DIASTOLIC BLOOD PRESSURE: 60 MMHG | HEART RATE: 85 BPM | OXYGEN SATURATION: 99 % | BODY MASS INDEX: 32.91 KG/M2 | TEMPERATURE: 98.4 F | WEIGHT: 197.53 LBS

## 2021-11-04 LAB
ATRIAL RATE: 312 BPM
CALCULATED R AXIS, ECG10: 174 DEGREES
CALCULATED T AXIS, ECG11: -53 DEGREES
DIAGNOSIS, 93000: NORMAL
GLUCOSE BLD STRIP.AUTO-MCNC: 149 MG/DL (ref 65–117)
PERFORMED BY, TECHID: ABNORMAL
Q-T INTERVAL, ECG07: 280 MS
QRS DURATION, ECG06: 82 MS
QTC CALCULATION (BEZET), ECG08: 456 MS
VENTRICULAR RATE, ECG03: 160 BPM

## 2021-11-04 PROCEDURE — 74011000258 HC RX REV CODE- 258: Performed by: SURGERY

## 2021-11-04 PROCEDURE — 82962 GLUCOSE BLOOD TEST: CPT

## 2021-11-04 PROCEDURE — 74011250636 HC RX REV CODE- 250/636: Performed by: SURGERY

## 2021-11-04 PROCEDURE — 74011250637 HC RX REV CODE- 250/637: Performed by: INTERNAL MEDICINE

## 2021-11-04 RX ADMIN — DRONEDARONE 400 MG: 400 TABLET, FILM COATED ORAL at 08:11

## 2021-11-04 RX ADMIN — PIPERACILLIN AND TAZOBACTAM 3.38 G: 3; .375 INJECTION, POWDER, LYOPHILIZED, FOR SOLUTION INTRAVENOUS at 06:43

## 2021-11-04 RX ADMIN — METOPROLOL SUCCINATE 50 MG: 25 TABLET, EXTENDED RELEASE ORAL at 08:11

## 2021-11-04 RX ADMIN — APIXABAN 5 MG: 5 TABLET, FILM COATED ORAL at 08:11

## 2021-11-04 NOTE — PROGRESS NOTES
Problem: Pressure Injury - Risk of  Goal: *Prevention of pressure injury  Description: Document Miles Scale and appropriate interventions in the flowsheet. Outcome: Progressing Towards Goal  Note: Pressure Injury Interventions:  Sensory Interventions: Keep linens dry and wrinkle-free, Maintain/enhance activity level, Minimize linen layers, Assess changes in LOC    Moisture Interventions: Absorbent underpads, Internal/External urinary devices, Minimize layers    Activity Interventions: Increase time out of bed, PT/OT evaluation    Mobility Interventions: HOB 30 degrees or less, PT/OT evaluation    Nutrition Interventions: Offer support with meals,snacks and hydration    Friction and Shear Interventions: HOB 30 degrees or less, Minimize layers                Problem: Patient Education: Go to Patient Education Activity  Goal: Patient/Family Education  Outcome: Progressing Towards Goal     Problem: Falls - Risk of  Goal: *Absence of Falls  Description: Document James Fall Risk and appropriate interventions in the flowsheet.   Outcome: Progressing Towards Goal  Note: Fall Risk Interventions:  Mobility Interventions: Bed/chair exit alarm, Patient to call before getting OOB         Medication Interventions: Bed/chair exit alarm, Patient to call before getting OOB, Teach patient to arise slowly    Elimination Interventions: Bed/chair exit alarm, Call light in reach, Patient to call for help with toileting needs              Problem: Patient Education: Go to Patient Education Activity  Goal: Patient/Family Education  Outcome: Progressing Towards Goal

## 2021-11-04 NOTE — PROGRESS NOTES
Received call from Dr. Steve Temple stating patient could be discharged from his standpoint and to follow up in 1-2 weeks.

## 2021-11-04 NOTE — PROGRESS NOTES
Progress Note      11/4/2021 6:32 AM  NAME: Shelly Recinos   MRN:  [id number]   Admit Diagnosis: Obstruction due to parastomal hernia [K43.3]  S/P exploratory laparotomy [Z98.890]      Problem List:   1.  Status post exploratory laparotomy with extensive lysis of adhesion  2.  History of colon cancer  3.  Chronic pain  4.  Atrial fibrillation with rapid ventricular response, converted to sinus rhythm  5.  Hypertension  6.  Dyslipidemia  7.  Type 2 diabetes  8.  Human immunodeficiency virus infection (HIV)  9.  Tinnitus  10. Hearing impaired     11.  Mild leukocytosis   12.  Anemia  13.  Electrolyte abnormalities (hypokalemia, and hyperchloremia)       Subjective: The patient is seen and examined in room 469. There were no acute cardiovascular events reported overnight. Yesterday, the patient was started on dronedarone, and apixaban. She remains in sinus rhythm and has tolerated both new medications. From a cardiovascular standpoint the patient may be discharged home. Discussed with RN events overnight. Medications Personally Reviewed:    Current Facility-Administered Medications   Medication Dose Route Frequency    metoprolol succinate (TOPROL-XL) XL tablet 50 mg  50 mg Oral DAILY    dronedarone (MULTAQ) tablet tab 400 mg  400 mg Oral BID WITH MEALS    apixaban (ELIQUIS) tablet 5 mg  5 mg Oral BID    acetaminophen (TYLENOL) tablet 650 mg  650 mg Oral Q6H PRN    furosemide (LASIX) injection 40 mg  40 mg IntraVENous Q12H    piperacillin-tazobactam (ZOSYN) 3.375 g in 0.9% sodium chloride (MBP/ADV) 100 mL MBP  3.375 g IntraVENous Q8H    ondansetron (ZOFRAN) injection 4 mg  4 mg IntraVENous Q6H PRN           Objective:      Physical Exam:  Last 24hrs VS reviewed since prior progress note.  Most recent are:    Visit Vitals  /81 (BP 1 Location: Right upper arm, BP Patient Position: At rest;Semi fowlers)   Pulse 94   Temp 98 °F (36.7 °C)   Resp 22   Ht 5' 5.35\" (1.66 m)   Wt 89.6 kg (197 lb 8.5 oz)   SpO2 98%   BMI 32.52 kg/m²       Intake/Output Summary (Last 24 hours) at 11/4/2021 6818  Last data filed at 11/3/2021 1719  Gross per 24 hour   Intake    Output 950 ml   Net -950 ml        General Appearance: Well developed, in no acute respiratory distress. Chest: Lungs clear to auscultation bilaterally. Cardiovascular: JVP is not elevated, PMI is not attempted, normal intensity S1-S2, without S3 or S4. Abdomen: Soft, non-tender, bowel sounds are active. Extremities: No edema bilaterally. Data Review    Telemetry: Sinus rhythm without ventricular ectopy    EKG:   []  No new EKG for review    Lab Data Personally Reviewed:    Recent Labs     11/01/21  0635   WBC 7.7   HGB 10.2*   HCT 31.4*        No results for input(s): INR, PTP, APTT, INREXT in the last 72 hours. Recent Labs     11/01/21  0635      K 3.1*      CO2 29   BUN 11   CREA 0.35*   *   CA 8.8   MG 1.6     No results for input(s): CPK, CKNDX, TROIQ in the last 72 hours. No lab exists for component: CPKMB  No results found for: CHOL, CHOLX, CHLST, CHOLV, HDL, HDLP, LDL, LDLC, DLDLP, TGLX, TRIGL, TRIGP, CHHD, CHHDX    No results for input(s): AP, TBIL, TP, ALB, GLOB, GGT, AML, LPSE in the last 72 hours. No lab exists for component: SGOT, GPT, AMYP, HLPSE  No results for input(s): PH, PCO2, PO2 in the last 72 hours. Assessment/Plan:   1. From cardiovascular standpoint the patient may be discharged home  2. Replace, recheck serum potassium  3. Continue current cardiovascular medications including apixaban, dronedarone, and metoprolol  4.   The patient is to follow-up in our office within 1 to 2 weeks after discharge     Arelis Cobb MD

## 2021-11-04 NOTE — PROGRESS NOTES
Biotin 5,000 - 10,000mcg daily   CM attempted to meet with patient to deliver IMM, however patient has DC prior to CM able to meet with patient.

## 2021-11-04 NOTE — DISCHARGE INSTRUCTIONS
Patient Education        Abdominal Hernia Repair: What to Expect at Home  Your Recovery  After surgery to repair your hernia, you are likely to have pain for a few days. You may also feel tired and have less energy than normal. This is common. You should feel better after a few days and will probably feel much better in 7 days. For several weeks you may feel discomfort or pulling in the hernia repair when you move. You may have some bruising around the area of the repair. This is normal.  This care sheet gives you a general idea about how long it will take for you to recover. But each person recovers at a different pace. Follow the steps below to get better as quickly as possible. How can you care for yourself at home? Activity    · Rest when you feel tired. Getting enough sleep will help you recover.     · Try to walk each day. Start by walking a little more than you did the day before. Bit by bit, increase the amount you walk. Walking boosts blood flow and helps prevent pneumonia and constipation.     · If your doctor gives you an abdominal binder to wear, use it as directed. This is an elastic bandage that wraps around your belly and upper hips. It helps support your belly muscles after surgery.     · Avoid strenuous activities, such as biking, jogging, weight lifting, or aerobic exercise, until your doctor says it is okay.     · Avoid lifting anything that would make you strain. This may include heavy grocery bags and milk containers, a heavy briefcase or backpack, cat litter or dog food bags, a vacuum , or a child.     · Ask your doctor when you can drive again.     · Most people are able to return to work within 1 to 2 weeks after surgery. But if your job requires that you do heavy lifting or strenuous activity, you may need to take 4 to 6 weeks off from work.     · You may shower 24 to 48 hours after surgery, if your doctor okays it. Pat the cut (incision) dry.  Do not take a bath for the first 2 weeks, or until your doctor tells you it is okay.     · Ask your doctor when it is okay for you to have sex. Diet    · You can eat your normal diet. If your stomach is upset, try bland, low-fat foods like plain rice, broiled chicken, toast, and yogurt.     · Drink plenty of fluids (unless your doctor tells you not to).     · You may notice that your bowel movements are not regular right after your surgery. This is common. Avoid constipation and straining with bowel movements. You may want to take a fiber supplement every day. If you have not had a bowel movement after a couple of days, ask your doctor about taking a mild laxative. Medicines    · Your doctor will tell you if and when you can restart your medicines. You will also be given instructions about taking any new medicines.     · If you take aspirin or some other blood thinner, ask your doctor if and when to start taking it again. Make sure that you understand exactly what your doctor wants you to do.     · Be safe with medicines. Take pain medicines exactly as directed. ? If the doctor gave you a prescription medicine for pain, take it as prescribed. ? If you are not taking a prescription pain medicine, ask your doctor if you can take an over-the-counter medicine.     · If your doctor prescribed antibiotics, take them as directed. Do not stop taking them just because you feel better. You need to take the full course of antibiotics.     · If you think your pain medicine is making you sick to your stomach:  ? Take your medicine after meals (unless your doctor has told you not to). ? Ask your doctor for a different pain medicine. Incision care    · If you have strips of tape on the cut (incision) the doctor made, leave the tape on for a week or until it falls off.  Or follow your doctor's instructions for removing the tape.     · If you have staples closing the cut, you will need to visit your doctor in 1 to 2 weeks to have them removed.     · Wash the area daily with warm, soapy water, and pat it dry. Don't use hydrogen peroxide or alcohol, which can slow healing. You may cover the area with a gauze bandage if it weeps or rubs against clothing. Change the bandage every day. Other instructions    · Hold a pillow over your incision when you cough or take deep breaths. This will support your belly and decrease your pain.     · Do breathing exercises at home as instructed by your doctor. This will help prevent pneumonia.     · If you had laparoscopic surgery, you may also have pain in your shoulder. The pain usually lasts about a day or two. Follow-up care is a key part of your treatment and safety. Be sure to make and go to all appointments, and call your doctor if you are having problems. It's also a good idea to know your test results and keep a list of the medicines you take. When should you call for help? Call 911 anytime you think you may need emergency care. For example, call if:    · You passed out (lost consciousness).     · You are short of breath. Call your doctor now or seek immediate medical care if:    · You are sick to your stomach and cannot drink fluids.     · You have signs of a blood clot in your leg (called a deep vein thrombosis), such as:  ? Pain in your calf, back of the knee, thigh, or groin. ? Redness and swelling in your leg or groin.     · You have signs of infection, such as:  ? Increased pain, swelling, warmth, or redness. ? Red streaks leading from the incision. ? Pus draining from the incision. ? A fever.     · You cannot pass stools or gas.     · You have pain that does not get better after you take pain medicine.     · You have loose stitches, or your incision comes open.     · Bright red blood has soaked through the bandage over your incision. Watch closely for changes in your health, and be sure to contact your doctor if you have any problems. Where can you learn more?   Go to http://www.gray.com/  Enter B577 in the search box to learn more about \"Abdominal Hernia Repair: What to Expect at Home. \"  Current as of: February 10, 2021               Content Version: 13.0  © 2006-2021 [employer]. Care instructions adapted under license by [employer] (which disclaims liability or warranty for this information). If you have questions about a medical condition or this instruction, always ask your healthcare professional. [facility] [address] any warranty or liability for your use of this information. Patient Education        Colostomy: What to Expect at Home  Your Recovery  After a colostomy, you can expect to feel better and stronger each day. But you may get tired quickly at first. Your belly may be sore, and you will probably need pain medicine for a week or two. Your stoma will be swollen at first. This is normal.  You may have very loose stools in your colostomy bag for a while. In time your stools may become firmer, but they will be less solid than before your surgery. You may also have a lot of gas pass into your colostomy bag in the weeks after surgery. This will decrease as you heal.  How quickly you get better depends, in part, on whether you had a laparoscopic or open surgery. But you will probably need at least 6 weeks to get back to your normal routine. This care sheet gives you a general idea about how long it will take for you to recover. But each person recovers at a different pace. Follow the steps below to get better as quickly as possible. How can you care for yourself at home? Activity    · Rest when you feel tired. Getting enough sleep will help you recover.     · Try to walk each day. Start by walking a little more than you did the day before. Bit by bit, increase the amount you walk.  Walking boosts blood flow and helps prevent pneumonia.     · Avoid strenuous activities, such as biking, jogging, weight lifting, or aerobic exercise, until your doctor says it is okay.     · For at least 6 weeks, avoid lifting anything that would make you strain. This may include heavy grocery bags and milk containers, a heavy briefcase or backpack, cat litter or dog food bags, a vacuum , or a child.     · Ask your doctor when you can drive again.     · You will probably need to take 6 weeks off from work. It depends on the type of work you do and how you feel.     · You can take a bath or shower as usual. You can bathe with your colostomy bag on or off.     · Ask your doctor when it is okay for you to have sex. Diet    · You may need to follow a low-fiber diet for the first few weeks after your surgery.     · Drink plenty of fluids (unless your doctor tells you not to). Medicines    · Your doctor will tell you if and when you can restart your medicines. He or she will also give you instructions about taking any new medicines.     · If you take aspirin or some other blood thinner, ask your doctor if and when to start taking it again. Make sure that you understand exactly what your doctor wants you to do.     · Take pain medicines exactly as directed. ? If the doctor gave you a prescription medicine for pain, take it as prescribed. ? If you are not taking a prescription pain medicine, ask your doctor if you can take an over-the-counter medicine.     · If your doctor prescribed antibiotics, take them as directed. Do not stop taking them just because you feel better. You need to take the full course of antibiotics.     · If you think your pain medicine is making you sick to your stomach:  ? Take your medicine after meals (unless your doctor has told you not to). ? Ask your doctor for a different pain medicine. Incision care    · If you have strips of tape on the cut (incision) the doctor made, leave the tape on for a week or until it falls off.  Or follow your doctor's instructions for removing the tape.     · Wash the area daily with warm, soapy water, and pat it dry. Don't use hydrogen peroxide or alcohol, which can slow healing. You may cover the area with a gauze bandage if it weeps or rubs against clothing. Change the bandage every day.     · Keep the area clean and dry. Other instructions    · Keep the area around your stoma clean and dry.     · Follow all instructions from your doctor or ostomy nurse.     · Empty and replace your colostomy bag as often as directed by your doctor or ostomy nurse. Follow-up care is a key part of your treatment and safety. Be sure to make and go to all appointments, and call your doctor if you are having problems. It's also a good idea to know your test results and keep a list of the medicines you take. When should you call for help? Call 911 anytime you think you may need emergency care. For example, call if:    · You passed out (lost consciousness).     · You are short of breath. Call your doctor now or seek immediate medical care if:    · You have pain that does not get better after you take your pain medicine.     · You have signs of infection, such as:  ? Increased pain, swelling, warmth, or redness. ? Red streaks leading from the stoma. ? Pus draining from the stoma. ? A fever.     · You are sick to your stomach or cannot drink fluids.     · You cannot pass stools or gas.     · Bright red blood has soaked through the bandage over your incision.     · You have loose stitches, or your incision comes open.     · You have signs of a blood clot in your leg (called a deep vein thrombosis), such as:  ? Pain in your calf, back of knee, thigh, or groin. ? Redness and swelling in your leg or groin.     · You have a problem with your stoma. Watch closely for changes in your health, and be sure to contact your doctor if you have any problems. Where can you learn more?   Go to http://www.gray.com/  Enter [id number] in the search box to learn more about \"Colostomy: What to Expect at Home. \"  Current as of: February 10, 2021               Content Version: 13.0  © 7973-2529 [employer]. Care instructions adapted under license by [employer] (which disclaims liability or warranty for this information). If you have questions about a medical condition or this instruction, always ask your healthcare professional. [address] any warranty or liability for your use of this information.

## 2021-11-06 NOTE — PROGRESS NOTES
Physician Progress Note      PATIENT:               Colt Esqueda  CSN #:                  [id number]  :                       1940  ADMIT DATE:       10/28/2021 12:10 PM  [address] [place] DATE:        2021 10:35 AM  RESPONDING  PROVIDER #:        Ruel Valdivia MD          QUERY TEXT:    Dr Dipika Loaiza and Dr Cristofer Morgan,    Patient admitted with incarcerated parastomal hernia. Noted documentation of acute hypoxic respiratory failure in [facility] dated 10/31 in a patient with documented RA sats >93%. In order to support the diagnosis of acute respiratory failure, please include additional clinical indicators in your documentation. Or please document if the diagnosis of acute respiratory failure has been ruled out after further study. The medical record reflects the following:  Risk Factors: volume overload    Clinical Indicators:    RA >93% RR 16 - 18  2L NC 99% RR 18 - 20    [facility] 10/31-  Acute respiratory failure with hypoxia-of note patient found to have shortness of breath as well as acute respiratory failure with hypoxia requiring 2 L nasal cannula for ventilatory support, based on patient's clinical presentation my inclination is patient is in volume overload, patient received significant fluid resuscitation throughout the course of this admission  Discontinue IV fluids  Stat chest x-ray  Lasix 40 mg IV twice daily  Obtain transthoracic echo  Attempt to wean oxygen    Treatment: supplemental O2; monitor pulse oximetry; CXR; stop 0/9% NS drip;  Lasix 40mg IV BID    Acute Respiratory Failure Clinical Indicators per [employer] Training Guide and Quick Reference Guide:  pO2 < 60 mmHg or SpO2 (pulse oximetry) < 91% breathing room air  pCO2 > 50 and pH < 7.35  P/F ratio (pO2 / FIO2) < 300  pO2 decrease or pCO2 increase by 10 mmHg from baseline (if known)  Supplemental oxygen of 40% or more  Presence of respiratory distress, tachypnea, dyspnea, shortness of breath, wheezing  Unable to speak in complete sentences  Use of accessory muscles to breathe  Extreme anxiety and feeling of impending doom  Tripod position  Confusion/altered mental status/obtunded      Thank you,  Amanda Griffiths RN, BSN, CCDS, CRCR, SMART  Clinical Documentation  Options provided:  -- Acute Respiratory Failure as evidenced by, Please document evidence. -- Acute Respiratory Failure ruled out after study  -- Other - I will add my own diagnosis  -- Disagree - Not applicable / Not valid  -- Disagree - Clinically unable to determine / Unknown  -- Refer to Clinical Documentation Reviewer    PROVIDER RESPONSE TEXT:    Provider is clinically unable to determine a response to this query. Query created by: Tigre Mckee on 11/2/2021 10:26 AM      QUERY TEXT:    Dr Urbano Li and Dr Cydney Leung,    Pt admitted with incarcerated parastomal hernia. Pt noted to have developed pulmonary edema on 11/1. If possible, please document in the progress notes and discharge summary if you are evaluating and/or treating any of the following: The medical record reflects the following:  Risk Factors: 0.9% NS boluses, fluid from IV Abx    Clinical Indicators:    Junidar CN 10/31-  based on patient's clinical presentation my inclination is patient is in volume overload, patient received significant fluid resuscitation throughout the course of this admission    Bhaty PN 11/1-  Acute hypoxic respiratory daily: lasix. Wean O2 to room air s/t pulmonary edema    CXR 10/31-  HEART AND PULMONARY VESSELS: Heart size upper normal. Mild vascular congestion    Treatment: CXR; Lasix 40mg IV BID;     Thank you,  Amanda Griffiths RN, BSN, CCDS, CRCR, SMART  Clinical Documentation  Options provided:  -- Acute pulmonary edema due to heart disease  -- Chronic pulmonary edema due to heart disease  -- Noncardiogenic acute pulmonary edema due to volume overload  -- Other - I will add my own diagnosis  -- Disagree - Not applicable / Not valid  -- Disagree - Clinically unable to determine / Unknown  -- Refer to Clinical Documentation Reviewer    PROVIDER RESPONSE TEXT:    Provider is clinically unable to determine a response to this query. Query created by: Yuliya Pozo on 11/2/2021 10:42 AM      QUERY TEXT:    Pt admitted with parastomal hernia. Pt noted to have HIV. If possible, please clarify in progress notes and discharge summary the patient?s HIV status as: The medical record reflects the following:  Risk Factors: hx HIV    Clinical Indicators:    7/12/21- Abs CD4 [place] [address]    Treatment: Biktarvy PO daily PTA; Sustuva 600mg PO HS PTA; Truvada 200-300mg PO daily PTA    Thank you,  Poly Atkinson RN, BSN, CCDS, CRCR, SMART  Clinical Documentation  Options provided:  -- Asymptomatic HIV  -- HIV positive by serology only  -- HIV disease/AIDs with PMH of HIV related illness or opportunistic infection  -- Other - I will add my own diagnosis  -- Disagree - Not applicable / Not valid  -- Disagree - Clinically unable to determine / Unknown  -- Refer to Clinical Documentation Reviewer    PROVIDER RESPONSE TEXT:    This patient has HIV disease/AIDS with PMH of HIV related illness or opportunistic infection.     Query created by: Yuliya Pozo on 11/2/2021 10:49 AM      Electronically signed by:  Kristen Barry MD 11/6/2021 1:30 PM

## 2021-11-27 NOTE — ANESTHESIA POSTPROCEDURE EVALUATION
Procedure(s):  REPAIR OF INCARCERATED PARASTOMA HERNIA, EXPLORATORY LAPAROTOMY (URGENT).     general    Anesthesia Post Evaluation        Anesthetic complications: no        INITIAL Post-op Vital signs:   Vitals Value Taken Time   /47 10/28/21 2153   Temp 36.3 °C (97.3 °F) 10/28/21 2134   Pulse 77 10/28/21 2153   Resp 20 10/28/21 2153   SpO2 98 % 10/28/21 2153

## 2022-02-03 ENCOUNTER — OFFICE VISIT (OUTPATIENT)
Dept: INFECTIOUS DISEASES | Age: 82
End: 2022-02-03
Payer: MEDICARE

## 2022-02-03 VITALS
BODY MASS INDEX: 27.78 KG/M2 | DIASTOLIC BLOOD PRESSURE: 78 MMHG | TEMPERATURE: 97.7 F | SYSTOLIC BLOOD PRESSURE: 132 MMHG | WEIGHT: 177 LBS | HEIGHT: 67 IN | RESPIRATION RATE: 15 BRPM | HEART RATE: 88 BPM

## 2022-02-03 DIAGNOSIS — M79.89 LEG SWELLING: ICD-10-CM

## 2022-02-03 DIAGNOSIS — Z21 ASYMPTOMATIC HIV INFECTION, WITH NO HISTORY OF HIV-RELATED ILLNESS (HCC): Primary | ICD-10-CM

## 2022-02-03 DIAGNOSIS — K43.5 PARASTOMAL HERNIA WITHOUT OBSTRUCTION OR GANGRENE: ICD-10-CM

## 2022-02-03 PROCEDURE — 1101F PT FALLS ASSESS-DOCD LE1/YR: CPT | Performed by: INTERNAL MEDICINE

## 2022-02-03 PROCEDURE — G8400 PT W/DXA NO RESULTS DOC: HCPCS | Performed by: INTERNAL MEDICINE

## 2022-02-03 PROCEDURE — G8417 CALC BMI ABV UP PARAM F/U: HCPCS | Performed by: INTERNAL MEDICINE

## 2022-02-03 PROCEDURE — G8428 CUR MEDS NOT DOCUMENT: HCPCS | Performed by: INTERNAL MEDICINE

## 2022-02-03 PROCEDURE — 1090F PRES/ABSN URINE INCON ASSESS: CPT | Performed by: INTERNAL MEDICINE

## 2022-02-03 PROCEDURE — G8536 NO DOC ELDER MAL SCRN: HCPCS | Performed by: INTERNAL MEDICINE

## 2022-02-03 PROCEDURE — G8510 SCR DEP NEG, NO PLAN REQD: HCPCS | Performed by: INTERNAL MEDICINE

## 2022-02-03 PROCEDURE — 99214 OFFICE O/P EST MOD 30 MIN: CPT | Performed by: INTERNAL MEDICINE

## 2022-02-03 RX ORDER — BICTEGRAVIR SODIUM, EMTRICITABINE, AND TENOFOVIR ALAFENAMIDE FUMARATE 50; 200; 25 MG/1; MG/1; MG/1
1 TABLET ORAL DAILY
Qty: 30 TABLET | Refills: 5 | Status: SHIPPED | OUTPATIENT
Start: 2022-02-03 | End: 2022-03-05

## 2022-02-03 NOTE — PROGRESS NOTES
Subjective  Cornelle Brighter    HPI  Pleasant 80 y.o BF presenting for a routine f/u of HIV infection, last seen in 07/2021. She underwent abdominal surgery for hernia repair by Dr Milo Cantu in 10/2021, post-op course was complicated by respiratory failure and Afib w RVR. She notes some weight loss post-op due to poor appetite, which is improving. She admits to continued compliance w her cART, Biktarvy, denies intolerable side effects. Most recent labs in 07/2021 showed a T cell count of 413  (29.5%) and an HIV RNA VL of 20 Copies/ml. She reports doing well clinical, denies acute complaints on assessment today. ROS  Review of Systems   Constitutional: Negative. Respiratory: Negative. Cardiovascular: Negative. Gastrointestinal: Negative. Genitourinary: Negative. Skin: Negative. Neurological: Negative. Psychiatric/Behavioral: Negative. Past Medical History:   Diagnosis Date    Cancer [facility])     cancer of colon    Chronic pain     [facility] [address]     Hearing loss 6/15/2020    HIV (human immunodeficiency virus infection) [facility] [address]     Hypercholesterolemia     Hypertension     Tinnitus 6/15/2020        Past Surgical History:   Procedure Laterality Date    HX COLONOSCOPY      HX GI      Colon surgery colostomy    HX GYN      hysterectomy    TN ABDOMEN SURGERY PROC UNLISTED      two hernia repairs        Social History     Tobacco Use    Smoking status: Never Smoker    Smokeless tobacco: Never Used    Tobacco comment: pt states quit more than 20 years ago   Vaping Use    Vaping Use: Never used   Substance Use Topics    Alcohol use: Never    Drug use: Never        Family History   Problem Relation Age of Onset    Diabetes Mother     Cancer Father         No Known Allergies     Objective  Physical Exam  Constitutional:       Appearance: Normal appearance. Cardiovascular:      Rate and Rhythm: Normal rate and regular rhythm. Pulses: Normal pulses.       Heart sounds: Normal heart sounds. Pulmonary:      Effort: Pulmonary effort is normal.      Breath sounds: Normal breath sounds. Abdominal:      Comments: Obese, diverting colostomy    Musculoskeletal:         General: Swelling present. No tenderness. Skin:     General: Skin is warm and dry. Neurological:      General: No focal deficit present. Mental Status: She is alert and oriented to person, place, and time. Assessment & Plan  Diagnoses and all orders for this visit:    1. Asymptomatic HIV infection, with no history of HIV-related illness (HCC)  -     LYMPHOCYTES, CD4 PERCENT AND ABSOLUTE  -     HIV-1 RNA QT BY PCR  -     METABOLIC PANEL, COMPREHENSIVE  -     stwaehrlt-hcabkrhr-mdacyqy ala (Biktarvy) tab tablet; Take 1 Tablet by mouth daily for 30 days.  -  Encouraged on continued compliance w cART   -  Refills for Biktarvy renewed   - Repeat labs as above, reassess in 6 months     2. Parastomal hernia without obstruction or gangrene: S/p repair, currently has a diverting colostomy     3. B/l leg swelling:  To be addressed by PCP, Dr Quezada Rater

## 2022-03-18 PROBLEM — K43.3 OBSTRUCTION DUE TO PARASTOMAL HERNIA: Status: ACTIVE | Noted: 2021-10-28

## 2022-03-18 PROBLEM — Z98.890 S/P EXPLORATORY LAPAROTOMY: Status: ACTIVE | Noted: 2021-10-28

## 2022-03-19 PROBLEM — H91.90 HEARING LOSS: Status: ACTIVE | Noted: 2020-06-15

## 2022-03-20 PROBLEM — H93.19 TINNITUS: Status: ACTIVE | Noted: 2020-06-15

## 2022-04-13 ENCOUNTER — HOSPITAL ENCOUNTER (EMERGENCY)
Age: 82
Discharge: HOME | End: 2022-04-14
Attending: FAMILY MEDICINE
Payer: MEDICARE

## 2022-04-13 ENCOUNTER — APPOINTMENT (OUTPATIENT)
Dept: GENERAL RADIOLOGY | Age: 82
End: 2022-04-13
Attending: FAMILY MEDICINE
Payer: MEDICARE

## 2022-04-13 DIAGNOSIS — I51.7 CARDIOMEGALY: ICD-10-CM

## 2022-04-13 DIAGNOSIS — R06.00 DYSPNEA, UNSPECIFIED TYPE: Primary | ICD-10-CM

## 2022-04-13 LAB
ANION GAP SERPL CALC-SCNC: 10 MMOL/L (ref 5–15)
BASOPHILS # BLD: 0 K/UL (ref 0–0.1)
BASOPHILS NFR BLD: 0 % (ref 0–1)
BUN SERPL-MCNC: 19 MG/DL (ref 6–20)
BUN/CREAT SERPL: 25 (ref 12–20)
CA-I BLD-MCNC: 10 MG/DL (ref 8.5–10.1)
CHLORIDE SERPL-SCNC: 103 MMOL/L (ref 97–108)
CO2 SERPL-SCNC: 25 MMOL/L (ref 21–32)
CREAT SERPL-MCNC: 0.77 MG/DL (ref 0.55–1.02)
DIFFERENTIAL METHOD BLD: ABNORMAL
EOSINOPHIL # BLD: 0 K/UL (ref 0–0.4)
EOSINOPHIL NFR BLD: 1 % (ref 0–7)
ERYTHROCYTE [DISTWIDTH] IN BLOOD BY AUTOMATED COUNT: 14.4 % (ref 11.5–14.5)
FLUAV AG NPH QL IA: NEGATIVE
FLUBV AG NOSE QL IA: NEGATIVE
GLUCOSE SERPL-MCNC: 177 MG/DL (ref 65–100)
HCT VFR BLD AUTO: 33.7 % (ref 35–47)
HGB BLD-MCNC: 10.9 G/DL (ref 11.5–16)
IMM GRANULOCYTES # BLD AUTO: 0 K/UL (ref 0–0.04)
IMM GRANULOCYTES NFR BLD AUTO: 0 % (ref 0–0.5)
LYMPHOCYTES # BLD: 1.8 K/UL (ref 0.8–3.5)
LYMPHOCYTES NFR BLD: 48 % (ref 12–49)
MCH RBC QN AUTO: 27.9 PG (ref 26–34)
MCHC RBC AUTO-ENTMCNC: 32.3 G/DL (ref 30–36.5)
MCV RBC AUTO: 86.2 FL (ref 80–99)
MONOCYTES # BLD: 0.3 K/UL (ref 0–1)
MONOCYTES NFR BLD: 9 % (ref 5–13)
NEUTS SEG # BLD: 1.6 K/UL (ref 1.8–8)
NEUTS SEG NFR BLD: 42 % (ref 32–75)
PLATELET # BLD AUTO: 246 K/UL (ref 150–400)
PMV BLD AUTO: 10.6 FL (ref 8.9–12.9)
POTASSIUM SERPL-SCNC: 3.8 MMOL/L (ref 3.5–5.1)
RBC # BLD AUTO: 3.91 M/UL (ref 3.8–5.2)
SARS-COV-2, COV2: NORMAL
SODIUM SERPL-SCNC: 138 MMOL/L (ref 136–145)
TROPONIN-HIGH SENSITIVITY: 7 NG/L (ref 0–51)
WBC # BLD AUTO: 3.7 K/UL (ref 3.6–11)

## 2022-04-13 PROCEDURE — 80048 BASIC METABOLIC PNL TOTAL CA: CPT

## 2022-04-13 PROCEDURE — 71045 X-RAY EXAM CHEST 1 VIEW: CPT

## 2022-04-13 PROCEDURE — 93005 ELECTROCARDIOGRAM TRACING: CPT

## 2022-04-13 PROCEDURE — 83880 ASSAY OF NATRIURETIC PEPTIDE: CPT

## 2022-04-13 PROCEDURE — 85025 COMPLETE CBC W/AUTO DIFF WBC: CPT

## 2022-04-13 PROCEDURE — 84484 ASSAY OF TROPONIN QUANT: CPT

## 2022-04-13 PROCEDURE — U0005 INFEC AGEN DETEC AMPLI PROBE: HCPCS

## 2022-04-13 PROCEDURE — 99285 EMERGENCY DEPT VISIT HI MDM: CPT

## 2022-04-13 PROCEDURE — 87804 INFLUENZA ASSAY W/OPTIC: CPT

## 2022-04-13 RX ORDER — GABAPENTIN 300 MG/1
CAPSULE ORAL
COMMUNITY
Start: 2022-02-01

## 2022-04-13 RX ORDER — LOSARTAN POTASSIUM 50 MG/1
TABLET ORAL
COMMUNITY
Start: 2022-02-01 | End: 2022-04-13

## 2022-04-13 RX ORDER — ATENOLOL 50 MG/1
TABLET ORAL
COMMUNITY
Start: 2022-02-01

## 2022-04-13 RX ORDER — HYDROCHLOROTHIAZIDE 25 MG/1
TABLET ORAL
COMMUNITY
Start: 2022-04-05

## 2022-04-14 VITALS
WEIGHT: 178 LBS | BODY MASS INDEX: 27.94 KG/M2 | TEMPERATURE: 99 F | HEIGHT: 67 IN | HEART RATE: 67 BPM | SYSTOLIC BLOOD PRESSURE: 157 MMHG | OXYGEN SATURATION: 100 % | RESPIRATION RATE: 18 BRPM | DIASTOLIC BLOOD PRESSURE: 67 MMHG

## 2022-04-14 LAB
BNP SERPL-MCNC: 313 PG/ML
TROPONIN-HIGH SENSITIVITY: 8 NG/L (ref 0–51)

## 2022-04-14 PROCEDURE — 84484 ASSAY OF TROPONIN QUANT: CPT

## 2022-04-14 NOTE — ED TRIAGE NOTES
Pt states had period of difficulty breathing earlier at home when lying down in bed, resolved upon arrival, pt denies SOB, cough or chest pain

## 2022-04-14 NOTE — DISCHARGE INSTRUCTIONS
Thank you! Thank you for allowing me to care for you in the emergency department. I sincerely hope that you are satisfied with your visit today. It is my goal to provide you with excellent care. Below you will find a list of your labs and imaging from your visit today. Should you have any questions regarding these results please do not hesitate to call the emergency department. Labs -     Recent Results (from the past 12 hour(s))   CBC WITH AUTOMATED DIFF    Collection Time: 04/13/22 10:30 PM   Result Value Ref Range    WBC 3.7 3.6 - 11.0 K/uL    RBC 3.91 3.80 - 5.20 M/uL    HGB 10.9 (L) 11.5 - 16.0 g/dL    HCT 33.7 (L) 35.0 - 47.0 %    MCV 86.2 80.0 - 99.0 FL    MCH 27.9 26.0 - 34.0 PG    MCHC 32.3 30.0 - 36.5 g/dL    RDW 14.4 11.5 - 14.5 %    PLATELET 683 372 - 417 K/uL    MPV 10.6 8.9 - 12.9 FL    NEUTROPHILS 42 32 - 75 %    LYMPHOCYTES 48 12 - 49 %    MONOCYTES 9 5 - 13 %    EOSINOPHILS 1 0 - 7 %    BASOPHILS 0 0 - 1 %    IMMATURE GRANULOCYTES 0 0.0 - 0.5 %    ABS. NEUTROPHILS 1.6 (L) 1.8 - 8.0 K/UL    ABS. LYMPHOCYTES 1.8 0.8 - 3.5 K/UL    ABS. MONOCYTES 0.3 0.0 - 1.0 K/UL    ABS. EOSINOPHILS 0.0 0.0 - 0.4 K/UL    ABS. BASOPHILS 0.0 0.0 - 0.1 K/UL    ABS. IMM.  GRANS. 0.0 0.00 - 0.04 K/UL    DF AUTOMATED     METABOLIC PANEL, BASIC    Collection Time: 04/13/22 10:30 PM   Result Value Ref Range    Sodium 138 136 - 145 mmol/L    Potassium 3.8 3.5 - 5.1 mmol/L    Chloride 103 97 - 108 mmol/L    CO2 25 21 - 32 mmol/L    Anion gap 10 5 - 15 mmol/L    Glucose 177 (H) 65 - 100 mg/dL    BUN 19 6 - 20 mg/dL    Creatinine 0.77 0.55 - 1.02 mg/dL    BUN/Creatinine ratio 25 (H) 12 - 20      GFR est AA >60 >60 ml/min/1.73m2    GFR est non-AA >60 >60 ml/min/1.73m2    Calcium 10.0 8.5 - 10.1 mg/dL   TROPONIN-HIGH SENSITIVITY    Collection Time: 04/13/22 10:30 PM   Result Value Ref Range    Troponin-High Sensitivity 7 0 - 51 ng/L   INFLUENZA A & B AG (RAPID TEST)    Collection Time: 04/13/22 10:30 PM   Result Value Ref Range    Influenza A Antigen Negative Negative      Influenza B Antigen Negative Negative     SARS-COV-2    Collection Time: 04/13/22 10:30 PM   Result Value Ref Range    SARS-CoV-2 by PCR Please find results under separate order     NT-PRO BNP    Collection Time: 04/13/22 10:45 PM   Result Value Ref Range    NT pro- <450 pg/mL   TROPONIN-HIGH SENSITIVITY    Collection Time: 04/14/22 12:14 AM   Result Value Ref Range    Troponin-High Sensitivity 8 0 - 51 ng/L       Radiologic Studies -   XR CHEST PORT   Final Result   There is mild cardiomegaly without associated vascular congestion or pulmonary   edema. The lungs are clear without acute pulmonary parenchymal pathology. CT Results  (Last 48 hours)      None          CXR Results  (Last 48 hours)                 04/13/22 2244  XR CHEST PORT Final result    Impression:  There is mild cardiomegaly without associated vascular congestion or pulmonary   edema. The lungs are clear without acute pulmonary parenchymal pathology. Narrative:  Reason for Visit:    HISTORY: Chest pain. TECHNIQUE: Portable radiograph of the chest dated 4/13/2022. COMPARISON: 10/31/2021. LIMITATIONS: None. TUBES/LINES:  None. HEART AND PULMONARY VESSELS: There is moderate enlargement of the cardiac   silhouette with rounding of the left ventricular apex. A rounding of the left   ventricular apex may be associated with left ventricular muscular hypertrophy. This examination is negative for pulmonary vascular congestion or pulmonary   interstitial edema. LUNG PARENCHYMA: There is a normal volume to the lungs. The lung parenchyma is   clear without an infiltrate or additional acute pulmonary parenchymal pathology. PLEURA: The costophrenic angles are maintained without pleural effusions or   pneumothorax. MEDIASTINUM: There are calcification within the aortic knob.  Otherwise, the   mediastinal structures are normal. BONE/SOFT TISSUES: The osseous structures are unremarkable for the patient's   age. If you feel that you have not received excellent quality care or timely care, please ask to speak to the nurse manager. Please choose us in the future for your continued health care needs. ------------------------------------------------------------------------------------------------------------  The exam and treatment you received in the Emergency Department were for an urgent problem and are not intended as complete care. It is important that you follow-up with a doctor, nurse practitioner, or physician assistant to:  (1) confirm your diagnosis,  (2) re-evaluation of changes in your illness and treatment, and  (3) for ongoing care. If your symptoms become worse or you do not improve as expected and you are unable to reach your usual health care provider, you should return to the Emergency Department. We are available 24 hours a day. Please take your discharge instructions with you when you go to your follow-up appointment. If you have any problem arranging a follow-up appointment, contact the Emergency Department immediately. If a prescription has been provided, please have it filled as soon as possible to prevent a delay in treatment. Read the entire medication instruction sheet provided to you by the pharmacy. If you have any questions or reservations about taking the medication due to side effects or interactions with other medications, please call your primary care physician or contact the ER to speak with the charge nurse. Make an appointment with your family doctor or the physician you were referred to for follow-up of this visit as instructed on your discharge paperwork, as this is a mandatory follow-up. Return to the ER if you are unable to be seen or if you are unable to be seen in a timely manner.     If you have any problem arranging the follow-up visit, contact the Emergency Department immediately.

## 2022-04-15 ENCOUNTER — PATIENT OUTREACH (OUTPATIENT)
Dept: CASE MANAGEMENT | Age: 82
End: 2022-04-15

## 2022-04-15 LAB
ATRIAL RATE: 68 BPM
CALCULATED P AXIS, ECG09: 72 DEGREES
CALCULATED R AXIS, ECG10: -7 DEGREES
CALCULATED T AXIS, ECG11: 39 DEGREES
DIAGNOSIS, 93000: NORMAL
P-R INTERVAL, ECG05: 88 MS
Q-T INTERVAL, ECG07: 382 MS
QRS DURATION, ECG06: 88 MS
QTC CALCULATION (BEZET), ECG08: 406 MS
SARS-COV-2, XPLCVT: NOT DETECTED
SOURCE, COVRS: NORMAL
VENTRICULAR RATE, ECG03: 68 BPM

## 2022-04-15 NOTE — ED PROVIDER NOTES
EMERGENCY DEPARTMENT HISTORY AND PHYSICAL EXAM      Date: 4/13/2022  Patient Name: Presley Haley    History of Presenting Illness     Chief Complaint   Patient presents with    Shortness of Breath       History Provided By:     HPI: Presley Haley, is an extremely pleasant 80 y.o. female presenting to the ED with a chief complaint of shortness of breath. Patient states earlier today she had an episode lasting approximately 15 minutes. This happened while she was resting in bed. No associated chest pain. No nausea or diaphoresis. No arm pain jaw pain or back pain. No pleuritic symptoms. Patient states she feels well and denies any complaints at this time    There are no other complaints, changes, or physical findings at this time. PCP: Nathanael Redmond MD    No current facility-administered medications on file prior to encounter. Current Outpatient Medications on File Prior to Encounter   Medication Sig Dispense Refill    gabapentin (NEURONTIN) 300 mg capsule       hydroCHLOROthiazide (HYDRODIURIL) 25 mg tablet       atenoloL (TENORMIN) 50 mg tablet       furosemide (LASIX) 20 mg tablet Take 2 Tablets by mouth daily. 30 Tablet 1    folic acid/multivit-min/lutein (CENTRUM SILVER PO) Take 1 Tablet by mouth daily.  acetaminophen (Tylenol Extra Strength) 500 mg tablet Take 500 mg by mouth daily.  amLODIPine (NORVASC) 5 mg tablet Take 5 mg by mouth daily.  atorvastatin (LIPITOR) 20 mg tablet Take 20 mg by mouth daily.  metFORMIN (GLUCOPHAGE) 500 mg tablet Take 500 mg by mouth daily (with breakfast).  sAXagliptin (Onglyza) 5 mg tab tablet Take 5 mg by mouth daily.          Past History     Past Medical History:  Past Medical History:   Diagnosis Date    Cancer [facility])     cancer of colon    Chronic pain     [facility] [address]     Diabetic neuropathy [facility] [address] [facility]     Hearing loss 6/15/2020    HIV [facility]human immunodeficiency virus infection) ([facility] [address]     Hypercholesterolemia     Hypertension  Tinnitus 6/15/2020       Past Surgical History:  Past Surgical History:   Procedure Laterality Date    HX COLONOSCOPY      HX GI      Colon surgery colostomy    HX GYN      hysterectomy    AL ABDOMEN SURGERY PROC UNLISTED      two hernia repairs       Family History:  Family History   Problem Relation Age of Onset    Diabetes Mother     Cancer Father        Social History:  Social History     Tobacco Use    Smoking status: Never Smoker    Smokeless tobacco: Never Used    Tobacco comment: pt states quit more than 20 years ago   Vaping Use    Vaping Use: Never used   Substance Use Topics    Alcohol use: Never    Drug use: Never       Allergies:  No Known Allergies      Review of Systems     Review of Systems   Constitutional: Negative for activity change, appetite change, chills, fatigue and fever. HENT: Negative for congestion and sore throat. Eyes: Negative for photophobia and visual disturbance. Respiratory: Positive for shortness of breath. Negative for cough and wheezing. Cardiovascular: Negative for chest pain, palpitations and leg swelling. Gastrointestinal: Negative for abdominal pain, diarrhea, nausea and vomiting. Endocrine: Negative for cold intolerance and heat intolerance. Musculoskeletal: Negative for gait problem and joint swelling. Skin: Negative for color change and rash. Neurological: Negative for dizziness and headaches. Physical Exam     Physical Exam  Constitutional:       Appearance: She is well-developed. HENT:      Head: Normocephalic and atraumatic. Mouth/Throat:      Mouth: Mucous membranes are moist.      Pharynx: Oropharynx is clear. Eyes:      Conjunctiva/sclera: Conjunctivae normal.      Pupils: Pupils are equal, round, and reactive to light. Cardiovascular:      Rate and Rhythm: Normal rate and regular rhythm. Heart sounds: No murmur heard. Pulmonary:      Effort: No tachypnea, bradypnea or respiratory distress.       Breath sounds: No stridor. No wheezing, rhonchi or rales. Abdominal:      General: There is no distension. Tenderness: There is no abdominal tenderness. There is no rebound. Musculoskeletal:      Cervical back: Normal range of motion and neck supple. Skin:     General: Skin is warm and dry. Neurological:      General: No focal deficit present. Mental Status: She is alert and oriented to person, place, and time. Psychiatric:         Mood and Affect: Mood normal.         Behavior: Behavior normal.         Lab and Diagnostic Study Results     Labs -   No results found for this or any previous visit (from the past 12 hour(s)). Radiologic Studies -   @lastxrresult@  CT Results  (Last 48 hours)    None        CXR Results  (Last 48 hours)               04/13/22 2244  XR CHEST PORT Final result    Impression:  There is mild cardiomegaly without associated vascular congestion or pulmonary   edema. The lungs are clear without acute pulmonary parenchymal pathology. Narrative:  Reason for Visit:    HISTORY: Chest pain. TECHNIQUE: Portable radiograph of the chest dated 4/13/2022. COMPARISON: 10/31/2021. LIMITATIONS: None. TUBES/LINES:  None. HEART AND PULMONARY VESSELS: There is moderate enlargement of the cardiac   silhouette with rounding of the left ventricular apex. A rounding of the left   ventricular apex may be associated with left ventricular muscular hypertrophy. This examination is negative for pulmonary vascular congestion or pulmonary   interstitial edema. LUNG PARENCHYMA: There is a normal volume to the lungs. The lung parenchyma is   clear without an infiltrate or additional acute pulmonary parenchymal pathology. PLEURA: The costophrenic angles are maintained without pleural effusions or   pneumothorax. MEDIASTINUM: There are calcification within the aortic knob.  Otherwise, the   mediastinal structures are normal.       BONE/SOFT TISSUES: The osseous structures are unremarkable for the patient's   age. Medical Decision Making   - I am the first provider for this patient. - I reviewed the vital signs, available nursing notes, past medical history, past surgical history, family history and social history. - Initial assessment performed. The patients presenting problems have been discussed, and they are in agreement with the care plan formulated and outlined with them. I have encouraged them to ask questions as they arise throughout their visit. Vital Signs-Reviewed the patient's vital signs. No data found. ED Course/ Provider Notes (Medical Decision Making):     Patient presented to the emergency department with a chief complaint of shortness of breath  On examination the patient is nontoxic and well-appearing. Vitals were reviewed per above. EKG normal sinus rhythm, no STEMI. Chest x-ray demonstrates cardiomegaly without other abnormality. High-sensitivity troponin 7, repeat, 8. . Patient's lungs remain clear with oxygen saturation greater than 99% on room air. Feel patient stable for discharge with close follow-up with PCP for self limiting episode of dyspnea. Earlie Query was given a thorough list of signs and symptoms that would warrant an immediate return to the emergency department. Otherwise Earlie Query will follow up with PCP. Procedures   Medical Decision Makingedical Decision Making  Performed by: Irina Serrano DO  Procedures  None       Disposition   Disposition:     Home    All of the diagnostic tests were reviewed and questions answered. Diagnosis, care plan and treatment options were discussed. The patient understands the instructions and will follow up as directed. The patients results have been reviewed with them. They have been counseled regarding their diagnosis.   The patient verbally convey understanding and agreement of the signs, symptoms, diagnosis, treatment and prognosis and additionally agrees to follow up as recommended with their PCP in 24 - 48 hours. They also agree with the care-plan and convey that all of their questions have been answered. I have also put together some discharge instructions for them that include: 1) educational information regarding their diagnosis, 2) how to care for their diagnosis at home, as well a 3) list of reasons why they would want to return to the ED prior to their follow-up appointment, should their condition change. DISCHARGE PLAN:    1. Cannot display discharge medications since this patient is not currently admitted. 2.   Follow-up Information     Follow up With Specialties Details Why Contact Info    Your primary care doctor  Schedule an appointment as soon as possible for a visit in 2 days      [address] Card[facility] Call   Leslie [phone] [facility]  [phone]            3. Return to ED if worse       4. Discharge Medication List as of 4/14/2022  1:02 AM            Diagnosis     Clinical Impression:    1. Dyspnea, unspecified type    2. Cardiomegaly        Attestations:    Jeane Sherman, DO    Please note that this dictation was completed with [employer], the computer voice recognition software. Quite often unanticipated grammatical, syntax, homophones, and other interpretive errors are inadvertently transcribed by the computer software. Please disregard these errors. Please excuse any errors that have escaped final proofreading. Thank you.

## 2022-05-28 ENCOUNTER — HOSPITAL ENCOUNTER (EMERGENCY)
Age: 82
Discharge: HOME | End: 2022-05-29
Attending: STUDENT IN AN ORGANIZED HEALTH CARE EDUCATION/TRAINING PROGRAM
Payer: MEDICARE

## 2022-05-28 DIAGNOSIS — E86.0 DEHYDRATION: Primary | ICD-10-CM

## 2022-05-28 DIAGNOSIS — N30.00 ACUTE CYSTITIS WITHOUT HEMATURIA: ICD-10-CM

## 2022-05-28 DIAGNOSIS — E87.6 HYPOKALEMIA: ICD-10-CM

## 2022-05-28 DIAGNOSIS — E86.1 HYPOVOLEMIA: ICD-10-CM

## 2022-05-28 DIAGNOSIS — I95.1 ORTHOSTATIC HYPOTENSION: ICD-10-CM

## 2022-05-28 LAB
ALBUMIN SERPL-MCNC: 3.4 G/DL (ref 3.5–5)
ALBUMIN/GLOB SERPL: 0.9 {RATIO} (ref 1.1–2.2)
ALP SERPL-CCNC: 64 U/L (ref 45–117)
ALT SERPL-CCNC: 19 U/L (ref 12–78)
ANION GAP SERPL CALC-SCNC: 4 MMOL/L (ref 5–15)
AST SERPL W P-5'-P-CCNC: 23 U/L (ref 15–37)
BASOPHILS # BLD: 0 K/UL (ref 0–0.1)
BASOPHILS NFR BLD: 0 % (ref 0–1)
BILIRUB SERPL-MCNC: 0.3 MG/DL (ref 0.2–1)
BUN SERPL-MCNC: 17 MG/DL (ref 6–20)
BUN/CREAT SERPL: 16 (ref 12–20)
CA-I BLD-MCNC: 9.6 MG/DL (ref 8.5–10.1)
CHLORIDE SERPL-SCNC: 109 MMOL/L (ref 97–108)
CO2 SERPL-SCNC: 29 MMOL/L (ref 21–32)
CREAT SERPL-MCNC: 1.05 MG/DL (ref 0.55–1.02)
DIFFERENTIAL METHOD BLD: ABNORMAL
EOSINOPHIL # BLD: 0 K/UL (ref 0–0.4)
EOSINOPHIL NFR BLD: 0 % (ref 0–7)
ERYTHROCYTE [DISTWIDTH] IN BLOOD BY AUTOMATED COUNT: 15.1 % (ref 11.5–14.5)
GLOBULIN SER CALC-MCNC: 3.8 G/DL (ref 2–4)
GLUCOSE SERPL-MCNC: 138 MG/DL (ref 65–100)
HCT VFR BLD AUTO: 35 % (ref 35–47)
HGB BLD-MCNC: 11.2 G/DL (ref 11.5–16)
IMM GRANULOCYTES # BLD AUTO: 0 K/UL (ref 0–0.04)
IMM GRANULOCYTES NFR BLD AUTO: 0 % (ref 0–0.5)
LYMPHOCYTES # BLD: 0.8 K/UL (ref 0.8–3.5)
LYMPHOCYTES NFR BLD: 20 % (ref 12–49)
MCH RBC QN AUTO: 27.2 PG (ref 26–34)
MCHC RBC AUTO-ENTMCNC: 32 G/DL (ref 30–36.5)
MCV RBC AUTO: 85 FL (ref 80–99)
MONOCYTES # BLD: 0.2 K/UL (ref 0–1)
MONOCYTES NFR BLD: 5 % (ref 5–13)
NEUTS SEG # BLD: 3 K/UL (ref 1.8–8)
NEUTS SEG NFR BLD: 75 % (ref 32–75)
NRBC # BLD: 0 K/UL (ref 0–0.01)
NRBC BLD-RTO: 0 PER 100 WBC
PLATELET # BLD AUTO: 212 K/UL (ref 150–400)
PMV BLD AUTO: 9.9 FL (ref 8.9–12.9)
POTASSIUM SERPL-SCNC: 3.2 MMOL/L (ref 3.5–5.1)
PROT SERPL-MCNC: 7.2 G/DL (ref 6.4–8.2)
RBC # BLD AUTO: 4.12 M/UL (ref 3.8–5.2)
SODIUM SERPL-SCNC: 142 MMOL/L (ref 136–145)
TROPONIN-HIGH SENSITIVITY: 11 NG/L (ref 0–51)
TROPONIN-HIGH SENSITIVITY: 8 NG/L (ref 0–51)
WBC # BLD AUTO: 4 K/UL (ref 3.6–11)

## 2022-05-28 PROCEDURE — 74011250637 HC RX REV CODE- 250/637: Performed by: STUDENT IN AN ORGANIZED HEALTH CARE EDUCATION/TRAINING PROGRAM

## 2022-05-28 PROCEDURE — 96361 HYDRATE IV INFUSION ADD-ON: CPT

## 2022-05-28 PROCEDURE — 36415 COLL VENOUS BLD VENIPUNCTURE: CPT

## 2022-05-28 PROCEDURE — 74011250636 HC RX REV CODE- 250/636: Performed by: STUDENT IN AN ORGANIZED HEALTH CARE EDUCATION/TRAINING PROGRAM

## 2022-05-28 PROCEDURE — 80053 COMPREHEN METABOLIC PANEL: CPT

## 2022-05-28 PROCEDURE — 99284 EMERGENCY DEPT VISIT MOD MDM: CPT

## 2022-05-28 PROCEDURE — 84484 ASSAY OF TROPONIN QUANT: CPT

## 2022-05-28 PROCEDURE — 85025 COMPLETE CBC W/AUTO DIFF WBC: CPT

## 2022-05-28 PROCEDURE — 96365 THER/PROPH/DIAG IV INF INIT: CPT

## 2022-05-28 PROCEDURE — 93005 ELECTROCARDIOGRAM TRACING: CPT

## 2022-05-28 RX ORDER — POTASSIUM CHLORIDE 20 MEQ/1
40 TABLET, EXTENDED RELEASE ORAL
Status: COMPLETED | OUTPATIENT
Start: 2022-05-28 | End: 2022-05-28

## 2022-05-28 RX ORDER — POTASSIUM CHLORIDE 7.45 MG/ML
10 INJECTION INTRAVENOUS ONCE
Status: COMPLETED | OUTPATIENT
Start: 2022-05-28 | End: 2022-05-29

## 2022-05-28 RX ADMIN — SODIUM CHLORIDE 1000 ML: 9 INJECTION, SOLUTION INTRAVENOUS at 21:41

## 2022-05-28 RX ADMIN — POTASSIUM CHLORIDE 40 MEQ: 1500 TABLET, EXTENDED RELEASE ORAL at 22:58

## 2022-05-28 RX ADMIN — POTASSIUM CHLORIDE 10 MEQ: 7.46 INJECTION, SOLUTION INTRAVENOUS at 22:50

## 2022-05-29 VITALS
SYSTOLIC BLOOD PRESSURE: 100 MMHG | OXYGEN SATURATION: 99 % | HEIGHT: 67 IN | RESPIRATION RATE: 16 BRPM | DIASTOLIC BLOOD PRESSURE: 50 MMHG | BODY MASS INDEX: 26.68 KG/M2 | WEIGHT: 170 LBS | HEART RATE: 63 BPM | TEMPERATURE: 97.8 F

## 2022-05-29 LAB
APPEARANCE UR: ABNORMAL
ATRIAL RATE: 64 BPM
BACTERIA URNS QL MICRO: ABNORMAL /HPF
BILIRUB UR QL: NEGATIVE
CALCULATED P AXIS, ECG09: 34 DEGREES
CALCULATED R AXIS, ECG10: -11 DEGREES
CALCULATED T AXIS, ECG11: 10 DEGREES
COLOR UR: ABNORMAL
DIAGNOSIS, 93000: NORMAL
GLUCOSE UR STRIP.AUTO-MCNC: NEGATIVE MG/DL
HGB UR QL STRIP: ABNORMAL
KETONES UR QL STRIP.AUTO: NEGATIVE MG/DL
LEUKOCYTE ESTERASE UR QL STRIP.AUTO: ABNORMAL
MUCOUS THREADS URNS QL MICRO: ABNORMAL /LPF
NITRITE UR QL STRIP.AUTO: NEGATIVE
P-R INTERVAL, ECG05: 110 MS
PH UR STRIP: 7 [PH] (ref 5–8)
PROT UR STRIP-MCNC: NEGATIVE MG/DL
Q-T INTERVAL, ECG07: 396 MS
QRS DURATION, ECG06: 80 MS
QTC CALCULATION (BEZET), ECG08: 408 MS
RBC #/AREA URNS HPF: ABNORMAL /HPF (ref 0–5)
SP GR UR REFRACTOMETRY: 1.01 (ref 1–1.03)
UROBILINOGEN UR QL STRIP.AUTO: 0.1 EU/DL (ref 0.1–1)
VENTRICULAR RATE, ECG03: 64 BPM
WBC URNS QL MICRO: ABNORMAL /HPF (ref 0–4)

## 2022-05-29 PROCEDURE — 96366 THER/PROPH/DIAG IV INF ADDON: CPT

## 2022-05-29 PROCEDURE — 81001 URINALYSIS AUTO W/SCOPE: CPT

## 2022-05-29 PROCEDURE — 74011250637 HC RX REV CODE- 250/637: Performed by: STUDENT IN AN ORGANIZED HEALTH CARE EDUCATION/TRAINING PROGRAM

## 2022-05-29 RX ORDER — SULFAMETHOXAZOLE AND TRIMETHOPRIM 800; 160 MG/1; MG/1
1 TABLET ORAL 2 TIMES DAILY
Qty: 14 TABLET | Refills: 0 | Status: SHIPPED | OUTPATIENT
Start: 2022-05-29 | End: 2022-06-05

## 2022-05-29 RX ORDER — SULFAMETHOXAZOLE AND TRIMETHOPRIM 800; 160 MG/1; MG/1
1 TABLET ORAL
Status: COMPLETED | OUTPATIENT
Start: 2022-05-29 | End: 2022-05-29

## 2022-05-29 RX ADMIN — SULFAMETHOXAZOLE AND TRIMETHOPRIM 1 TABLET: 800; 160 TABLET ORAL at 02:02

## 2022-05-29 NOTE — DISCHARGE INSTRUCTIONS
Thank you! Thank you for allowing me to care for you in the emergency department. I sincerely hope that you are satisfied with your visit today. It is my goal to provide you with excellent care. Below you will find a list of your labs and imaging from your visit today. Should you have any questions regarding these results please do not hesitate to call the emergency department. Labs -     Recent Results (from the past 12 hour(s))   CBC WITH AUTOMATED DIFF    Collection Time: 05/28/22  8:36 PM   Result Value Ref Range    WBC 4.0 3.6 - 11.0 K/uL    RBC 4.12 3.80 - 5.20 M/uL    HGB 11.2 (L) 11.5 - 16.0 g/dL    HCT 35.0 35.0 - 47.0 %    MCV 85.0 80.0 - 99.0 FL    MCH 27.2 26.0 - 34.0 PG    MCHC 32.0 30.0 - 36.5 g/dL    RDW 15.1 (H) 11.5 - 14.5 %    PLATELET 148 757 - 635 K/uL    MPV 9.9 8.9 - 12.9 FL    NRBC 0.0 0.0  WBC    ABSOLUTE NRBC 0.00 0.00 - 0.01 K/uL    NEUTROPHILS 75 32 - 75 %    LYMPHOCYTES 20 12 - 49 %    MONOCYTES 5 5 - 13 %    EOSINOPHILS 0 0 - 7 %    BASOPHILS 0 0 - 1 %    IMMATURE GRANULOCYTES 0 0 - 0.5 %    ABS. NEUTROPHILS 3.0 1.8 - 8.0 K/UL    ABS. LYMPHOCYTES 0.8 0.8 - 3.5 K/UL    ABS. MONOCYTES 0.2 0.0 - 1.0 K/UL    ABS. EOSINOPHILS 0.0 0.0 - 0.4 K/UL    ABS. BASOPHILS 0.0 0.0 - 0.1 K/UL    ABS. IMM. GRANS. 0.0 0.00 - 0.04 K/UL    DF AUTOMATED     METABOLIC PANEL, COMPREHENSIVE    Collection Time: 05/28/22  8:36 PM   Result Value Ref Range    Sodium 142 136 - 145 mmol/L    Potassium 3.2 (L) 3.5 - 5.1 mmol/L    Chloride 109 (H) 97 - 108 mmol/L    CO2 29 21 - 32 mmol/L    Anion gap 4 (L) 5 - 15 mmol/L    Glucose 138 (H) 65 - 100 mg/dL    BUN 17 6 - 20 mg/dL    Creatinine 1.05 (H) 0.55 - 1.02 mg/dL    BUN/Creatinine ratio 16 12 - 20      GFR est AA >60 >60 ml/min/1.73m2    GFR est non-AA 51 (L) >60 ml/min/1.73m2    Calcium 9.6 8.5 - 10.1 mg/dL    Bilirubin, total 0.3 0.2 - 1.0 mg/dL    AST (SGOT) 23 15 - 37 U/L    ALT (SGPT) 19 12 - 78 U/L    Alk.  phosphatase 64 45 - 117 U/L Protein, total 7.2 6.4 - 8.2 g/dL    Albumin 3.4 (L) 3.5 - 5.0 g/dL    Globulin 3.8 2.0 - 4.0 g/dL    A-G Ratio 0.9 (L) 1.1 - 2.2     TROPONIN-HIGH SENSITIVITY    Collection Time: 05/28/22  8:36 PM   Result Value Ref Range    Troponin-High Sensitivity 8 0 - 51 ng/L   TROPONIN-HIGH SENSITIVITY    Collection Time: 05/28/22 10:34 PM   Result Value Ref Range    Troponin-High Sensitivity 11 0 - 51 ng/L   URINALYSIS W/MICROSCOPIC    Collection Time: 05/29/22 12:06 AM   Result Value Ref Range    Color Yellow/Straw      Appearance Turbid (A) Clear      Specific gravity 1.010 1.003 - 1.030      pH (UA) 7.0 5.0 - 8.0      Protein Negative Negative mg/dL    Glucose Negative Negative mg/dL    Ketone Negative Negative mg/dL    Bilirubin Negative Negative      Blood Small (A) Negative      Urobilinogen 0.1 0.1 - 1.0 EU/dL    Nitrites Negative Negative      Leukocyte Esterase Large (A) Negative      WBC 20-50 0 - 4 /hpf    RBC 0-5 0 - 5 /hpf    Bacteria 1+ (A) Negative /hpf    Mucus 2+ (A) Negative /lpf       Radiologic Studies -   No orders to display     CT Results  (Last 48 hours)      None          CXR Results  (Last 48 hours)      None               If you feel that you have not received excellent quality care or timely care, please ask to speak to the nurse manager. Please choose us in the future for your continued health care needs. ------------------------------------------------------------------------------------------------------------  The exam and treatment you received in the Emergency Department were for an urgent problem and are not intended as complete care. It is important that you follow-up with a doctor, nurse practitioner, or physician assistant to:  (1) confirm your diagnosis,  (2) re-evaluation of changes in your illness and treatment, and  (3) for ongoing care.   If your symptoms become worse or you do not improve as expected and you are unable to reach your usual health care provider, you should return to the Emergency Department. We are available 24 hours a day. Please take your discharge instructions with you when you go to your follow-up appointment. If a prescription has been provided, please have it filled as soon as possible to prevent a delay in treatment. Read the entire medication instruction sheet provided to you by the pharmacy. If you have any questions or reservations about taking the medication due to side effects or interactions with other medications, please call your primary care physician or contact the ER to speak with the charge nurse. Make an appointment with your family doctor or the physician you were referred to for follow-up of this visit as instructed on your discharge paperwork, as this is a mandatory follow-up. Return to the ER if you are unable to be seen or if you are unable to be seen in a timely manner. If you have any problem arranging the follow-up visit, contact the Emergency Department immediately.

## 2022-05-29 NOTE — ED PROVIDER NOTES
[id number]  EMERGENCY DEPARTMENT ENCOUNTER NOTE    Date: 5/28/2022  Patient Name: Nataliia Mccray    History of Presenting Illness     Chief Complaint   Patient presents with    Syncope     HPI: Nataliia Mccray, 80 y.o. female with a past medical history and outpatient medications as listed and reviewed below  presents for an episode of syncope. The patient was at her birthday party today when she suddenly had lightheadedness and had a syncopal episode. She denied collapse however she was noted to be lethargic. she is not unresponsive. No seizure-like activity. She was sitting down on the couch during her birthday party when the event happened. She was laid on the ground with gradual return back to baseline. Currently the patient is at baseline. She reportedly has not eaten or drank today. She denies any chest pain, palpitations, shortness of breath, headache, weakness or numbness in the upper or lower extremities, abdominal pain, nausea, or vomiting. No other complaints. On EMS arrival, her blood pressure was 93/70 and currently has improved.     Medical History   I reviewed the medical, surgical, family, and social history, as well as allergies:    PCP: Miguel Swanson MD    Past Medical History:  Past Medical History:   Diagnosis Date    [facility])     cancer of colon    Chronic pain     [facility]     Diabetic neuropathy [facility]     Hearing loss 6/15/2020    HIV (human immunodeficiency virus infection) [facility]     Hypercholesterolemia     Hypertension     Tinnitus 6/15/2020     Past Surgical History:  Past Surgical History:   Procedure Laterality Date    HX COLONOSCOPY      HX GI      Colon surgery colostomy    HX GYN      hysterectomy    [place] ABDOMEN SURGERY PROC UNLISTED      two hernia repairs     Current Outpatient Medications:  Current Outpatient Medications   Medication Instructions    acetaminophen (TYLENOL EXTRA STRENGTH) 500 mg, Oral, DAILY  amLODIPine (NORVASC) 5 mg, Oral, DAILY    atenoloL (TENORMIN) 50 mg tablet No dose, route, or frequency recorded.  atorvastatin (LIPITOR) 20 mg, Oral, DAILY    folic acid/multivit-min/lutein (CENTRUM SILVER PO) 1 Tablet, Oral, DAILY    furosemide (LASIX) 40 mg, Oral, DAILY    gabapentin (NEURONTIN) 300 mg capsule No dose, route, or frequency recorded.  hydroCHLOROthiazide (HYDRODIURIL) 25 mg tablet No dose, route, or frequency recorded.  metFORMIN (GLUCOPHAGE) 500 mg, Oral, DAILY WITH BREAKFAST    sAXagliptin (ONGLYZA) 5 mg, Oral, DAILY    trimethoprim-sulfamethoxazole (Bactrim DS) 160-800 mg per tablet 1 Tablet, Oral, 2 TIMES DAILY      Family History:  Family History   Problem Relation Age of Onset    Diabetes Mother     Cancer Father      Social History:  Social History     Tobacco Use    Smoking status: Never Smoker    Smokeless tobacco: Never Used    Tobacco comment: pt states quit more than 20 years ago   Vaping Use    Vaping Use: Never used   Substance Use Topics    Alcohol use: Never    Drug use: Never     Allergies:  No Known Allergies    Review of Systems     Review of Systems  Negative: All other systems negative. Physical Exam and Vital Signs   Vital Signs - Reviewed the patient's vital signs.     Patient Vitals for the past 12 hrs:   Temp Pulse Resp BP SpO2   05/29/22 0059  63 16 (!) 100/50 99 %   05/28/22 2300  67 16 (!) 110/51 100 %   05/28/22 2230  67  (!) 119/52 100 %   05/28/22 2200  68  (!) 113/51 100 %   05/28/22 2136  74 16 134/67 100 %   05/28/22 2133  69 16 (!) 129/58 100 %   05/28/22 2130  65 14 (!) 116/49 100 %   05/28/22 2100  65 16 (!) 123/54 100 %   05/28/22 2033 97.8 °F (36.6 °C) 66 14 (!) 111/58 100 %     Physical Exam:    GENERAL: awake, alert, cooperative, not in distress  HEENT:  * Pupils equal, EOMI  * Head atraumatic  CV:  * audible heart sounds  * warm and perfused extremities bilaterally  PULMONARY: Good air movement, no wheezes, no crackles  ABDOMEN/: soft, no distension, no guarding, no suprapubic tenderness, no abdominal tenderness  EXTREMITIES/BACK: warm and perfused, no tenderness, no edema  SKIN: no rashes or signs of trauma  NEURO:  * Speech clear  * Moves U&LE to command    Medical Decision Making   - I am the first and primary provider for this patient and am the primary provider of record. - I reviewed the vital signs, available nursing notes, past medical history, past surgical history, family history and social history. - Initial assessment performed. The patients presenting problems have been discussed, and the staff are in agreement with the care plan formulated and outlined with them. I have encouraged them to ask questions as they arise throughout their visit. - Available medical records, nursing notes, old EKGs, and EMS run sheets (if patient was EMS transported) were reviewed    MDM:   Patient is a 80 y.o. female presenting for syncope. Vitals reveal initial hypotension on route and physical exam reveals no significant abnormalities. EKG showed no significant abnormalities. Based on the history, physical exam, risk factors, and vitals signs, differential includes: Dehydration, hypovolemia, orthostasis, less likely ACS, less likely CHF. Bedside ultrasound was done and did not show any pericardial effusion however showed collapsibility of the IVC more than 75%. MAP drop of 6 units on orthostatics. Will initiate workup and symptomatic treatment. See ED Course and Reassessment for results and interpretations.     Results     Labs:  Recent Results (from the past 12 hour(s))   CBC WITH AUTOMATED DIFF    Collection Time: 05/28/22  8:36 PM   Result Value Ref Range    WBC 4.0 3.6 - 11.0 K/uL    RBC 4.12 3.80 - 5.20 M/uL    HGB 11.2 (L) 11.5 - 16.0 g/dL    HCT 35.0 35.0 - 47.0 %    MCV 85.0 80.0 - 99.0 FL    MCH 27.2 26.0 - 34.0 PG    MCHC 32.0 30.0 - 36.5 g/dL    RDW 15.1 (H) 11.5 - 14.5 %    PLATELET 413 510 - 971 K/uL    MPV 9.9 8.9 - 12.9 FL    NRBC 0.0 0.0  WBC    ABSOLUTE NRBC 0.00 0.00 - 0.01 K/uL    NEUTROPHILS 75 32 - 75 %    LYMPHOCYTES 20 12 - 49 %    MONOCYTES 5 5 - 13 %    EOSINOPHILS 0 0 - 7 %    BASOPHILS 0 0 - 1 %    IMMATURE GRANULOCYTES 0 0 - 0.5 %    ABS. NEUTROPHILS 3.0 1.8 - 8.0 K/UL    ABS. LYMPHOCYTES 0.8 0.8 - 3.5 K/UL    ABS. MONOCYTES 0.2 0.0 - 1.0 K/UL    ABS. EOSINOPHILS 0.0 0.0 - 0.4 K/UL    ABS. BASOPHILS 0.0 0.0 - 0.1 K/UL    ABS. IMM. GRANS. 0.0 0.00 - 0.04 K/UL    DF AUTOMATED     METABOLIC PANEL, COMPREHENSIVE    Collection Time: 05/28/22  8:36 PM   Result Value Ref Range    Sodium 142 136 - 145 mmol/L    Potassium 3.2 (L) 3.5 - 5.1 mmol/L    Chloride 109 (H) 97 - 108 mmol/L    CO2 29 21 - 32 mmol/L    Anion gap 4 (L) 5 - 15 mmol/L    Glucose 138 (H) 65 - 100 mg/dL    BUN 17 6 - 20 mg/dL    Creatinine 1.05 (H) 0.55 - 1.02 mg/dL    BUN/Creatinine ratio 16 12 - 20      GFR est AA >60 >60 ml/min/1.73m2    GFR est non-AA 51 (L) >60 ml/min/1.73m2    Calcium 9.6 8.5 - 10.1 mg/dL    Bilirubin, total 0.3 0.2 - 1.0 mg/dL    AST (SGOT) 23 15 - 37 U/L    ALT (SGPT) 19 12 - 78 U/L    Alk.  phosphatase 64 45 - 117 U/L    Protein, total 7.2 6.4 - 8.2 g/dL    Albumin 3.4 (L) 3.5 - 5.0 g/dL    Globulin 3.8 2.0 - 4.0 g/dL    A-G Ratio 0.9 (L) 1.1 - 2.2     TROPONIN-HIGH SENSITIVITY    Collection Time: 05/28/22  8:36 PM   Result Value Ref Range    Troponin-High Sensitivity 8 0 - 51 ng/L   TROPONIN-HIGH SENSITIVITY    Collection Time: 05/28/22 10:34 PM   Result Value Ref Range    Troponin-High Sensitivity 11 0 - 51 ng/L   URINALYSIS W/MICROSCOPIC    Collection Time: 05/29/22 12:06 AM   Result Value Ref Range    Color Yellow/Straw      Appearance Turbid (A) Clear      Specific gravity 1.010 1.003 - 1.030      pH (UA) 7.0 5.0 - 8.0      Protein Negative Negative mg/dL    Glucose Negative Negative mg/dL    Ketone Negative Negative mg/dL    Bilirubin Negative Negative      Blood Small (A) Negative      Urobilinogen 0.1 0.1 - 1.0 EU/dL    Nitrites Negative Negative      Leukocyte Esterase Large (A) Negative      WBC 20-50 0 - 4 /hpf    RBC 0-5 0 - 5 /hpf    Bacteria 1+ (A) Negative /hpf    Mucus 2+ (A) Negative /lpf     Radiologic Studies:  CT Results  (Last 48 hours)    None        CXR Results  (Last 48 hours)    None        Medications ordered:  Medications   trimethoprim-sulfamethoxazole (BACTRIM DS, SEPTRA DS) 160-800 mg per tablet 1 Tablet (has no administration in time range)   sodium chloride 0.9 % bolus infusion 1,000 mL (0 mL IntraVENous IV Completed 5/28/22 2241)   potassium chloride 10 mEq in 100 ml IVPB (0 mEq IntraVENous IV Completed 5/29/22 0022)   potassium chloride (K-DUR, KLOR-CON M20) SR tablet 40 mEq (40 mEq Oral Given 5/28/22 2258)     ED Course and Reassessment     ED Course:     ED Course as of 05/29/22 0202   Sat May 28, 2022   2201 CBC does not show any evidence of acute process. Leukocytosis not present to suggest infection. Hemoglobin not suggestive of acute anemia. Noted Hypokalemia. No other significant electrolyte derangements. Creatinine is not elevated more than baseline range making PAULINA unlikely. No significant transaminitis noted. Normal bilirubin. Trop 8, will repeat. [SS]   7943 Second troponin with negative delta change. ACS ruled out. [SS]   2336 Patient with significant improvement of symptoms. [SS]      ED Course User Index  [SS] Bola Conde MD       Reassessment:    After completion of evaluation and discussion of findings with the patient, all the patient's questions were answered. If required, all follow up appointments and treatments were discussed and explained. The patient sounded understanding and agrees with the plan. The patient understands that at this time there is no evidence for a more malignant underlying process, but the patient also understands that early in the process of an illness, an emergency department workup can be falsely reassuring.  Routine discharge counseling was given to the patient and the patient understands that worsening, changing or persistent symptoms should prompt an immediate call or follow up with their primary physician or the emergency department. The importance of appropriate follow up was also discussed with the patient. More extensive discharge instructions were given in the patient's discharge paperwork. Final Disposition     Discharge: DISCHARGED FROM EMERGENCY DEPARTMENT    Patient will be discharged from the Emergency Department in stable condition. All of the diagnostic tests were reviewed and any questions were answered. Diagnosis, results, follow up if applicable, and return precautions were discussed. I have also put together printed discharge instructions for them that include: 1) educational information regarding their diagnosis, 2) how to care for their diagnosis at home, as well a 3) list of reasons why they would want to return to the ED prior to their follow-up appointment, should their condition change. Any labs or imaging done in the ED will be either printed with the discharge paperwork or available through [address] DISCHARGE PLAN:  1. Current Discharge Medication List      START taking these medications    Details   trimethoprim-sulfamethoxazole (Bactrim DS) 160-800 mg per tablet Take 1 Tablet by mouth two (2) times a day for 7 days. Qty: 14 Tablet, Refills: 0         CONTINUE these medications which have NOT CHANGED    Details   gabapentin (NEURONTIN) 300 mg capsule       hydroCHLOROthiazide (HYDRODIURIL) 25 mg tablet       atenoloL (TENORMIN) 50 mg tablet       furosemide (LASIX) 20 mg tablet Take 2 Tablets by mouth daily. Qty: 30 Tablet, Refills: 1      folic acid/multivit-min/lutein (CENTRUM SILVER PO) Take 1 Tablet by mouth daily. acetaminophen (Tylenol Extra Strength) 500 mg tablet Take 500 mg by mouth daily. amLODIPine (NORVASC) 5 mg tablet Take 5 mg by mouth daily.       atorvastatin (LIPITOR) 20 mg tablet Take 20 mg by mouth daily. metFORMIN (GLUCOPHAGE) 500 mg tablet Take 500 mg by mouth daily (with breakfast). sAXagliptin (Onglyza) 5 mg tab tablet Take 5 mg by mouth daily. 2.   Follow-up Information     Follow up With Specialties Details Why Contact Info    Camille Treadwell MD Internal Medicine Physician Schedule an appointment as soon as possible for a visit in 3 days  [facility] [address]  [phone]      [address] [place] [address] DEPT Emergency Medicine Go to  If symptoms worsen [address] [facility] [address]  [phone]        3. Return to ED if worse    4. Current Discharge Medication List      START taking these medications    Details   trimethoprim-sulfamethoxazole (Bactrim DS) 160-800 mg per tablet Take 1 Tablet by mouth two (2) times a day for 7 days. Qty: 14 Tablet, Refills: 0  Start date: 5/29/2022, End date: 6/5/2022             ED Procedures   Performed by: Leigh Ernandez MD  Procedures     EKG interpretation (Preliminary):  Rhythm: normal sinus rhythm; and regular . Rate (approx.): 78. Axis: normal;  OH interval: normal;  QRS interval: normal ;  ST/T wave: normal;  Other findings: normal.    Diagnosis     Clinical Impression:   1. Dehydration    2. Hypovolemia    3. Orthostatic hypotension    4. Hypokalemia    5. Acute cystitis without hematuria      Attestations:    Leigh Ernandez MD    Please note that this dictation was completed with [employer], the computer voice recognition software. Quite often unanticipated grammatical, syntax, homophones, and other interpretive errors are inadvertently transcribed by the computer software. Please disregard these errors. Please excuse any errors that have escaped final proofreading. Thank you.

## 2022-05-29 NOTE — ED NOTES
Pt discharged per ED team. DC instructions and return precautions given, need for follow up reiterated, PIV removed, Rxs discussed. Ambulatory w/ steady gait out of dept.  Discharged in stable condition w/ all belongings

## 2022-06-10 ENCOUNTER — TRANSCRIBE ORDER (OUTPATIENT)
Dept: SCHEDULING | Age: 82
End: 2022-06-10

## 2022-06-10 DIAGNOSIS — I10 HTN (HYPERTENSION): ICD-10-CM

## 2022-06-10 DIAGNOSIS — B20 HIV (HUMAN IMMUNODEFICIENCY VIRUS INFECTION) (HCC): ICD-10-CM

## 2022-06-10 DIAGNOSIS — E11.9 T2DM (TYPE 2 DIABETES MELLITUS) (HCC): ICD-10-CM

## 2022-06-10 DIAGNOSIS — E78.5 HLD (HYPERLIPIDEMIA): ICD-10-CM

## 2022-06-10 DIAGNOSIS — I48.0 PAF (PAROXYSMAL ATRIAL FIBRILLATION) (HCC): ICD-10-CM

## 2022-06-10 DIAGNOSIS — R55 SYNCOPE: Primary | ICD-10-CM

## 2022-06-27 ENCOUNTER — HOSPITAL ENCOUNTER (OUTPATIENT)
Dept: NON INVASIVE DIAGNOSTICS | Age: 82
Discharge: HOME | End: 2022-06-27
Attending: INTERNAL MEDICINE
Payer: MEDICARE

## 2022-06-27 ENCOUNTER — HOSPITAL ENCOUNTER (OUTPATIENT)
Dept: NUCLEAR MEDICINE | Age: 82
Discharge: HOME | End: 2022-06-27
Attending: INTERNAL MEDICINE
Payer: MEDICARE

## 2022-06-27 VITALS
BODY MASS INDEX: 26.68 KG/M2 | DIASTOLIC BLOOD PRESSURE: 77 MMHG | WEIGHT: 170 LBS | SYSTOLIC BLOOD PRESSURE: 161 MMHG | HEIGHT: 67 IN

## 2022-06-27 DIAGNOSIS — R55 SYNCOPE: ICD-10-CM

## 2022-06-27 DIAGNOSIS — I48.0 PAF (PAROXYSMAL ATRIAL FIBRILLATION) (HCC): ICD-10-CM

## 2022-06-27 DIAGNOSIS — Z21 ASYMPTOMATIC HIV INFECTION, WITH NO HISTORY OF HIV-RELATED ILLNESS (HCC): Primary | ICD-10-CM

## 2022-06-27 DIAGNOSIS — B20 HIV (HUMAN IMMUNODEFICIENCY VIRUS INFECTION) (HCC): ICD-10-CM

## 2022-06-27 DIAGNOSIS — E78.5 HLD (HYPERLIPIDEMIA): ICD-10-CM

## 2022-06-27 DIAGNOSIS — E11.9 T2DM (TYPE 2 DIABETES MELLITUS) (HCC): ICD-10-CM

## 2022-06-27 DIAGNOSIS — I10 HTN (HYPERTENSION): ICD-10-CM

## 2022-06-27 PROCEDURE — 93017 CV STRESS TEST TRACING ONLY: CPT

## 2022-06-27 PROCEDURE — 74011250636 HC RX REV CODE- 250/636

## 2022-06-27 RX ORDER — BICTEGRAVIR SODIUM, EMTRICITABINE, AND TENOFOVIR ALAFENAMIDE FUMARATE 50; 200; 25 MG/1; MG/1; MG/1
1 TABLET ORAL DAILY
Qty: 30 TABLET | Refills: 5 | Status: SHIPPED | OUTPATIENT
Start: 2022-06-27 | End: 2022-07-27

## 2022-06-27 RX ADMIN — REGADENOSON 0.4 MG: 0.08 INJECTION, SOLUTION INTRAVENOUS at 09:01

## 2022-06-28 LAB
STRESS BASELINE DIAS BP: 77 MMHG
STRESS BASELINE HR: 71 BPM
STRESS BASELINE ST DEPRESSION: 0 MM
STRESS BASELINE SYS BP: 161 MMHG
STRESS PERCENT HR ACHIEVED: 85 %
STRESS POST PEAK HR: 119 BPM
STRESS STAGE 1 BP: NORMAL MMHG
STRESS STAGE 1 DURATION: NORMAL MIN:SEC
STRESS STAGE 1 HR: 110 BPM
STRESS STAGE 2 DURATION: NORMAL MIN:SEC
STRESS STAGE 2 HR: 100 BPM
STRESS STAGE 3 BP: NORMAL MMHG
STRESS STAGE 3 DURATION: NORMAL MIN:SEC
STRESS STAGE 3 HR: 100 BPM
STRESS STAGE 4 DURATION: NORMAL MIN:SEC
STRESS STAGE 4 HR: 107 BPM
STRESS STAGE 5 BP: NORMAL MMHG
STRESS STAGE 5 DURATION: NORMAL MIN:SEC
STRESS STAGE 5 HR: 97 BPM
STRESS TARGET HR: 140 BPM

## 2022-11-01 ENCOUNTER — OFFICE VISIT (OUTPATIENT)
Dept: INFECTIOUS DISEASES | Age: 82
End: 2022-11-01
Payer: MEDICARE

## 2022-11-01 VITALS
DIASTOLIC BLOOD PRESSURE: 78 MMHG | RESPIRATION RATE: 15 BRPM | OXYGEN SATURATION: 96 % | WEIGHT: 177 LBS | BODY MASS INDEX: 27.72 KG/M2 | TEMPERATURE: 97.6 F | SYSTOLIC BLOOD PRESSURE: 132 MMHG

## 2022-11-01 DIAGNOSIS — B20 CURRENTLY ASYMPTOMATIC HIV INFECTION, WITH HISTORY OF HIV-RELATED ILLNESS (HCC): Primary | ICD-10-CM

## 2022-11-01 DIAGNOSIS — B20 CURRENTLY ASYMPTOMATIC HIV INFECTION, WITH HISTORY OF HIV-RELATED ILLNESS (HCC): ICD-10-CM

## 2022-11-01 DIAGNOSIS — M79.89 LEG SWELLING: ICD-10-CM

## 2022-11-01 PROCEDURE — G8400 PT W/DXA NO RESULTS DOC: HCPCS | Performed by: INTERNAL MEDICINE

## 2022-11-01 PROCEDURE — G8752 SYS BP LESS 140: HCPCS | Performed by: INTERNAL MEDICINE

## 2022-11-01 PROCEDURE — G8754 DIAS BP LESS 90: HCPCS | Performed by: INTERNAL MEDICINE

## 2022-11-01 PROCEDURE — 99214 OFFICE O/P EST MOD 30 MIN: CPT | Performed by: INTERNAL MEDICINE

## 2022-11-01 PROCEDURE — G8510 SCR DEP NEG, NO PLAN REQD: HCPCS | Performed by: INTERNAL MEDICINE

## 2022-11-01 PROCEDURE — G8536 NO DOC ELDER MAL SCRN: HCPCS | Performed by: INTERNAL MEDICINE

## 2022-11-01 PROCEDURE — G8427 DOCREV CUR MEDS BY ELIG CLIN: HCPCS | Performed by: INTERNAL MEDICINE

## 2022-11-01 PROCEDURE — 1090F PRES/ABSN URINE INCON ASSESS: CPT | Performed by: INTERNAL MEDICINE

## 2022-11-01 PROCEDURE — 1101F PT FALLS ASSESS-DOCD LE1/YR: CPT | Performed by: INTERNAL MEDICINE

## 2022-11-01 PROCEDURE — G8417 CALC BMI ABV UP PARAM F/U: HCPCS | Performed by: INTERNAL MEDICINE

## 2022-11-01 RX ORDER — BICTEGRAVIR SODIUM, EMTRICITABINE, AND TENOFOVIR ALAFENAMIDE FUMARATE 50; 200; 25 MG/1; MG/1; MG/1
1 TABLET ORAL DAILY
Qty: 30 TABLET | Refills: 5 | Status: SHIPPED | OUTPATIENT
Start: 2022-11-01 | End: 2022-12-01

## 2022-11-01 NOTE — PROGRESS NOTES
Subjective  Bayron Fitzgerald    HPI: Very pleasant [de-identified] y.o BF present for a routine mgt of HIV infection. She reports doing well since her last visit in 2/2022, denies interval illness or hospitalization. She did not do lab work ordered in 2/2022 however labs in 7/2021 showed a T-cell count of 413 (29.5%), heavy RNA of 260 copies/ml. She is on maintenance therapy with Biktarvy, admits to 100% compliance, denies intolerable side effects. She has lost significant amounts of weight since her hernia surgery in 11/2021, is otherwise doing well, also notes b/l leg swelling. She denies acute complaints on assessment today and has not experienced difficulty getting her monthly refills on Biktarvy. She is is up to date w COVID 23, PNA and seasonal influenza vaccines, received though PCP. Review of Systems   Constitutional:  Positive for weight loss. Respiratory: Negative. Cardiovascular: Negative. Gastrointestinal: Negative. Genitourinary: Negative. Musculoskeletal: Negative. Skin: Negative. Neurological: Negative.       Past Medical History:   Diagnosis Date    Cancer [facility])     cancer of colon    Chronic pain     Diabetes ([facility]     Diabetic neuropathy ([facility]     Hearing loss 6/15/2020    [facility]     Hypercholesterolemia     Hypertension     Tinnitus 6/15/2020        Past Surgical History:   Procedure Laterality Date    HX COLONOSCOPY      HX GI      Colon surgery colostomy    HX GYN      hysterectomy    [place] ABDOMEN SURGERY PROC UNLISTED      two hernia repairs        Social History     Tobacco Use    Smoking status: Never    Smokeless tobacco: Never    Tobacco comments:     pt states quit more than 20 years ago   Vaping Use    Vaping Use: Never used   Substance Use Topics    Alcohol use: Never    Drug use: Never        Family History   Problem Relation Age of Onset    Diabetes Mother     Cancer Father         No Known Allergies     Objective  Physical Exam  Constitutional:       Appearance: Normal appearance. Cardiovascular:      Rate and Rhythm: Normal rate and regular rhythm. Pulmonary:      Effort: Pulmonary effort is normal.      Breath sounds: Normal breath sounds. Abdominal:      General: Abdomen is flat. Palpations: Abdomen is soft. Musculoskeletal:         General: Swelling present. Comments: B/l leg swelling, +1 pitting edema, no ulceration or open wounds    Skin:     General: Skin is warm and dry. Neurological:      Mental Status: She is alert. Assessment & Plan  Diagnoses and all orders for this visit:    1. Currently asymptomatic HIV infection, with history of HIV-related illness ([facility]  -     phposbiwm-ynoqzbit-mnwgafg ala (Biktarvy) tab tablet; Take 1 Tablet by mouth daily for 30 days.  -     LYMPHOCYTES, CD4 PERCENT AND ABSOLUTE  -     HIV-1 RNA QT BY PCR  -     METABOLIC PANEL, COMPREHENSIVE  -     RPR  -     QUANTIFERON-TB PLUS(CLIENT INCUB.); Future  - Need up dated labs, last done in 7/2021  - Refills on Biktarvy renewed, counseled on compliance   - Will reassess in 4-6 months or sooner if interim ID related problems arise     2.  Leg swelling: Chronic, may be related to Amlodipine, no evidence of acute infection on exam

## 2023-01-26 DIAGNOSIS — B20 CURRENTLY ASYMPTOMATIC HIV INFECTION, WITH HISTORY OF HIV-RELATED ILLNESS (HCC): Primary | ICD-10-CM

## 2023-01-26 RX ORDER — BICTEGRAVIR SODIUM, EMTRICITABINE, AND TENOFOVIR ALAFENAMIDE FUMARATE 50; 200; 25 MG/1; MG/1; MG/1
1 TABLET ORAL DAILY
Qty: 30 TABLET | Refills: 5 | Status: SHIPPED | OUTPATIENT
Start: 2023-01-26 | End: 2023-02-25

## 2023-02-27 ENCOUNTER — TELEPHONE (OUTPATIENT)
Dept: INFECTIOUS DISEASES | Age: 83
End: 2023-02-27

## 2023-02-27 NOTE — TELEPHONE ENCOUNTER
Pt called in stating that she needs refill for biktarvy informed pt as of 1/26/23 Dr. Miranda Reeder sent in prescription with 5 refills.  Pt will call her pharmacy

## 2023-04-26 DIAGNOSIS — Z21 ASYMPTOMATIC HIV INFECTION, WITH NO HISTORY OF HIV-RELATED ILLNESS (HCC): Primary | ICD-10-CM

## 2023-04-26 RX ORDER — BICTEGRAVIR SODIUM, EMTRICITABINE, AND TENOFOVIR ALAFENAMIDE FUMARATE 50; 200; 25 MG/1; MG/1; MG/1
1 TABLET ORAL DAILY
Qty: 30 TABLET | Refills: 5 | Status: SHIPPED | OUTPATIENT
Start: 2023-04-26 | End: 2023-05-26

## 2023-05-02 ENCOUNTER — OFFICE VISIT (OUTPATIENT)
Dept: INFECTIOUS DISEASES | Age: 83
End: 2023-05-02
Payer: MEDICARE

## 2023-05-02 VITALS
TEMPERATURE: 97.6 F | WEIGHT: 173 LBS | RESPIRATION RATE: 16 BRPM | HEIGHT: 67 IN | BODY MASS INDEX: 27.15 KG/M2 | HEART RATE: 78 BPM | OXYGEN SATURATION: 98 % | DIASTOLIC BLOOD PRESSURE: 72 MMHG | SYSTOLIC BLOOD PRESSURE: 132 MMHG

## 2023-05-02 DIAGNOSIS — M79.89 LEG SWELLING: ICD-10-CM

## 2023-05-02 DIAGNOSIS — Z21 ASYMPTOMATIC HIV INFECTION, WITH NO HISTORY OF HIV-RELATED ILLNESS (HCC): Primary | ICD-10-CM

## 2023-05-02 DIAGNOSIS — Z21 ASYMPTOMATIC HIV INFECTION, WITH NO HISTORY OF HIV-RELATED ILLNESS (HCC): ICD-10-CM

## 2023-05-02 PROCEDURE — 99214 OFFICE O/P EST MOD 30 MIN: CPT | Performed by: INTERNAL MEDICINE

## 2023-05-02 PROCEDURE — 1090F PRES/ABSN URINE INCON ASSESS: CPT | Performed by: INTERNAL MEDICINE

## 2023-05-02 PROCEDURE — G8400 PT W/DXA NO RESULTS DOC: HCPCS | Performed by: INTERNAL MEDICINE

## 2023-05-02 PROCEDURE — G8536 NO DOC ELDER MAL SCRN: HCPCS | Performed by: INTERNAL MEDICINE

## 2023-05-02 PROCEDURE — 1101F PT FALLS ASSESS-DOCD LE1/YR: CPT | Performed by: INTERNAL MEDICINE

## 2023-05-02 PROCEDURE — G8427 DOCREV CUR MEDS BY ELIG CLIN: HCPCS | Performed by: INTERNAL MEDICINE

## 2023-05-02 PROCEDURE — G8432 DEP SCR NOT DOC, RNG: HCPCS | Performed by: INTERNAL MEDICINE

## 2023-05-02 PROCEDURE — G8417 CALC BMI ABV UP PARAM F/U: HCPCS | Performed by: INTERNAL MEDICINE

## 2023-05-02 RX ORDER — HYDRALAZINE HYDROCHLORIDE 50 MG/1
25 TABLET, FILM COATED ORAL 3 TIMES DAILY
COMMUNITY

## 2023-05-02 RX ORDER — METOPROLOL SUCCINATE 25 MG/1
25 TABLET, EXTENDED RELEASE ORAL DAILY
COMMUNITY

## 2023-05-02 RX ORDER — POTASSIUM CHLORIDE 750 MG/1
10 CAPSULE, EXTENDED RELEASE ORAL 2 TIMES DAILY
COMMUNITY

## 2023-05-02 RX ORDER — LOSARTAN POTASSIUM 50 MG/1
50 TABLET ORAL DAILY
COMMUNITY

## 2023-05-17 RX ORDER — GABAPENTIN 300 MG/1
CAPSULE ORAL
COMMUNITY
Start: 2022-02-01

## 2023-05-17 RX ORDER — FUROSEMIDE 20 MG/1
40 TABLET ORAL DAILY
COMMUNITY
Start: 2021-11-02

## 2023-05-17 RX ORDER — LOSARTAN POTASSIUM 50 MG/1
50 TABLET ORAL DAILY
COMMUNITY

## 2023-05-17 RX ORDER — HYDRALAZINE HYDROCHLORIDE 50 MG/1
25 TABLET, FILM COATED ORAL 3 TIMES DAILY
COMMUNITY

## 2023-05-17 RX ORDER — ATENOLOL 50 MG/1
TABLET ORAL
COMMUNITY
Start: 2022-02-01

## 2023-05-17 RX ORDER — METOPROLOL SUCCINATE 25 MG/1
25 TABLET, EXTENDED RELEASE ORAL DAILY
COMMUNITY

## 2023-05-17 RX ORDER — ATORVASTATIN CALCIUM 20 MG/1
20 TABLET, FILM COATED ORAL DAILY
COMMUNITY

## 2023-05-17 RX ORDER — POTASSIUM CHLORIDE 750 MG/1
10 CAPSULE, EXTENDED RELEASE ORAL 2 TIMES DAILY
COMMUNITY

## 2023-05-17 RX ORDER — HYDROCHLOROTHIAZIDE 25 MG/1
TABLET ORAL
COMMUNITY
Start: 2022-04-05

## 2023-05-17 RX ORDER — BICTEGRAVIR SODIUM, EMTRICITABINE, AND TENOFOVIR ALAFENAMIDE FUMARATE 50; 200; 25 MG/1; MG/1; MG/1
1 TABLET ORAL DAILY
Qty: 30 TABLET | Refills: 0 | COMMUNITY
Start: 2023-04-26 | End: 2023-05-26

## 2023-05-17 RX ORDER — ACETAMINOPHEN 500 MG
500 TABLET ORAL DAILY
COMMUNITY

## 2023-05-17 RX ORDER — AMLODIPINE BESYLATE 5 MG/1
5 TABLET ORAL DAILY
COMMUNITY
Start: 2021-03-08

## 2023-05-30 NOTE — TELEPHONE ENCOUNTER
Requested Prescriptions     Pending Prescriptions Disp Refills    bictegravir-emtricitab-tenofovir alafenamide (BIKTARVY) -25 MG TABS per tablet 30 tablet 5     Sig: Take 1 tablet by mouth daily      Pt called in stating that medication was not sent to pharmacy she took her last pill today

## 2023-06-01 ENCOUNTER — TELEPHONE (OUTPATIENT)
Age: 83
End: 2023-06-01

## 2023-06-01 DIAGNOSIS — B20 CURRENTLY ASYMPTOMATIC HIV INFECTION, WITH HISTORY OF HIV-RELATED ILLNESS (HCC): Primary | ICD-10-CM

## 2023-06-01 DIAGNOSIS — B20 CURRENTLY ASYMPTOMATIC HIV INFECTION, WITH HISTORY OF HIV-RELATED ILLNESS (HCC): ICD-10-CM

## 2023-06-01 NOTE — TELEPHONE ENCOUNTER
Pt called in stating that she did not received her lab orders from her last appt in April.  I still don't have access to the old system could you make new orders so pt can come by to  lab orders

## 2023-07-26 LAB
ALBUMIN SERPL-MCNC: 4.2 G/DL (ref 3.7–4.7)
ALBUMIN/GLOB SERPL: 1.7 {RATIO} (ref 1.2–2.2)
ALP SERPL-CCNC: 68 IU/L (ref 44–121)
ALT SERPL-CCNC: 13 IU/L (ref 0–32)
AST SERPL-CCNC: 22 IU/L (ref 0–40)
BASOPHILS # BLD AUTO: 0 X10E3/UL (ref 0–0.2)
BASOPHILS NFR BLD AUTO: 0 %
BILIRUB SERPL-MCNC: 0.3 MG/DL (ref 0–1.2)
BUN SERPL-MCNC: 14 MG/DL (ref 8–27)
BUN/CREAT SERPL: 20 (ref 12–28)
CALCIUM SERPL-MCNC: 10 MG/DL (ref 8.7–10.3)
CD3+CD4+ CELLS # BLD: 389 /UL (ref 359–1519)
CD3+CD4+ CELLS NFR BLD: 32.4 % (ref 30.8–58.5)
CHLORIDE SERPL-SCNC: 106 MMOL/L (ref 96–106)
CO2 SERPL-SCNC: 21 MMOL/L (ref 20–29)
CREAT SERPL-MCNC: 0.7 MG/DL (ref 0.57–1)
EGFRCR SERPLBLD CKD-EPI 2021: 87 ML/MIN/1.73
EOSINOPHIL # BLD AUTO: 0 X10E3/UL (ref 0–0.4)
EOSINOPHIL NFR BLD AUTO: 1 %
ERYTHROCYTE [DISTWIDTH] IN BLOOD BY AUTOMATED COUNT: 13.9 % (ref 11.7–15.4)
GLOBULIN SER CALC-MCNC: 2.5 G/DL (ref 1.5–4.5)
GLUCOSE SERPL-MCNC: 93 MG/DL (ref 70–99)
HCT VFR BLD AUTO: 35.6 % (ref 34–46.6)
HGB BLD-MCNC: 11.6 G/DL (ref 11.1–15.9)
IMM GRANULOCYTES # BLD AUTO: 0 X10E3/UL (ref 0–0.1)
IMM GRANULOCYTES NFR BLD AUTO: 0 %
LYMPHOCYTES # BLD AUTO: 1.2 X10E3/UL (ref 0.7–3.1)
LYMPHOCYTES NFR BLD AUTO: 39 %
MCH RBC QN AUTO: 28.4 PG (ref 26.6–33)
MCHC RBC AUTO-ENTMCNC: 32.6 G/DL (ref 31.5–35.7)
MCV RBC AUTO: 87 FL (ref 79–97)
MONOCYTES # BLD AUTO: 0.3 X10E3/UL (ref 0.1–0.9)
MONOCYTES NFR BLD AUTO: 8 %
NEUTROPHILS # BLD AUTO: 1.5 X10E3/UL (ref 1.4–7)
NEUTROPHILS NFR BLD AUTO: 52 %
PLATELET # BLD AUTO: 190 X10E3/UL (ref 150–450)
POTASSIUM SERPL-SCNC: 3.6 MMOL/L (ref 3.5–5.2)
PROT SERPL-MCNC: 6.7 G/DL (ref 6–8.5)
RBC # BLD AUTO: 4.09 X10E6/UL (ref 3.77–5.28)
RPR SER QL: NON REACTIVE
SODIUM SERPL-SCNC: 140 MMOL/L (ref 134–144)
WBC # BLD AUTO: 3 X10E3/UL (ref 3.4–10.8)

## 2023-07-27 LAB
HIV1 RNA # SERPL NAA+PROBE: 20 COPIES/ML
HIV1 RNA SERPL NAA+PROBE-LOG#: 1.3 LOG10COPY/ML

## 2023-12-06 ENCOUNTER — TELEPHONE (OUTPATIENT)
Age: 83
End: 2023-12-06

## 2023-12-06 NOTE — TELEPHONE ENCOUNTER
Patient is having a problem getting her medication.  She said that it is too expensive.  She cannot afford $273 for her medication.  She wants to know if you can prescribe something cheaper.

## 2023-12-07 ENCOUNTER — TELEPHONE (OUTPATIENT)
Age: 83
End: 2023-12-07

## 2023-12-07 NOTE — TELEPHONE ENCOUNTER
Patient was returning your call, she stated she really needs to speak with concerning her prescription.  Please Advise

## 2024-01-10 ENCOUNTER — TELEPHONE (OUTPATIENT)
Age: 84
End: 2024-01-10

## 2024-01-10 NOTE — TELEPHONE ENCOUNTER
Her Biktarvy is $1000 she cannot afford that.  She said that last last month it was $273.  She wants to know if you can put her on something else or if she can get some financial assistance.

## 2024-01-23 ENCOUNTER — TELEPHONE (OUTPATIENT)
Age: 84
End: 2024-01-23

## 2024-01-23 NOTE — TELEPHONE ENCOUNTER
Hannah Colon called her pharmacy and they advised the medication is $1000.Would like to see about a different medication that's more affordable. Her call back [phone].

## 2024-01-24 ENCOUNTER — TELEPHONE (OUTPATIENT)
Age: 84
End: 2024-01-24

## 2024-01-24 NOTE — TELEPHONE ENCOUNTER
Pt's daughter wanted to inform you that she has been working on the medications for pt and should be receiving a delivery for medications by Wednesday or Thursday she just needed to speak with you about the meds as well. She can be reached at

## 2024-01-28 ENCOUNTER — HOSPITAL ENCOUNTER (EMERGENCY)
Facility: HOSPITAL | Age: 84
Discharge: HOME | End: 2024-01-28
Attending: STUDENT IN AN ORGANIZED HEALTH CARE EDUCATION/TRAINING PROGRAM
Payer: MEDICARE

## 2024-01-28 ENCOUNTER — APPOINTMENT (OUTPATIENT)
Facility: HOSPITAL | Age: 84
End: 2024-01-28
Payer: MEDICARE

## 2024-01-28 VITALS
TEMPERATURE: 97.6 F | BODY MASS INDEX: 25.43 KG/M2 | HEIGHT: 67 IN | WEIGHT: 162 LBS | RESPIRATION RATE: 16 BRPM | HEART RATE: 80 BPM | OXYGEN SATURATION: 99 % | DIASTOLIC BLOOD PRESSURE: 58 MMHG | SYSTOLIC BLOOD PRESSURE: 114 MMHG

## 2024-01-28 DIAGNOSIS — R55 SYNCOPE, UNSPECIFIED SYNCOPE TYPE: Primary | ICD-10-CM

## 2024-01-28 DIAGNOSIS — E83.42 HYPOMAGNESEMIA: ICD-10-CM

## 2024-01-28 LAB
ALBUMIN SERPL-MCNC: 3.4 G/DL (ref 3.5–5)
ALBUMIN/GLOB SERPL: 1 (ref 1.1–2.2)
ALP SERPL-CCNC: 61 U/L (ref 45–117)
ALT SERPL-CCNC: 24 U/L (ref 12–78)
ANION GAP SERPL CALC-SCNC: 6 MMOL/L (ref 5–15)
AST SERPL W P-5'-P-CCNC: 42 U/L (ref 15–37)
BASOPHILS # BLD: 0 K/UL (ref 0–0.1)
BASOPHILS NFR BLD: 0 % (ref 0–1)
BILIRUB SERPL-MCNC: 0.3 MG/DL (ref 0.2–1)
BUN SERPL-MCNC: 28 MG/DL (ref 6–20)
BUN/CREAT SERPL: 28 (ref 12–20)
CA-I BLD-MCNC: 9.5 MG/DL (ref 8.5–10.1)
CHLORIDE SERPL-SCNC: 105 MMOL/L (ref 97–108)
CO2 SERPL-SCNC: 27 MMOL/L (ref 21–32)
CREAT SERPL-MCNC: 1 MG/DL (ref 0.55–1.02)
DIFFERENTIAL METHOD BLD: ABNORMAL
EOSINOPHIL # BLD: 0 K/UL (ref 0–0.4)
EOSINOPHIL NFR BLD: 0 % (ref 0–7)
ERYTHROCYTE [DISTWIDTH] IN BLOOD BY AUTOMATED COUNT: 13.3 % (ref 11.5–14.5)
GLOBULIN SER CALC-MCNC: 3.5 G/DL (ref 2–4)
GLUCOSE SERPL-MCNC: 157 MG/DL (ref 65–100)
HCT VFR BLD AUTO: 37.2 % (ref 35–47)
HGB BLD-MCNC: 12 G/DL (ref 11.5–16)
IMM GRANULOCYTES # BLD AUTO: 0 K/UL (ref 0–0.04)
IMM GRANULOCYTES NFR BLD AUTO: 0 % (ref 0–0.5)
LYMPHOCYTES # BLD: 0.3 K/UL (ref 0.8–3.5)
LYMPHOCYTES NFR BLD: 24 % (ref 12–49)
MAGNESIUM SERPL-MCNC: 1.2 MG/DL (ref 1.6–2.4)
MCH RBC QN AUTO: 27.5 PG (ref 26–34)
MCHC RBC AUTO-ENTMCNC: 32.3 G/DL (ref 30–36.5)
MCV RBC AUTO: 85.3 FL (ref 80–99)
MONOCYTES # BLD: 0.1 K/UL (ref 0–1)
MONOCYTES NFR BLD: 5 % (ref 5–13)
NEUTS SEG # BLD: 0.9 K/UL (ref 1.8–8)
NEUTS SEG NFR BLD: 71 % (ref 32–75)
NRBC # BLD: 0 K/UL (ref 0–0.01)
NRBC BLD-RTO: 0 PER 100 WBC
PLATELET # BLD AUTO: 92 K/UL (ref 150–400)
PMV BLD AUTO: 11.5 FL (ref 8.9–12.9)
POTASSIUM SERPL-SCNC: 4.4 MMOL/L (ref 3.5–5.1)
PROT SERPL-MCNC: 6.9 G/DL (ref 6.4–8.2)
RBC # BLD AUTO: 4.36 M/UL (ref 3.8–5.2)
RBC MORPH BLD: ABNORMAL
SODIUM SERPL-SCNC: 138 MMOL/L (ref 136–145)
TROPONIN I SERPL HS-MCNC: 12 NG/L (ref 0–51)
WBC # BLD AUTO: 1.3 K/UL (ref 3.6–11)

## 2024-01-28 PROCEDURE — 84484 ASSAY OF TROPONIN QUANT: CPT

## 2024-01-28 PROCEDURE — 99285 EMERGENCY DEPT VISIT HI MDM: CPT

## 2024-01-28 PROCEDURE — 71045 X-RAY EXAM CHEST 1 VIEW: CPT

## 2024-01-28 PROCEDURE — 80053 COMPREHEN METABOLIC PANEL: CPT

## 2024-01-28 PROCEDURE — 6370000000 HC RX 637 (ALT 250 FOR IP): Performed by: STUDENT IN AN ORGANIZED HEALTH CARE EDUCATION/TRAINING PROGRAM

## 2024-01-28 PROCEDURE — 85025 COMPLETE CBC W/AUTO DIFF WBC: CPT

## 2024-01-28 PROCEDURE — 36415 COLL VENOUS BLD VENIPUNCTURE: CPT

## 2024-01-28 PROCEDURE — 93005 ELECTROCARDIOGRAM TRACING: CPT | Performed by: STUDENT IN AN ORGANIZED HEALTH CARE EDUCATION/TRAINING PROGRAM

## 2024-01-28 PROCEDURE — 83735 ASSAY OF MAGNESIUM: CPT

## 2024-01-28 PROCEDURE — 94760 N-INVAS EAR/PLS OXIMETRY 1: CPT

## 2024-01-28 RX ORDER — LANOLIN ALCOHOL/MO/W.PET/CERES
400 CREAM (GRAM) TOPICAL ONCE
Status: COMPLETED | OUTPATIENT
Start: 2024-01-28 | End: 2024-01-28

## 2024-01-28 RX ADMIN — Medication 400 MG: at 16:32

## 2024-01-28 ASSESSMENT — PAIN SCALES - GENERAL
PAINLEVEL_OUTOF10: 0
PAINLEVEL_OUTOF10: 0

## 2024-01-28 ASSESSMENT — LIFESTYLE VARIABLES
HOW MANY STANDARD DRINKS CONTAINING ALCOHOL DO YOU HAVE ON A TYPICAL DAY: PATIENT DOES NOT DRINK
HOW OFTEN DO YOU HAVE A DRINK CONTAINING ALCOHOL: NEVER

## 2024-01-28 ASSESSMENT — PAIN - FUNCTIONAL ASSESSMENT: PAIN_FUNCTIONAL_ASSESSMENT: NONE - DENIES PAIN

## 2024-01-28 NOTE — DISCHARGE INSTRUCTIONS
Thank you!  Thank you for allowing me to care for you in the emergency department. It is my goal to provide you with excellent care.  Please fill out the survey that will come to you by mail or email since we listen to your feedback!     Below you will find a list of your tests from today's visit.  Should you have any questions, please do not hesitate to call the emergency department.    Labs  Recent Results (from the past 12 hour(s))   CBC with Auto Differential    Collection Time: 01/28/24  3:05 PM   Result Value Ref Range    WBC 1.3 (L) 3.6 - 11.0 K/uL    RBC 4.36 3.80 - 5.20 M/uL    Hemoglobin 12.0 11.5 - 16.0 g/dL    Hematocrit 37.2 35.0 - 47.0 %    MCV 85.3 80.0 - 99.0 FL    MCH 27.5 26.0 - 34.0 PG    MCHC 32.3 30.0 - 36.5 g/dL    RDW 13.3 11.5 - 14.5 %    Platelets 92 (L) 150 - 400 K/uL    MPV 11.5 8.9 - 12.9 FL    Nucleated RBCs 0.0 0.0  WBC    nRBC 0.00 0.00 - 0.01 K/uL    Neutrophils % 71 32 - 75 %    Lymphocytes % 24 12 - 49 %    Monocytes % 5 5 - 13 %    Eosinophils % 0 0 - 7 %    Basophils % 0 0 - 1 %    Immature Granulocytes 0 0 - 0.5 %    Neutrophils Absolute 0.9 (L) 1.8 - 8.0 K/UL    Lymphocytes Absolute 0.3 (L) 0.8 - 3.5 K/UL    Monocytes Absolute 0.1 0.0 - 1.0 K/UL    Eosinophils Absolute 0.0 0.0 - 0.4 K/UL    Basophils Absolute 0.0 0.0 - 0.1 K/UL    Absolute Immature Granulocyte 0.0 0.00 - 0.04 K/UL    Differential Type Smear Scanned      RBC Comment Normocytic, Normochromic     Comprehensive Metabolic Panel    Collection Time: 01/28/24  3:05 PM   Result Value Ref Range    Sodium 138 136 - 145 mmol/L    Potassium 4.4 3.5 - 5.1 mmol/L    Chloride 105 97 - 108 mmol/L    CO2 27 21 - 32 mmol/L    Anion Gap 6 5 - 15 mmol/L    Glucose 157 (H) 65 - 100 mg/dL    BUN 28 (H) 6 - 20 mg/dL    Creatinine 1.00 0.55 - 1.02 mg/dL    Bun/Cre Ratio 28 (H) 12 - 20      Est, Glom Filt Rate 57 (L) >60 ml/min/1.73m2    Calcium 9.5 8.5 - 10.1 mg/dL    Total Bilirubin 0.3 0.2 - 1.0 mg/dL    AST 42 (H) 15 - 37

## 2024-01-28 NOTE — ED PROVIDER NOTES
Eosinophils Absolute 0.0 0.0 - 0.4 K/UL    Basophils Absolute 0.0 0.0 - 0.1 K/UL    Absolute Immature Granulocyte 0.0 0.00 - 0.04 K/UL    Differential Type Smear Scanned      RBC Comment Normocytic, Normochromic     Comprehensive Metabolic Panel    Collection Time: 01/28/24  3:05 PM   Result Value Ref Range    Sodium 138 136 - 145 mmol/L    Potassium 4.4 3.5 - 5.1 mmol/L    Chloride 105 97 - 108 mmol/L    CO2 27 21 - 32 mmol/L    Anion Gap 6 5 - 15 mmol/L    Glucose 157 (H) 65 - 100 mg/dL    BUN 28 (H) 6 - 20 mg/dL    Creatinine 1.00 0.55 - 1.02 mg/dL    Bun/Cre Ratio 28 (H) 12 - 20      Est, Glom Filt Rate 57 (L) >60 ml/min/1.73m2    Calcium 9.5 8.5 - 10.1 mg/dL    Total Bilirubin 0.3 0.2 - 1.0 mg/dL    AST 42 (H) 15 - 37 U/L    ALT 24 12 - 78 U/L    Alk Phosphatase 61 45 - 117 U/L    Total Protein 6.9 6.4 - 8.2 g/dL    Albumin 3.4 (L) 3.5 - 5.0 g/dL    Globulin 3.5 2.0 - 4.0 g/dL    Albumin/Globulin Ratio 1.0 (L) 1.1 - 2.2     Troponin    Collection Time: 01/28/24  3:05 PM   Result Value Ref Range    Troponin, High Sensitivity 12 0 - 51 ng/L   Magnesium    Collection Time: 01/28/24  3:05 PM   Result Value Ref Range    Magnesium 1.2 (L) 1.6 - 2.4 mg/dL       EKG: Initial EKG interpreted by me if performed. See ED course below    Radiologic Studies:  Non-plain film images such as CT, Ultrasound and MRI are read by the radiologist. Plain radiographic images are visualized and preliminarily interpreted by the ED Provider with findings available in ED course below.     Interpretation per the Radiologist below, if available at the time of this note:  XR CHEST PORTABLE   Final Result   1. Cardiac silhouette upper limits of normal. No consolidation               EMERGENCY DEPARTMENT COURSE and DIFFERENTIAL DIAGNOSIS/MDM   CC/HPI Summary, DDx, ED Course, and Reassessment:     ED Course as of 01/28/24 1701   Sun Jan 28, 2024   1600 MDM: 83-year-old female presents for evaluation of syncope.  No persistent symptoms, no

## 2024-01-29 ENCOUNTER — TELEPHONE (OUTPATIENT)
Age: 84
End: 2024-01-29

## 2024-01-29 LAB
EKG ATRIAL RATE: 80 BPM
EKG DIAGNOSIS: NORMAL
EKG P AXIS: 81 DEGREES
EKG P-R INTERVAL: 154 MS
EKG Q-T INTERVAL: 366 MS
EKG QRS DURATION: 84 MS
EKG QTC CALCULATION (BAZETT): 422 MS
EKG R AXIS: 6 DEGREES
EKG T AXIS: 53 DEGREES
EKG VENTRICULAR RATE: 80 BPM

## 2024-01-29 NOTE — TELEPHONE ENCOUNTER
Christiane (patient's daughter) called to inform you that Hannah Colon passed out last night when they were out and was sent to the ER and has been discharged. She wanted to talk to you about a program for her prescription that she has been approved for and would like to discuss next steps. Her call back number is [phone].

## 2024-01-31 NOTE — TELEPHONE ENCOUNTER
Christiane (pt's daughter) is calling back and would like to speak with you. Her call back number is [phone].

## 2024-02-27 ENCOUNTER — OFFICE VISIT (OUTPATIENT)
Age: 84
End: 2024-02-27
Payer: MEDICARE

## 2024-02-27 VITALS
SYSTOLIC BLOOD PRESSURE: 126 MMHG | BODY MASS INDEX: 25.58 KG/M2 | OXYGEN SATURATION: 97 % | TEMPERATURE: 97.6 F | RESPIRATION RATE: 18 BRPM | HEART RATE: 75 BPM | HEIGHT: 67 IN | WEIGHT: 163 LBS | DIASTOLIC BLOOD PRESSURE: 70 MMHG

## 2024-02-27 DIAGNOSIS — D72.819 LEUKOPENIA, UNSPECIFIED TYPE: ICD-10-CM

## 2024-02-27 DIAGNOSIS — Z21 ASYMPTOMATIC HIV INFECTION, WITH NO HISTORY OF HIV-RELATED ILLNESS (HCC): Primary | ICD-10-CM

## 2024-02-27 DIAGNOSIS — R55 SYNCOPE, UNSPECIFIED SYNCOPE TYPE: ICD-10-CM

## 2024-02-27 PROCEDURE — G8400 PT W/DXA NO RESULTS DOC: HCPCS | Performed by: INTERNAL MEDICINE

## 2024-02-27 PROCEDURE — 99214 OFFICE O/P EST MOD 30 MIN: CPT | Performed by: INTERNAL MEDICINE

## 2024-02-27 PROCEDURE — 1123F ACP DISCUSS/DSCN MKR DOCD: CPT | Performed by: INTERNAL MEDICINE

## 2024-02-27 PROCEDURE — G8484 FLU IMMUNIZE NO ADMIN: HCPCS | Performed by: INTERNAL MEDICINE

## 2024-02-27 PROCEDURE — G8427 DOCREV CUR MEDS BY ELIG CLIN: HCPCS | Performed by: INTERNAL MEDICINE

## 2024-02-27 PROCEDURE — 1090F PRES/ABSN URINE INCON ASSESS: CPT | Performed by: INTERNAL MEDICINE

## 2024-02-27 PROCEDURE — G8419 CALC BMI OUT NRM PARAM NOF/U: HCPCS | Performed by: INTERNAL MEDICINE

## 2024-02-27 PROCEDURE — 1036F TOBACCO NON-USER: CPT | Performed by: INTERNAL MEDICINE

## 2024-02-27 ASSESSMENT — ENCOUNTER SYMPTOMS
RESPIRATORY NEGATIVE: 1
GASTROINTESTINAL NEGATIVE: 1

## 2024-02-27 NOTE — PROGRESS NOTES
Chief Complaint   Patient presents with    Follow-up    HIV     /70 (Site: Left Upper Arm, Position: Sitting, Cuff Size: Medium Adult)   Pulse 75   Temp 97.6 °F (36.4 °C) (Oral)   Resp 18   Ht 1.702 m (5' 7\")   Wt 73.9 kg (163 lb)   SpO2 97%   BMI 25.53 kg/m²

## 2024-02-27 NOTE — PROGRESS NOTES
HIV  Pleasant 83 y.o BF presenting for a routine f/u of HIV infection. She was seen in the ED on 1/28/24 after a syncopal episode, blood work at that time revealed a WBC of 1.3. She was discharged from the ED, reports doing well since her ED visit. Pt is on Biktarvy for her HIV infection, reports inability to afford copayment over the past couple months. Pt was on Copay assistance in the past, however missed renewing application last fall and coverage was terminated. She has reapplied for Copay assistance, and is awaiting response. She missed taking Biktarvy throughout the month of 01/2024, and had to pay $1500 this month. I contacted [employer] pt drug assistance program and was informed pt does not qualify because she is enrolled w Medicare. She is otherwise doing well and denies acute complaints on assessment today. Most recent labs in 07/2023 revealed a T cell count of  389 (32.4%) and HIV RNA VL of 20 copies per ml.     ROS  Review of Systems   Constitutional: Negative.    Respiratory: Negative.     Cardiovascular: Negative.    Gastrointestinal: Negative.    Genitourinary: Negative.    Musculoskeletal: Negative.    Hematological: Negative.    Psychiatric/Behavioral: Negative.         Past Medical History:   Diagnosis Date    Cancer (HCC)     cancer of colon    Chronic pain     Diabetes (HCC)     Diabetic neuropathy (HCC)     Hearing loss 6/15/2020    HIV (human immunodeficiency virus infection) (HCC)     Hypercholesterolemia     Hypertension     Tinnitus 6/15/2020        Past Surgical History:   Procedure Laterality Date    COLONOSCOPY      GI      Colon surgery colostomy    GYN      hysterectomy    GA UNLISTED PROCEDURE ABDOMEN PERITONEUM & OMENTUM      two hernia repairs        Social History     Tobacco Use    Smoking status: Never    Smokeless tobacco: Never   Substance Use Topics    Alcohol use: Never    Drug use: Never        Family History   Problem Relation Age of Onset    Cancer Father     Diabetes

## 2024-03-01 ENCOUNTER — TELEPHONE (OUTPATIENT)
Age: 84
End: 2024-03-01

## 2024-03-01 NOTE — TELEPHONE ENCOUNTER
Belle called in for Hannah Colon to get a refill on Biktarvy. She says she only have on pill left.

## 2024-03-05 ENCOUNTER — TELEPHONE (OUTPATIENT)
Age: 84
End: 2024-03-05

## 2024-03-05 NOTE — TELEPHONE ENCOUNTER
Patient's daughter, asked that you call her back.  She did not leave any additional information on the voicemail.

## 2024-03-06 ENCOUNTER — TELEPHONE (OUTPATIENT)
Age: 84
End: 2024-03-06

## 2024-03-06 NOTE — TELEPHONE ENCOUNTER
Patient Hannah Colon's daughter Belle Gipson is asking that you give her a call pertaining to her mom. She did not tell me the nature of the call. [phone]

## 2024-04-24 ENCOUNTER — TRANSCRIBE ORDERS (OUTPATIENT)
Facility: HOSPITAL | Age: 84
End: 2024-04-24

## 2024-04-24 DIAGNOSIS — Z85.048 PERSONAL HISTORY OF RECTAL CANCER: ICD-10-CM

## 2024-04-24 DIAGNOSIS — K43.5 PARASTOMAL HERNIA OF ILEAL CONDUIT: Primary | ICD-10-CM

## 2024-04-24 DIAGNOSIS — R10.10 PAIN OF UPPER ABDOMEN: ICD-10-CM

## 2024-05-06 ENCOUNTER — HOSPITAL ENCOUNTER (OUTPATIENT)
Facility: HOSPITAL | Age: 84
Discharge: HOME | End: 2024-05-09
Attending: SURGERY
Payer: MEDICARE

## 2024-05-06 DIAGNOSIS — R10.10 PAIN OF UPPER ABDOMEN: ICD-10-CM

## 2024-05-06 DIAGNOSIS — Z85.048 PERSONAL HISTORY OF RECTAL CANCER: ICD-10-CM

## 2024-05-06 DIAGNOSIS — K43.5 PARASTOMAL HERNIA OF ILEAL CONDUIT: ICD-10-CM

## 2024-05-06 PROCEDURE — 74177 CT ABD & PELVIS W/CONTRAST: CPT

## 2024-05-06 PROCEDURE — 6360000004 HC RX CONTRAST MEDICATION: Performed by: SURGERY

## 2024-05-06 RX ADMIN — IOPAMIDOL 100 ML: 755 INJECTION, SOLUTION INTRAVENOUS at 09:51

## 2024-05-13 ENCOUNTER — HOSPITAL ENCOUNTER (OUTPATIENT)
Facility: HOSPITAL | Age: 84
Discharge: HOME | End: 2024-05-16
Payer: MEDICARE

## 2024-05-13 ENCOUNTER — TRANSCRIBE ORDERS (OUTPATIENT)
Facility: HOSPITAL | Age: 84
End: 2024-05-13

## 2024-05-13 DIAGNOSIS — C20 RECTAL CANCER (HCC): Primary | ICD-10-CM

## 2024-05-13 DIAGNOSIS — C20 RECTAL CANCER (HCC): ICD-10-CM

## 2024-05-13 LAB — CEA SERPL-MCNC: 0.8 NG/ML

## 2024-05-13 PROCEDURE — 36415 COLL VENOUS BLD VENIPUNCTURE: CPT

## 2024-05-13 PROCEDURE — 82378 CARCINOEMBRYONIC ANTIGEN: CPT

## 2024-05-23 ENCOUNTER — OFFICE VISIT (OUTPATIENT)
Age: 84
End: 2024-05-23
Payer: MEDICARE

## 2024-05-23 VITALS
TEMPERATURE: 97.3 F | HEART RATE: 82 BPM | SYSTOLIC BLOOD PRESSURE: 117 MMHG | HEIGHT: 67 IN | DIASTOLIC BLOOD PRESSURE: 67 MMHG | BODY MASS INDEX: 25.39 KG/M2 | RESPIRATION RATE: 18 BRPM | WEIGHT: 161.8 LBS | OXYGEN SATURATION: 98 %

## 2024-05-23 DIAGNOSIS — Z21 ASYMPTOMATIC HIV INFECTION, WITH NO HISTORY OF HIV-RELATED ILLNESS (HCC): Primary | ICD-10-CM

## 2024-05-23 DIAGNOSIS — Z21 ASYMPTOMATIC HIV INFECTION, WITH NO HISTORY OF HIV-RELATED ILLNESS (HCC): ICD-10-CM

## 2024-05-23 DIAGNOSIS — M79.89 LEG SWELLING: ICD-10-CM

## 2024-05-23 PROCEDURE — 99214 OFFICE O/P EST MOD 30 MIN: CPT | Performed by: INTERNAL MEDICINE

## 2024-05-23 PROCEDURE — 1090F PRES/ABSN URINE INCON ASSESS: CPT | Performed by: INTERNAL MEDICINE

## 2024-05-23 PROCEDURE — G8400 PT W/DXA NO RESULTS DOC: HCPCS | Performed by: INTERNAL MEDICINE

## 2024-05-23 PROCEDURE — 1123F ACP DISCUSS/DSCN MKR DOCD: CPT | Performed by: INTERNAL MEDICINE

## 2024-05-23 PROCEDURE — 1036F TOBACCO NON-USER: CPT | Performed by: INTERNAL MEDICINE

## 2024-05-23 PROCEDURE — G8419 CALC BMI OUT NRM PARAM NOF/U: HCPCS | Performed by: INTERNAL MEDICINE

## 2024-05-23 PROCEDURE — G8427 DOCREV CUR MEDS BY ELIG CLIN: HCPCS | Performed by: INTERNAL MEDICINE

## 2024-05-23 RX ORDER — BICTEGRAVIR SODIUM, EMTRICITABINE, AND TENOFOVIR ALAFENAMIDE FUMARATE 50; 200; 25 MG/1; MG/1; MG/1
1 TABLET ORAL DAILY
Qty: 30 TABLET | Refills: 5 | Status: SHIPPED | OUTPATIENT
Start: 2024-05-23 | End: 2024-06-22

## 2024-05-23 RX ORDER — BICTEGRAVIR SODIUM, EMTRICITABINE, AND TENOFOVIR ALAFENAMIDE FUMARATE 50; 200; 25 MG/1; MG/1; MG/1
1 TABLET ORAL DAILY
COMMUNITY

## 2024-05-23 ASSESSMENT — ENCOUNTER SYMPTOMS
GASTROINTESTINAL NEGATIVE: 1
RESPIRATORY NEGATIVE: 1

## 2024-05-23 NOTE — PROGRESS NOTES
Chief Complaint   Patient presents with    Follow-up    HIV     /67 (Site: Right Upper Arm, Position: Sitting, Cuff Size: Medium Adult)   Pulse 82   Temp 97.3 °F (36.3 °C) (Oral)   Resp 18   Ht 1.702 m (5' 7\")   Wt 73.4 kg (161 lb 12.8 oz)   SpO2 98%   BMI 25.34 kg/m²     
flat.      Palpations: Abdomen is soft.   Musculoskeletal:         General: Swelling present.      Comments: B/l leg swelling    Neurological:      Mental Status: She is alert.         Assessment & Plan  Hannah was seen today for follow-up and hiv.    Diagnoses and all orders for this visit:    Asymptomatic HIV infection, with no history of HIV-related illness (HCC)  -     bictegravir-emtricitab-tenofovir alafenamide (BIKTARVY) -25 MG TABS per tablet; Take 1 tablet by mouth daily  -     Comprehensive Metabolic Panel; Future  -     HIV-1 RNA, quantitative, PCR; Future  -     CD4 PERCENT AND ABSOLUTE; Future  -     QuantiFERON In Tube ([facility] Default); Future  -     RPR W/REFLEX TITER AND TREPONEMA ABS; Future  - Continue on Biktarvy, compliance emphasized, refills renewed   - Routine labs as above   - Needs up dated vaccines through PCP   - Reassess in 6 months     B/l Leg swelling: Chronic, likely related to Amlodipine

## 2024-05-28 DIAGNOSIS — Z21 ASYMPTOMATIC HIV INFECTION, WITH NO HISTORY OF HIV-RELATED ILLNESS (HCC): ICD-10-CM

## 2024-05-28 RX ORDER — BICTEGRAVIR SODIUM, EMTRICITABINE, AND TENOFOVIR ALAFENAMIDE FUMARATE 50; 200; 25 MG/1; MG/1; MG/1
1 TABLET ORAL DAILY
Qty: 30 TABLET | Refills: 5 | OUTPATIENT
Start: 2024-05-28

## 2024-07-16 LAB
ALBUMIN SERPL-MCNC: 4.2 G/DL (ref 3.7–4.7)
ALP SERPL-CCNC: 66 IU/L (ref 44–121)
ALT SERPL-CCNC: 12 IU/L (ref 0–32)
AST SERPL-CCNC: 23 IU/L (ref 0–40)
BASOPHILS # BLD AUTO: 0 X10E3/UL (ref 0–0.2)
BASOPHILS NFR BLD AUTO: 1 %
BILIRUB SERPL-MCNC: 0.3 MG/DL (ref 0–1.2)
BUN SERPL-MCNC: 21 MG/DL (ref 8–27)
BUN/CREAT SERPL: 31 (ref 12–28)
CALCIUM SERPL-MCNC: 10.3 MG/DL (ref 8.7–10.3)
CD3+CD4+ CELLS # BLD: 320 /UL (ref 359–1519)
CD3+CD4+ CELLS NFR BLD: 26.7 % (ref 30.8–58.5)
CHLORIDE SERPL-SCNC: 105 MMOL/L (ref 96–106)
CO2 SERPL-SCNC: 26 MMOL/L (ref 20–29)
CREAT SERPL-MCNC: 0.68 MG/DL (ref 0.57–1)
EGFRCR SERPLBLD CKD-EPI 2021: 87 ML/MIN/1.73
EOSINOPHIL # BLD AUTO: 0 X10E3/UL (ref 0–0.4)
EOSINOPHIL NFR BLD AUTO: 1 %
ERYTHROCYTE [DISTWIDTH] IN BLOOD BY AUTOMATED COUNT: 14.1 % (ref 11.7–15.4)
GLOBULIN SER CALC-MCNC: 2.7 G/DL (ref 1.5–4.5)
GLUCOSE SERPL-MCNC: 96 MG/DL (ref 70–99)
HCT VFR BLD AUTO: 34.8 % (ref 34–46.6)
HGB BLD-MCNC: 10.7 G/DL (ref 11.1–15.9)
IMM GRANULOCYTES # BLD AUTO: 0 X10E3/UL (ref 0–0.1)
IMM GRANULOCYTES NFR BLD AUTO: 0 %
LYMPHOCYTES # BLD AUTO: 1.2 X10E3/UL (ref 0.7–3.1)
LYMPHOCYTES NFR BLD AUTO: 44 %
MCH RBC QN AUTO: 26.8 PG (ref 26.6–33)
MCHC RBC AUTO-ENTMCNC: 30.7 G/DL (ref 31.5–35.7)
MCV RBC AUTO: 87 FL (ref 79–97)
MONOCYTES # BLD AUTO: 0.3 X10E3/UL (ref 0.1–0.9)
MONOCYTES NFR BLD AUTO: 9 %
NEUTROPHILS # BLD AUTO: 1.2 X10E3/UL (ref 1.4–7)
NEUTROPHILS NFR BLD AUTO: 45 %
PLATELET # BLD AUTO: 171 X10E3/UL (ref 150–450)
POTASSIUM SERPL-SCNC: 4.5 MMOL/L (ref 3.5–5.2)
PROT SERPL-MCNC: 6.9 G/DL (ref 6–8.5)
RBC # BLD AUTO: 3.99 X10E6/UL (ref 3.77–5.28)
RPR SER QL: NON REACTIVE
SODIUM SERPL-SCNC: 142 MMOL/L (ref 134–144)
WBC # BLD AUTO: 2.8 X10E3/UL (ref 3.4–10.8)

## 2024-07-17 LAB
HIV1 RNA # SERPL NAA+PROBE: 180 COPIES/ML
HIV1 RNA SERPL NAA+PROBE-LOG#: 2.25 LOG10COPY/ML

## 2024-07-18 LAB
GAMMA INTERFERON BACKGROUND BLD IA-ACNC: 0.05 IU/ML
M TB IFN-G BLD-IMP: NEGATIVE
M TB IFN-G CD4+ BCKGRND COR BLD-ACNC: 0.06 IU/ML
M TB IFN-G CD4+CD8+ BCKGRND COR BLD-ACNC: 0.05 IU/ML
MITOGEN IGNF BCKGRD COR BLD-ACNC: >10 IU/ML
QUANTIFERON, INCUBATION: NORMAL
SERVICE CMNT-IMP: NORMAL

## 2024-08-29 ENCOUNTER — HOSPITAL ENCOUNTER (OUTPATIENT)
Facility: HOSPITAL | Age: 84
Setting detail: OBSERVATION
Discharge: HOME HEALTH | End: 2024-09-01
Attending: STUDENT IN AN ORGANIZED HEALTH CARE EDUCATION/TRAINING PROGRAM | Admitting: HOSPITALIST
Payer: MEDICARE

## 2024-08-29 ENCOUNTER — APPOINTMENT (OUTPATIENT)
Facility: HOSPITAL | Age: 84
End: 2024-08-29
Payer: MEDICARE

## 2024-08-29 DIAGNOSIS — I63.40 CEREBROVASCULAR ACCIDENT (CVA) DUE TO EMBOLISM OF CEREBRAL ARTERY (HCC): ICD-10-CM

## 2024-08-29 DIAGNOSIS — R29.90 STROKE-LIKE SYMPTOMS: Primary | ICD-10-CM

## 2024-08-29 PROBLEM — E04.2 MULTIPLE THYROID NODULES: Status: ACTIVE | Noted: 2024-08-29

## 2024-08-29 PROBLEM — D32.9 MENINGIOMA (HCC): Status: ACTIVE | Noted: 2024-08-29

## 2024-08-29 LAB
ALBUMIN SERPL-MCNC: 3.8 G/DL (ref 3.5–5)
ALBUMIN/GLOB SERPL: 1.1 (ref 1.1–2.2)
ALP SERPL-CCNC: 57 U/L (ref 45–117)
ALT SERPL-CCNC: 19 U/L (ref 12–78)
ANION GAP SERPL CALC-SCNC: 10 MMOL/L (ref 5–15)
AST SERPL W P-5'-P-CCNC: 23 U/L (ref 15–37)
BASOPHILS # BLD: 0 K/UL (ref 0–0.1)
BASOPHILS NFR BLD: 0 % (ref 0–1)
BILIRUB SERPL-MCNC: 0.3 MG/DL (ref 0.2–1)
BUN SERPL-MCNC: 30 MG/DL (ref 6–20)
BUN/CREAT SERPL: 26 (ref 12–20)
CA-I BLD-MCNC: 10.1 MG/DL (ref 8.5–10.1)
CHLORIDE SERPL-SCNC: 105 MMOL/L (ref 97–108)
CO2 SERPL-SCNC: 26 MMOL/L (ref 21–32)
CREAT SERPL-MCNC: 1.14 MG/DL (ref 0.55–1.02)
DIFFERENTIAL METHOD BLD: ABNORMAL
EOSINOPHIL # BLD: 0 K/UL (ref 0–0.4)
EOSINOPHIL NFR BLD: 1 % (ref 0–7)
ERYTHROCYTE [DISTWIDTH] IN BLOOD BY AUTOMATED COUNT: 14.6 % (ref 11.5–14.5)
GLOBULIN SER CALC-MCNC: 3.4 G/DL (ref 2–4)
GLUCOSE BLD STRIP.AUTO-MCNC: 79 MG/DL (ref 65–100)
GLUCOSE BLD STRIP.AUTO-MCNC: 86 MG/DL (ref 65–100)
GLUCOSE SERPL-MCNC: 90 MG/DL (ref 65–100)
HCT VFR BLD AUTO: 33.9 % (ref 35–47)
HGB BLD-MCNC: 11.2 G/DL (ref 11.5–16)
IMM GRANULOCYTES # BLD AUTO: 0 K/UL (ref 0–0.04)
IMM GRANULOCYTES NFR BLD AUTO: 0 % (ref 0–0.5)
INR PPP: 1 (ref 0.9–1.1)
LYMPHOCYTES # BLD: 1.3 K/UL (ref 0.8–3.5)
LYMPHOCYTES NFR BLD: 43 % (ref 12–49)
MAGNESIUM SERPL-MCNC: 1 MG/DL (ref 1.6–2.4)
MCH RBC QN AUTO: 28.7 PG (ref 26–34)
MCHC RBC AUTO-ENTMCNC: 33 G/DL (ref 30–36.5)
MCV RBC AUTO: 86.9 FL (ref 80–99)
MONOCYTES # BLD: 0.3 K/UL (ref 0–1)
MONOCYTES NFR BLD: 10 % (ref 5–13)
NEUTS SEG # BLD: 1.4 K/UL (ref 1.8–8)
NEUTS SEG NFR BLD: 46 % (ref 32–75)
PERFORMED BY:: NORMAL
PERFORMED BY:: NORMAL
PHOSPHATE SERPL-MCNC: 3.8 MG/DL (ref 2.6–4.7)
PLATELET # BLD AUTO: 170 K/UL (ref 150–400)
PMV BLD AUTO: 9 FL (ref 8.9–12.9)
POTASSIUM SERPL-SCNC: 4.1 MMOL/L (ref 3.5–5.1)
PROT SERPL-MCNC: 7.2 G/DL (ref 6.4–8.2)
PROTHROMBIN TIME: 10 SEC (ref 9–11.1)
RBC # BLD AUTO: 3.9 M/UL (ref 3.8–5.2)
SODIUM SERPL-SCNC: 141 MMOL/L (ref 136–145)
TROPONIN I SERPL HS-MCNC: 8 NG/L (ref 0–51)
WBC # BLD AUTO: 3 K/UL (ref 3.6–11)

## 2024-08-29 PROCEDURE — 2580000003 HC RX 258: Performed by: HOSPITALIST

## 2024-08-29 PROCEDURE — 83540 ASSAY OF IRON: CPT

## 2024-08-29 PROCEDURE — 85610 PROTHROMBIN TIME: CPT

## 2024-08-29 PROCEDURE — 93005 ELECTROCARDIOGRAM TRACING: CPT | Performed by: STUDENT IN AN ORGANIZED HEALTH CARE EDUCATION/TRAINING PROGRAM

## 2024-08-29 PROCEDURE — 83036 HEMOGLOBIN GLYCOSYLATED A1C: CPT

## 2024-08-29 PROCEDURE — 6370000000 HC RX 637 (ALT 250 FOR IP): Performed by: HOSPITALIST

## 2024-08-29 PROCEDURE — 70498 CT ANGIOGRAPHY NECK: CPT

## 2024-08-29 PROCEDURE — 6360000004 HC RX CONTRAST MEDICATION: Performed by: STUDENT IN AN ORGANIZED HEALTH CARE EDUCATION/TRAINING PROGRAM

## 2024-08-29 PROCEDURE — 82607 VITAMIN B-12: CPT

## 2024-08-29 PROCEDURE — 96365 THER/PROPH/DIAG IV INF INIT: CPT

## 2024-08-29 PROCEDURE — 70450 CT HEAD/BRAIN W/O DYE: CPT

## 2024-08-29 PROCEDURE — 99285 EMERGENCY DEPT VISIT HI MDM: CPT

## 2024-08-29 PROCEDURE — G0378 HOSPITAL OBSERVATION PER HR: HCPCS

## 2024-08-29 PROCEDURE — 82746 ASSAY OF FOLIC ACID SERUM: CPT

## 2024-08-29 PROCEDURE — 84439 ASSAY OF FREE THYROXINE: CPT

## 2024-08-29 PROCEDURE — 85025 COMPLETE CBC W/AUTO DIFF WBC: CPT

## 2024-08-29 PROCEDURE — 2500000003 HC RX 250 WO HCPCS: Performed by: STUDENT IN AN ORGANIZED HEALTH CARE EDUCATION/TRAINING PROGRAM

## 2024-08-29 PROCEDURE — 84484 ASSAY OF TROPONIN QUANT: CPT

## 2024-08-29 PROCEDURE — 36415 COLL VENOUS BLD VENIPUNCTURE: CPT

## 2024-08-29 PROCEDURE — 83735 ASSAY OF MAGNESIUM: CPT

## 2024-08-29 PROCEDURE — 82962 GLUCOSE BLOOD TEST: CPT

## 2024-08-29 PROCEDURE — 80053 COMPREHEN METABOLIC PANEL: CPT

## 2024-08-29 PROCEDURE — 84100 ASSAY OF PHOSPHORUS: CPT

## 2024-08-29 PROCEDURE — 84443 ASSAY THYROID STIM HORMONE: CPT

## 2024-08-29 RX ORDER — GLUCAGON 1 MG/ML
1 KIT INJECTION PRN
Status: DISCONTINUED | OUTPATIENT
Start: 2024-08-29 | End: 2024-09-01 | Stop reason: HOSPADM

## 2024-08-29 RX ORDER — SODIUM CHLORIDE 9 MG/ML
INJECTION, SOLUTION INTRAVENOUS PRN
Status: DISCONTINUED | OUTPATIENT
Start: 2024-08-29 | End: 2024-09-01 | Stop reason: HOSPADM

## 2024-08-29 RX ORDER — ONDANSETRON 2 MG/ML
4 INJECTION INTRAMUSCULAR; INTRAVENOUS EVERY 6 HOURS PRN
Status: DISCONTINUED | OUTPATIENT
Start: 2024-08-29 | End: 2024-09-01 | Stop reason: HOSPADM

## 2024-08-29 RX ORDER — LABETALOL HYDROCHLORIDE 5 MG/ML
10 INJECTION, SOLUTION INTRAVENOUS EVERY 4 HOURS PRN
Status: DISCONTINUED | OUTPATIENT
Start: 2024-08-29 | End: 2024-09-01 | Stop reason: HOSPADM

## 2024-08-29 RX ORDER — ENOXAPARIN SODIUM 100 MG/ML
40 INJECTION SUBCUTANEOUS DAILY
Status: DISCONTINUED | OUTPATIENT
Start: 2024-08-30 | End: 2024-09-01 | Stop reason: HOSPADM

## 2024-08-29 RX ORDER — ONDANSETRON 4 MG/1
4 TABLET, ORALLY DISINTEGRATING ORAL EVERY 8 HOURS PRN
Status: DISCONTINUED | OUTPATIENT
Start: 2024-08-29 | End: 2024-09-01 | Stop reason: HOSPADM

## 2024-08-29 RX ORDER — IOPAMIDOL 755 MG/ML
100 INJECTION, SOLUTION INTRAVASCULAR
Status: COMPLETED | OUTPATIENT
Start: 2024-08-29 | End: 2024-08-29

## 2024-08-29 RX ORDER — SODIUM CHLORIDE 0.9 % (FLUSH) 0.9 %
5-40 SYRINGE (ML) INJECTION PRN
Status: DISCONTINUED | OUTPATIENT
Start: 2024-08-29 | End: 2024-09-01 | Stop reason: HOSPADM

## 2024-08-29 RX ORDER — ASPIRIN 300 MG/1
300 SUPPOSITORY RECTAL DAILY
Status: DISCONTINUED | OUTPATIENT
Start: 2024-08-30 | End: 2024-09-01 | Stop reason: HOSPADM

## 2024-08-29 RX ORDER — MAGNESIUM SULFATE HEPTAHYDRATE 40 MG/ML
2000 INJECTION, SOLUTION INTRAVENOUS
Status: COMPLETED | OUTPATIENT
Start: 2024-08-30 | End: 2024-08-30

## 2024-08-29 RX ORDER — ASPIRIN 81 MG/1
81 TABLET, CHEWABLE ORAL DAILY
Status: DISCONTINUED | OUTPATIENT
Start: 2024-08-30 | End: 2024-09-01 | Stop reason: HOSPADM

## 2024-08-29 RX ORDER — SODIUM CHLORIDE 0.9 % (FLUSH) 0.9 %
5-40 SYRINGE (ML) INJECTION EVERY 12 HOURS SCHEDULED
Status: DISCONTINUED | OUTPATIENT
Start: 2024-08-29 | End: 2024-09-01 | Stop reason: HOSPADM

## 2024-08-29 RX ORDER — SODIUM CHLORIDE 9 MG/ML
INJECTION, SOLUTION INTRAVENOUS CONTINUOUS
Status: DISCONTINUED | OUTPATIENT
Start: 2024-08-29 | End: 2024-08-31

## 2024-08-29 RX ORDER — POLYETHYLENE GLYCOL 3350 17 G/17G
17 POWDER, FOR SOLUTION ORAL DAILY PRN
Status: DISCONTINUED | OUTPATIENT
Start: 2024-08-29 | End: 2024-09-01 | Stop reason: HOSPADM

## 2024-08-29 RX ORDER — ATORVASTATIN CALCIUM 40 MG/1
80 TABLET, FILM COATED ORAL NIGHTLY
Status: DISCONTINUED | OUTPATIENT
Start: 2024-08-29 | End: 2024-09-01 | Stop reason: HOSPADM

## 2024-08-29 RX ORDER — INSULIN LISPRO 100 [IU]/ML
0-4 INJECTION, SOLUTION INTRAVENOUS; SUBCUTANEOUS NIGHTLY
Status: DISCONTINUED | OUTPATIENT
Start: 2024-08-29 | End: 2024-09-01 | Stop reason: HOSPADM

## 2024-08-29 RX ORDER — INSULIN LISPRO 100 [IU]/ML
0-4 INJECTION, SOLUTION INTRAVENOUS; SUBCUTANEOUS
Status: DISCONTINUED | OUTPATIENT
Start: 2024-08-30 | End: 2024-09-01 | Stop reason: HOSPADM

## 2024-08-29 RX ORDER — DEXTROSE MONOHYDRATE 100 MG/ML
INJECTION, SOLUTION INTRAVENOUS CONTINUOUS PRN
Status: DISCONTINUED | OUTPATIENT
Start: 2024-08-29 | End: 2024-09-01 | Stop reason: HOSPADM

## 2024-08-29 RX ORDER — INSULIN GLARGINE 100 [IU]/ML
0.15 INJECTION, SOLUTION SUBCUTANEOUS NIGHTLY
Status: DISCONTINUED | OUTPATIENT
Start: 2024-08-29 | End: 2024-09-01

## 2024-08-29 RX ORDER — INSULIN LISPRO 100 [IU]/ML
0.03 INJECTION, SOLUTION INTRAVENOUS; SUBCUTANEOUS
Status: DISCONTINUED | OUTPATIENT
Start: 2024-08-30 | End: 2024-09-01 | Stop reason: HOSPADM

## 2024-08-29 RX ADMIN — ATORVASTATIN CALCIUM 80 MG: 40 TABLET, FILM COATED ORAL at 21:55

## 2024-08-29 RX ADMIN — SODIUM CHLORIDE: 9 INJECTION, SOLUTION INTRAVENOUS at 22:25

## 2024-08-29 RX ADMIN — IOPAMIDOL 100 ML: 755 INJECTION, SOLUTION INTRAVENOUS at 19:00

## 2024-08-29 RX ADMIN — MAGNESIUM SULFATE HEPTAHYDRATE 2000 MG: 40 INJECTION, SOLUTION INTRAVENOUS at 23:55

## 2024-08-29 ASSESSMENT — PAIN SCALES - GENERAL: PAINLEVEL_OUTOF10: 0

## 2024-08-29 ASSESSMENT — PAIN - FUNCTIONAL ASSESSMENT
PAIN_FUNCTIONAL_ASSESSMENT: NONE - DENIES PAIN
PAIN_FUNCTIONAL_ASSESSMENT: 0-10

## 2024-08-29 NOTE — ED TRIAGE NOTES
Arrives with daughter who reported right sided arm weakness and numbness onset approx 2 hours ago. States she was writing and dropped her pen.

## 2024-08-29 NOTE — ED PROVIDER NOTES
[facility] EMERGENCY DEPT  EMERGENCY DEPARTMENT HISTORY AND PHYSICAL EXAM      Date: 8/29/2024  Patient Name: Hannah Colon  MRN: [id number]  Birthdate 1940  Date of evaluation: 8/29/2024  Provider: Robin Hinojosa MD   Note Started: 6:32 PM EDT 8/29/24    HISTORY OF PRESENT ILLNESS     Chief Complaint   Patient presents with    Extremity Weakness       History Provided By: Patient    HPI: Hannah Colon is a 84 y.o. female with PMH of DM, HTN, HLD, diabetic neuropathy, and HIV who comes to the ED after developing weakness in the right upper extremity.  Patient is right-handed and daughter noted the patient was not able to hold her pen properly.  Thus, patient was brought to the ED further evaluation.  Her weakness is isolated to the right hand.  According to the daughter this happened less than 2 hours ago.  No prior history of stroke.  Patient is not on aspirin according to the daughter.  Preceding this, patient was in her normal state of health without any recent illnesses.  There is no recent changes in her bowel, dater, habits.  She does not have the complaints of pain.    PAST MEDICAL HISTORY   Past Medical History:  Past Medical History:   Diagnosis Date    Diabetic neuropathy (HCC)     Essential hypertension     History of rectal cancer     HIV (human immunodeficiency virus infection) (HCC)     Hypercholesterolemia     S/P exploratory laparotomy 10/28/2021    Tinnitus 6/15/2020    Type 2 diabetes mellitus with complication, without long-term current use of insulin (HCC)        Past Surgical History:  Past Surgical History:   Procedure Laterality Date    COLONOSCOPY      GI      Colon surgery colostomy    GYN      hysterectomy    [place] UNLISTED PROCEDURE ABDOMEN PERITONEUM & OMENTUM      two hernia repairs       Family History:  Family History   Problem Relation Age of Onset    Cancer Father     Diabetes Mother        Social History:  Social History     Tobacco Use    Smoking status: Never    Smokeless tobacco:

## 2024-08-30 ENCOUNTER — APPOINTMENT (OUTPATIENT)
Facility: HOSPITAL | Age: 84
End: 2024-08-30
Payer: MEDICARE

## 2024-08-30 ENCOUNTER — APPOINTMENT (OUTPATIENT)
Facility: HOSPITAL | Age: 84
End: 2024-08-30
Attending: INTERNAL MEDICINE
Payer: MEDICARE

## 2024-08-30 LAB
CHOLEST SERPL-MCNC: 162 MG/DL
EKG ATRIAL RATE: 86 BPM
EKG DIAGNOSIS: NORMAL
EKG P AXIS: 37 DEGREES
EKG P-R INTERVAL: 164 MS
EKG Q-T INTERVAL: 348 MS
EKG QRS DURATION: 88 MS
EKG QTC CALCULATION (BAZETT): 416 MS
EKG R AXIS: -3 DEGREES
EKG T AXIS: 32 DEGREES
EKG VENTRICULAR RATE: 86 BPM
ERYTHROCYTE [DISTWIDTH] IN BLOOD BY AUTOMATED COUNT: 14.6 % (ref 11.5–14.5)
EST. AVERAGE GLUCOSE BLD GHB EST-MCNC: 111 MG/DL
EST. AVERAGE GLUCOSE BLD GHB EST-MCNC: 117 MG/DL
FOLATE SERPL-MCNC: 36.6 NG/ML (ref 5–21)
GLUCOSE BLD STRIP.AUTO-MCNC: 165 MG/DL (ref 65–100)
GLUCOSE BLD STRIP.AUTO-MCNC: 69 MG/DL (ref 65–100)
GLUCOSE BLD STRIP.AUTO-MCNC: 87 MG/DL (ref 65–100)
HBA1C MFR BLD: 5.5 % (ref 4–5.6)
HBA1C MFR BLD: 5.7 % (ref 4–5.6)
HCT VFR BLD AUTO: 35.6 % (ref 35–47)
HDLC SERPL-MCNC: 81 MG/DL
HDLC SERPL: 2 (ref 0–5)
HGB BLD-MCNC: 11.4 G/DL (ref 11.5–16)
IRON SATN MFR SERPL: 15 % (ref 20–50)
IRON SERPL-MCNC: 45 UG/DL (ref 35–150)
LDLC SERPL CALC-MCNC: 71.2 MG/DL (ref 0–100)
LIPID PANEL: NORMAL
MCH RBC QN AUTO: 27.6 PG (ref 26–34)
MCHC RBC AUTO-ENTMCNC: 32 G/DL (ref 30–36.5)
MCV RBC AUTO: 86.2 FL (ref 80–99)
NRBC # BLD: 0 K/UL (ref 0–0.01)
NRBC BLD-RTO: 0 PER 100 WBC
PERFORMED BY:: ABNORMAL
PERFORMED BY:: NORMAL
PERFORMED BY:: NORMAL
PLATELET # BLD AUTO: 174 K/UL (ref 150–400)
PMV BLD AUTO: 9.5 FL (ref 8.9–12.9)
RBC # BLD AUTO: 4.13 M/UL (ref 3.8–5.2)
T4 FREE SERPL-MCNC: 1.1 NG/DL (ref 0.8–1.5)
TIBC SERPL-MCNC: 307 UG/DL (ref 250–450)
TRIGL SERPL-MCNC: 49 MG/DL
TSH SERPL DL<=0.05 MIU/L-ACNC: 0.92 UIU/ML (ref 0.36–3.74)
VIT B12 SERPL-MCNC: 399 PG/ML (ref 193–986)
VLDLC SERPL CALC-MCNC: 9.8 MG/DL
WBC # BLD AUTO: 2.9 K/UL (ref 3.6–11)

## 2024-08-30 PROCEDURE — 6360000002 HC RX W HCPCS: Performed by: HOSPITALIST

## 2024-08-30 PROCEDURE — 2500000003 HC RX 250 WO HCPCS: Performed by: STUDENT IN AN ORGANIZED HEALTH CARE EDUCATION/TRAINING PROGRAM

## 2024-08-30 PROCEDURE — 85027 COMPLETE CBC AUTOMATED: CPT

## 2024-08-30 PROCEDURE — 36415 COLL VENOUS BLD VENIPUNCTURE: CPT

## 2024-08-30 PROCEDURE — G0378 HOSPITAL OBSERVATION PER HR: HCPCS

## 2024-08-30 PROCEDURE — 97166 OT EVAL MOD COMPLEX 45 MIN: CPT

## 2024-08-30 PROCEDURE — 96366 THER/PROPH/DIAG IV INF ADDON: CPT

## 2024-08-30 PROCEDURE — 97162 PT EVAL MOD COMPLEX 30 MIN: CPT

## 2024-08-30 PROCEDURE — 97530 THERAPEUTIC ACTIVITIES: CPT

## 2024-08-30 PROCEDURE — 80061 LIPID PANEL: CPT

## 2024-08-30 PROCEDURE — 2580000003 HC RX 258: Performed by: HOSPITALIST

## 2024-08-30 PROCEDURE — 96372 THER/PROPH/DIAG INJ SC/IM: CPT

## 2024-08-30 PROCEDURE — 97535 SELF CARE MNGMENT TRAINING: CPT

## 2024-08-30 PROCEDURE — 82962 GLUCOSE BLOOD TEST: CPT

## 2024-08-30 PROCEDURE — 6370000000 HC RX 637 (ALT 250 FOR IP): Performed by: HOSPITALIST

## 2024-08-30 PROCEDURE — 83036 HEMOGLOBIN GLYCOSYLATED A1C: CPT

## 2024-08-30 PROCEDURE — 6370000000 HC RX 637 (ALT 250 FOR IP): Performed by: INTERNAL MEDICINE

## 2024-08-30 PROCEDURE — 97116 GAIT TRAINING THERAPY: CPT

## 2024-08-30 PROCEDURE — 92610 EVALUATE SWALLOWING FUNCTION: CPT

## 2024-08-30 RX ORDER — HYDRALAZINE HYDROCHLORIDE 50 MG/1
50 TABLET, FILM COATED ORAL ONCE
Status: COMPLETED | OUTPATIENT
Start: 2024-08-30 | End: 2024-08-30

## 2024-08-30 RX ORDER — CYPROHEPTADINE HYDROCHLORIDE 4 MG/1
4 TABLET ORAL 2 TIMES DAILY PRN
COMMUNITY

## 2024-08-30 RX ORDER — AMLODIPINE BESYLATE 5 MG/1
5 TABLET ORAL DAILY
Status: DISCONTINUED | OUTPATIENT
Start: 2024-08-30 | End: 2024-08-30 | Stop reason: CLARIF

## 2024-08-30 RX ORDER — HYDRALAZINE HYDROCHLORIDE 50 MG/1
50 TABLET, FILM COATED ORAL 3 TIMES DAILY
Status: DISCONTINUED | OUTPATIENT
Start: 2024-08-30 | End: 2024-09-01 | Stop reason: HOSPADM

## 2024-08-30 RX ORDER — ATORVASTATIN CALCIUM 20 MG/1
20 TABLET, FILM COATED ORAL DAILY
Status: DISCONTINUED | OUTPATIENT
Start: 2024-08-30 | End: 2024-08-30 | Stop reason: SDUPTHER

## 2024-08-30 RX ORDER — LOSARTAN POTASSIUM 50 MG/1
50 TABLET ORAL 2 TIMES DAILY
Status: DISCONTINUED | OUTPATIENT
Start: 2024-08-30 | End: 2024-09-01 | Stop reason: HOSPADM

## 2024-08-30 RX ORDER — HYDRALAZINE HYDROCHLORIDE 25 MG/1
25 TABLET, FILM COATED ORAL 3 TIMES DAILY
Status: DISCONTINUED | OUTPATIENT
Start: 2024-08-30 | End: 2024-08-30

## 2024-08-30 RX ORDER — GABAPENTIN 300 MG/1
300 CAPSULE ORAL NIGHTLY
Status: DISCONTINUED | OUTPATIENT
Start: 2024-08-30 | End: 2024-09-01 | Stop reason: HOSPADM

## 2024-08-30 RX ORDER — HYDROCHLOROTHIAZIDE 25 MG/1
50 TABLET ORAL DAILY
Status: DISCONTINUED | OUTPATIENT
Start: 2024-08-30 | End: 2024-09-01 | Stop reason: HOSPADM

## 2024-08-30 RX ORDER — LOSARTAN POTASSIUM 50 MG/1
50 TABLET ORAL DAILY
Status: DISCONTINUED | OUTPATIENT
Start: 2024-08-30 | End: 2024-08-30

## 2024-08-30 RX ADMIN — HYDRALAZINE HYDROCHLORIDE 50 MG: 50 TABLET ORAL at 21:37

## 2024-08-30 RX ADMIN — ATORVASTATIN CALCIUM 80 MG: 40 TABLET, FILM COATED ORAL at 21:37

## 2024-08-30 RX ADMIN — ENOXAPARIN SODIUM 40 MG: 100 INJECTION SUBCUTANEOUS at 10:47

## 2024-08-30 RX ADMIN — GABAPENTIN 300 MG: 300 CAPSULE ORAL at 21:37

## 2024-08-30 RX ADMIN — MAGNESIUM SULFATE HEPTAHYDRATE 2000 MG: 40 INJECTION, SOLUTION INTRAVENOUS at 06:20

## 2024-08-30 RX ADMIN — HYDRALAZINE HYDROCHLORIDE 50 MG: 50 TABLET ORAL at 17:30

## 2024-08-30 RX ADMIN — SODIUM CHLORIDE, PRESERVATIVE FREE 10 ML: 5 INJECTION INTRAVENOUS at 21:38

## 2024-08-30 RX ADMIN — SODIUM CHLORIDE, PRESERVATIVE FREE 10 ML: 5 INJECTION INTRAVENOUS at 10:52

## 2024-08-30 RX ADMIN — SODIUM CHLORIDE: 9 INJECTION, SOLUTION INTRAVENOUS at 03:58

## 2024-08-30 RX ADMIN — MAGNESIUM SULFATE HEPTAHYDRATE 2000 MG: 40 INJECTION, SOLUTION INTRAVENOUS at 03:59

## 2024-08-30 RX ADMIN — INSULIN LISPRO 2 UNITS: 100 INJECTION, SOLUTION INTRAVENOUS; SUBCUTANEOUS at 13:45

## 2024-08-30 RX ADMIN — INSULIN GLARGINE 11 UNITS: 100 INJECTION, SOLUTION SUBCUTANEOUS at 21:38

## 2024-08-30 RX ADMIN — LOSARTAN POTASSIUM 50 MG: 50 TABLET, FILM COATED ORAL at 17:30

## 2024-08-30 RX ADMIN — ASPIRIN 81 MG CHEWABLE TABLET 81 MG: 81 TABLET CHEWABLE at 10:47

## 2024-08-30 RX ADMIN — SODIUM CHLORIDE, PRESERVATIVE FREE 10 ML: 5 INJECTION INTRAVENOUS at 00:01

## 2024-08-30 ASSESSMENT — PAIN SCALES - GENERAL
PAINLEVEL_OUTOF10: 0

## 2024-08-30 NOTE — ASSESSMENT & PLAN NOTE
- Rt anterior cranial fossa meningioma (1.8 x 1.7 cm) visualized on CT Brain  - Follow up MRI brain ordered with additional NS follow up pending result review

## 2024-08-30 NOTE — PROGRESS NOTES
Spiritual Health Assessment/Progress Note  [facility]    Initial Encounter,  ,  ,      Name: Hannah Colon MRN: [id number]    Age: 84 y.o.     Sex: female   Language: English   Sikhism: Samaritan   Stroke-like symptoms     Date: 8/30/2024            Total Time Calculated: 25 min              Spiritual Assessment began in [facility]        Referral/Consult From: Nurse   Encounter Overview/Reason: Initial Encounter  Service Provided For: Patient    Elen, Belief, Meaning:   Patient is connected with a elen tradition or spiritual practice and has beliefs or practices that help with coping during difficult times  Family/Friends No family/friends present      Importance and Influence:  Patient has spiritual/personal beliefs that influence decisions regarding their health  Family/Friends no family/friends present    Community:  Patient is connected with a spiritual community and feels well-supported. Support system includes: Children, [place] Community, and Extended family  Family/Friends Other: no family present    Assessment and Plan of Care:     Patient Interventions include: Facilitated expression of thoughts and feelings, Explored spiritual coping/struggle/distress and theological reflection, Affirmed coping skills/support systems, Engaged in life review and/or legacy, and Other: prayer  Family/Friends Interventions include: Other: none    Patient Plan of Care: Spiritual Care available upon further referral  Family/Friends Plan of Care: Spiritual Care available upon further referral    Electronically signed by Christine Mcclelland Chaplain Resident on 8/30/2024 at 11:20 AM

## 2024-08-30 NOTE — PROGRESS NOTES
4 Eyes Skin Assessment     NAME:  Hannah Colon  YOB: 1940  MEDICAL RECORD NUMBER:  [id number]    The patient is being assessed for  {Reason for Assessment:42540}    I agree that at least one RN has performed a thorough Head to Toe Skin Assessment on the patient. ALL assessment sites listed below have been assessed.      Areas assessed by both nurses:    {Pressure Areas Assessed:04049}        Does the Patient have a Wound? {Action Wound:53583}       Jason Prevention initiated by RN: {YES/NO:19726}  Wound Care Orders initiated by RN: {YES/NO:19726}    Pressure Injury (Stage 3,4, Unstageable, DTI, NWPT, and Complex wounds) if present, place Wound referral order by RN under : {YES/NO:19726}    New Ostomies, if present place, Ostomy referral order under : {YES/NO:19726}     Nurse 1 eSignature: {Esignature:[id number]}    **SHARE this note so that the co-signing nurse can place an eSignature**    Nurse 2 eSignature: {Esignature:[id number]}

## 2024-08-30 NOTE — ASSESSMENT & PLAN NOTE
- Acute onset Rt sided paraesthesia and weakness reported w/o evidence of acute infarct or large vessel occlusion/intracranial aneurysm noted on CT/CTA (8/29/24)  - [facility] - 1 w/ Tele Neuro evalaution completed with APT and statin initiated and follow up MRI brain ordered  - Continue neuro checks with additional follow up per Neuro

## 2024-08-30 NOTE — ASSESSMENT & PLAN NOTE
- Long term control unclear w/o acute inpt hyperglycemia noted  - Continue Wt based Lantus w/ prandial Novolog for inpt management  - Resume low CHO diet

## 2024-08-30 NOTE — H&P
V2.0  History and Physical      Name:  Hannah Colon /Age/Sex: 1940  (84 y.o. female)   MRN & CSN:  [id number] & [id number] Encounter Date/Time: 24   Location:  [place] [address] PCP: Carlie Hyde MD       Hospital Day: 1    Assessment and Plan:   Hannah Colon is a 84 y.o. female with a hx of HTN, HIV and  who presents with Stroke like symptoms    Hospital Problems             Last Modified POA    Stroke-like symptoms 2024 Yes    Meningioma (HCC) 2024 Yes    Multiple thyroid nodules 2024 Yes       Plan:  Stroke-like symptoms  - Acute onset Rt sided paraesthesia and weakness reported w/o evidence of acute infarct or large vessel occlusion/intracranial aneurysm noted on CT/CTA (24)  - [facility] - 1 w/ Tele Neuro evalaution completed with APT and statin initiated and follow up MRI brain ordered  - Continue neuro checks with additional follow up per Neuro    Multiple thyroid nodules  - Left thyroid lobe nodules (2.8 x 1.6 cm and 2.0 x 1.8 cm) noted on CT  - TFT's ordered and monitor    Meningioma (HCC)  - Rt anterior cranial fossa meningioma (1.8 x 1.7 cm) visualized on CT Brain  - Follow up MRI brain ordered with additional NS follow up pending result review    HIV (human immunodeficiency virus infection) (HCC)  - Chronic presentation w/ level of viral levels unclear  - Established Anti-viral therapy resumed   - Viral level and CD4 levels ordered    Type 2 diabetes mellitus with complication, without long-term current use of insulin (HCC)  - Long term control unclear w/o acute inpt hyperglycemia noted  - Continue Wt based Lantus w/ prandial Novolog for inpt management  - Resume low CHO diet    Disposition:   Current Living situation: Home  Expected Disposition: TBD  Estimated D/C: TBD    Diet Diet NPO   DVT Prophylaxis [x] Lovenox, []  Heparin, [] SCDs, [] Ambulation,  [] Eliquis, [] Xarelto, [] Coumadin   Code Status Full Code   Surrogate Decision Maker/ POA      Personally reviewed Lab  or aggressive osseous lesion. Multilevel degenerative changes of the cervical spine with associated at least moderate to severe left C5-C6 and at least moderate left C6-C7 neuroforaminal narrowing.     1. CTA head demonstrates no large vessel occlusion. High-grade stenosis of the right greater than left intracranial ICAs, suboptimally evaluated due to calcified plaques. No intracranial aneurysm. 2. CTA neck demonstrates no large vessel occlusion, high-grade stenosis or aneurysm. Others: 3. Right anterior cranial fossa meningioma measuring up to 1.8 x 1.7 cm. 4. Left thyroid lobe nodules measuring up to 2.8 x 1.6 cm and 2.0 x 1.8 cm clinical correlation advised and consider nonemergent follow-up thyroid ultrasound if not already performed. Electronically signed by MACK VILLEDA    CT HEAD WO CONTRAST    Result Date: 8/29/2024  EXAM: CT CODE NEURO HEAD WO CONTRAST INDICATION: Stroke COMPARISON: None. CONTRAST: None. TECHNIQUE: Unenhanced CT of the head was performed using 5 mm images. Brain and bone windows were generated. Coronal and sagittal reformats. CT dose reduction was achieved through use of a standardized protocol tailored for this examination and automatic exposure control for dose modulation.  FINDINGS: The ventricles and sulci are normal in size, shape and configuration. There is no significant white matter disease. There is no intracranial hemorrhage, extra-axial collection, or mass effect. The basilar cisterns are open. No CT evidence of acute infarct. The bone windows demonstrate no abnormalities. The visualized portions of the paranasal sinuses and mastoid air cells are clear.     No acute abnormality 16 mm high density mass floor right anterior cranial fossa compatible with a meningioma Electronically signed by Aram Sunshine    Electronically signed by LISA QUINONES DO on 8/29/2024 at 9:37 PM

## 2024-08-30 NOTE — PROGRESS NOTES
Hospitalist Progress Note               Daily Progress Note: 8/30/2024      Hospital Day: 2     Chief complaint:   Chief Complaint   Patient presents with    Extremity Weakness        Subjective:   Hospital course to date: 84-year-old pleasant black female admitted with complaints of right-sided paresthesia and weakness concerning for acute CVA.  CT of the head and CTA head and neck unremarkable.  Admitted for further neuroevaluation, MRI of the brain and echocardiogram.      --------  Patient is seen today for follow-up.  She offers no complaints.  Right-sided weakness and paresthesia has resolved.  No reports of ambulatory or gait disturbance.  No speech disturbance or swallowing difficulties.        Medications reviewed  Current Facility-Administered Medications   Medication Dose Route Frequency    0.9 % sodium chloride infusion   IntraVENous Continuous    sodium chloride flush 0.9 % injection 5-40 mL  5-40 mL IntraVENous 2 times per day    sodium chloride flush 0.9 % injection 5-40 mL  5-40 mL IntraVENous PRN    0.9 % sodium chloride infusion   IntraVENous PRN    ondansetron (ZOFRAN-ODT) disintegrating tablet 4 mg  4 mg Oral Q8H PRN    Or    ondansetron (ZOFRAN) injection 4 mg  4 mg IntraVENous Q6H PRN    polyethylene glycol (GLYCOLAX) packet 17 g  17 g Oral Daily PRN    enoxaparin (LOVENOX) injection 40 mg  40 mg SubCUTAneous Daily    atorvastatin (LIPITOR) tablet 80 mg  80 mg Oral Nightly    aspirin chewable tablet 81 mg  81 mg Oral Daily    Or    aspirin suppository 300 mg  300 mg Rectal Daily    labetalol (NORMODYNE;TRANDATE) injection 10 mg  10 mg IntraVENous Q4H PRN    glucose chewable tablet 16 g  4 tablet Oral PRN    dextrose bolus 10% 125 mL  125 mL IntraVENous PRN    Or    dextrose bolus 10% 250 mL  250 mL IntraVENous PRN    glucagon injection 1 mg  1 mg SubCUTAneous PRN    dextrose 10 % infusion   IntraVENous Continuous PRN    insulin glargine (LANTUS) injection vial 11 Units  0.15 Units/kg  400 K/uL    MPV 9.5 8.9 - 12.9 FL    Nucleated RBCs 0.0 0.0  WBC    nRBC 0.00 0.00 - 0.01 K/uL   Lipid Panel    Collection Time: 08/30/24  7:38 AM   Result Value Ref Range    LIPID PANEL        Cholesterol, Total 162 <200 mg/dL    Triglycerides 49 <150 mg/dL    HDL 81 mg/dL    LDL Cholesterol 71.2 0 - 100 mg/dL    VLDL Cholesterol Calculated 9.8 mg/dL    Chol/HDL Ratio 2.0 0.0 - 5.0     POCT Glucose    Collection Time: 08/30/24 11:17 AM   Result Value Ref Range    POC Glucose 87 65 - 100 mg/dL    Performed by: Mac Toribio        CTA HEAD NECK W CONTRAST   Final Result   1. CTA head demonstrates no large vessel occlusion. High-grade stenosis of the   right greater than left intracranial ICAs, suboptimally evaluated due to   calcified plaques. No intracranial aneurysm.   2. CTA neck demonstrates no large vessel occlusion, high-grade stenosis or   aneurysm.      Others:   3. Right anterior cranial fossa meningioma measuring up to 1.8 x 1.7 cm.      4. Left thyroid lobe nodules measuring up to 2.8 x 1.6 cm and 2.0 x 1.8 cm   clinical correlation advised and consider nonemergent follow-up thyroid   ultrasound if not already performed.         Electronically signed by MACK VILLEDA      CT HEAD WO CONTRAST   Final Result         No acute abnormality      16 mm high density mass floor right anterior cranial fossa compatible with a   meningioma      Electronically signed by Aram Sunshine      MRI brain with and without contrast    (Results Pending)          Discussion/MDM:     [] High (any 2)    A. Problems (any 1)  [] Acute/Chronic Illness/injury posing threat to life or bodily function:    [] Severe exacerbation of chronic illness:    ---------------------------------------------------------------------  B. Risk of Treatment (any 1)   [] Drugs/treatments that require intensive monitoring for toxicity include:    [] IV ABX requiring serial renal monitoring for nephrotoxicity:     [] IV Narcotic analgesia for

## 2024-08-30 NOTE — PROGRESS NOTES
Speech LAnguage Pathology Dysphagia EVALUATION/DISCHARGE    Patient: Hannah Colon (84 y.o. female)  Date: 8/30/2024  Primary Diagnosis: Stroke-like symptoms [R29.90]       Precautions:                   DIET RECOMMENDATIONS: Regular and thin liquids, meds as tolerated,    SWALLOW SAFETY PRECAUTIONS: Rec slow rate of intake, small bites/sips, effortful swallow, and remain upright 30 minutes after PO intake.       ASSESSMENT :  Based on the objective data described below, the patient presents with wfl oropharyngeal swallow fxn.  No facial droop or dysarthria. Informally, speech language is wfl. She endorses some memory loss for some time now.  Oral phase is wfl. Pharyngeal phase c/b timely swallow and HLE is wfl upon palpation.   No overt s/s of pen/asp observed.   Patient will be discharged from skilled speech-language pathology services at this time.       PLAN :  Recommendations and Planned Interventions:  DIET RECOMMENDATIONS: Regular and thin liquids, meds as tolerated,    SWALLOW SAFETY PRECAUTIONS: Rec slow rate of intake, small bites/sips, effortful swallow, and remain upright 30 minutes after PO intake.      Acute SLP Services: No, patient will be discharged from acute skilled speech-language pathology at this time.    Discharge Recommendations: No further skilled speech therapy.      SUBJECTIVE:   Patient asking for breakfast    OBJECTIVE:   Patient admitted w/ R sided paraesthesia.  CTA HEAD NECK W CONTRAST   Final Result   1. CTA head demonstrates no large vessel occlusion. High-grade stenosis of the   right greater than left intracranial ICAs, suboptimally evaluated due to   calcified plaques. No intracranial aneurysm.   2. CTA neck demonstrates no large vessel occlusion, high-grade stenosis or   aneurysm.      Others:   3. Right anterior cranial fossa meningioma measuring up to 1.8 x 1.7 cm.      4. Left thyroid lobe nodules measuring up to 2.8 x 1.6 cm and 2.0 x 1.8 cm   clinical correlation

## 2024-08-30 NOTE — ASSESSMENT & PLAN NOTE
- Left thyroid lobe nodules (2.8 x 1.6 cm and 2.0 x 1.8 cm) noted on CT  - TFT's ordered and monitor

## 2024-08-30 NOTE — PLAN OF CARE
functional limits    Range Of Motion:  AROM: Within functional limits       Coordination:  Coordination: Generally decreased, functional    Finger Opposition Finger to Nose Dysdiadokinesis Proprioception   Right UE  [] Intact    [x] Impaired   [] Intact    [x] Impaired [] Intact    [] Impaired [] Intact    [] Impaired   Left UE [x] Intact    [] Impaired [x] Intact    [] Impaired [] Intact    [] Impaired [] Intact    [] Impaired    Heel to Lopez Heel taps Trace square Proprioception   Right LE  [x] Intact    [] Impaired [] Intact    [] Impaired [] Intact    [] Impaired [] Intact    [] Impaired   Left LE [x] Intact    [] Impaired [] Intact    [] Impaired [] Intact    [] Impaired [] Intact    [] Impaired          Tone & Sensation:             Functional Mobility:  Bed Mobility:     Bed Mobility Training  Bed Mobility Training: Yes  Overall Level of Assistance: Modified independent  Supine to Sit: Modified independent  Sit to Supine: Modified independent  Scooting: Independent  Transfers:     Transfer Training  Transfer Training: Yes  Sit to Stand: Supervision  Stand to Sit: Stand-by assistance  Balance:               Balance  Sitting: Impaired  Sitting - Static: Good (unsupported)  Sitting - Dynamic: Fair (occasional)  Standing: Impaired  Standing - Static: Fair  Standing - Dynamic: Fair  Wheelchair Mobility:        Ambulation/Gait Training:                                Gait  Gait Training: Yes  Overall Level of Assistance: Contact-guard assistance;Minimum assistance  Distance (ft): 100 Feet  Assistive Device: Cane, quad                   Therapeutic Intervention provided:   bed mobility , EOB transfers, OOB transfers, amb with AD, seated static and dynamic  balance, and standing static and dynamic balance    Functional Measure:  [facility] AM-PAC™ “6 Clicks”         Basic Mobility Inpatient Short Form  How much difficulty does the patient currently have... Unable A Lot A Little None   1.  Turning over in bed

## 2024-08-30 NOTE — PLAN OF CARE
OCCUPATIONAL THERAPY EVALUATION  Patient: Hannah Colon (84 y.o. female)  Date: 8/30/2024  Primary Diagnosis: Stroke-like symptoms [R29.90]       Precautions: Fall Risk, General Precautions                Recommendations for nursing mobility: Out of bed to chair for meals and Use of BSC for toileting     In place during session:Peripheral IV and EKG/telemetry   ASSESSMENT Ms. Colon is a 84 y.o. female presenting to [facility] with c/o decreased strength Right UE, was admitted 8/29/24 and currently being treated for CVA work-up. Pt received semi-supine in bed upon arrival, A & O x 4, and agreeable to OT evaluation.     Based on current observations, pt presents with decreased  functional mobility, independence in ADLs, coordination (see below for objective details and assist levels).     Overall, she tolerated session with no signs of SOB or complaints of dizziness or headache, but her BP was high and session was stopped. She is able to engage in ADL's tasks, but with exertion, her BP is elevated - see info below for details. Pt will benefit from continued skilled OT services to address current impairments and improve IND and safety with self cares and functional transfers/mobility. Current OT d/c recommendation Intermittent occupational therapy up to 2-3x/week in previous living setting once medically appropriate.     Supine EOB Standing    /67 165/96 193/87   Heart Rate (BPM) 81      - 107     GOALS:    Problem: Occupational Therapy - Adult  Goal: By Discharge: Performs self-care activities at highest level of function for planned discharge setting.  See evaluation for individualized goals.  Description: FUNCTIONAL STATUS PRIOR TO ADMISSION:  Patient was ambulatory using no assistive devices and independently completed the following: Grooming, Bathing, Dressing, Toileting, and IADLS.    HOME SUPPORT: Daughter and Granddaughter    Occupational Therapy Goals:  Initiated 8/30/2024  Patient/Family stated goal: I want  pain)  Standing - Static: Fair  Standing - Dynamic: Fair      ADL Assessment:        Feeding: Independent       LE Dressing: Independent (with effort to don socks, did not remove socks due to increased BP.)        Functional Measure:    [facility] AM-PAC \"6 Clicks\"                                                       Daily Activity Inpatient Short Form  How much help from another person does the patient currently need... Total; A Lot A Little None   1.  Putting on and taking off regular lower body clothing? []  1 []  2 [x]  3 []  4   2.  Bathing (including washing, rinsing, drying)? []  1 []  2 [x]  3 []  4   3.  Toileting, which includes using toilet, bedpan or urinal? [] 1 []  2 [x]  3 []  4   4.  Putting on and taking off regular upper body clothing? []  1 []  2 [x]  3 []  4   5.  Taking care of personal grooming such as brushing teeth? []  1 []  2 []  3 [x]  4   6.  Eating meals? []  1 []  2 []  3 [x]  4   © 2007, Trustees of [facility], under license to [employer]. All rights reserved     Score: 20/24     Interpretation of Tool:  Represents clinically-significant functional categories (i.e. Activities of daily living).  Percentage of Impairment CH    0%   CI    1-19% CJ    20-39% CK    40-59% CL    60-79% CM    80-99% CN     100%   [facility]  Score 6-24 24 23 20-22 15-19 10-14 7-9 6     Occupational Therapy Evaluation Charge Determination   History Examination Decision-Making   MEDIUM Complexity : Expanded review of history including physical, cognitive and psychocial history  MEDIUM Complexity: 3-5 Performance deficits relating to physical, cognitive, or psychosocial skills that result in activity limitations and/or participation restrictions MEDIUM Complexity: Patient may present with comorbidities that affect occupational performance. Minimal to moderate modifications of tasks or assist (eg. physical or verbal) with assist is necessary to enable pt to complete eval      Based on the above

## 2024-08-30 NOTE — ASSESSMENT & PLAN NOTE
- Chronic presentation w/ level of viral levels unclear  - Established Anti-viral therapy resumed   - Viral level and CD4 levels ordered

## 2024-08-30 NOTE — PROGRESS NOTES
4 Eyes Skin Assessment     NAME:  Hannah Colon  YOB: 1940  MEDICAL RECORD NUMBER:  [id number]    The patient is being assessed for  Admission    I agree that at least one RN has performed a thorough Head to Toe Skin Assessment on the patient. ALL assessment sites listed below have been assessed.      Areas assessed by both nurses:    Head, Face, Ears, Shoulders, Back, Chest, Arms, Elbows, Hands, Sacrum. Buttock, Coccyx, Ischium, Legs. Feet and Heels, and Under Medical Devices         Does the Patient have a Wound? No noted wound(s)       Jason Prevention initiated by RN: No  Wound Care Orders initiated by RN: No    Pressure Injury (Stage 3,4, Unstageable, DTI, NWPT, and Complex wounds) if present, place Wound referral order by RN under : No    New Ostomies, if present place, Ostomy referral order under : No     Nurse 1 eSignature: Electronically signed by Conor Irizarry RN on 8/30/24 at 3:13 AM EDT    **SHARE this note so that the co-signing nurse can place an eSignature**    Nurse 2 eSignature: Electronically signed by Jesse Saleh RN on 8/30/24 at 5:51 AM EDT

## 2024-08-31 ENCOUNTER — APPOINTMENT (OUTPATIENT)
Facility: HOSPITAL | Age: 84
End: 2024-08-31
Attending: INTERNAL MEDICINE
Payer: MEDICARE

## 2024-08-31 ENCOUNTER — APPOINTMENT (OUTPATIENT)
Facility: HOSPITAL | Age: 84
End: 2024-08-31
Payer: MEDICARE

## 2024-08-31 LAB
GLUCOSE BLD STRIP.AUTO-MCNC: 128 MG/DL (ref 65–100)
GLUCOSE BLD STRIP.AUTO-MCNC: 84 MG/DL (ref 65–100)
GLUCOSE BLD STRIP.AUTO-MCNC: 90 MG/DL (ref 65–100)
GLUCOSE BLD STRIP.AUTO-MCNC: 98 MG/DL (ref 65–100)
PERFORMED BY:: ABNORMAL
PERFORMED BY:: NORMAL

## 2024-08-31 PROCEDURE — 96372 THER/PROPH/DIAG INJ SC/IM: CPT

## 2024-08-31 PROCEDURE — 6370000000 HC RX 637 (ALT 250 FOR IP): Performed by: HOSPITALIST

## 2024-08-31 PROCEDURE — 6360000002 HC RX W HCPCS: Performed by: HOSPITALIST

## 2024-08-31 PROCEDURE — G0378 HOSPITAL OBSERVATION PER HR: HCPCS | Performed by: STUDENT IN AN ORGANIZED HEALTH CARE EDUCATION/TRAINING PROGRAM

## 2024-08-31 PROCEDURE — 93005 ELECTROCARDIOGRAM TRACING: CPT | Performed by: INTERNAL MEDICINE

## 2024-08-31 PROCEDURE — G0378 HOSPITAL OBSERVATION PER HR: HCPCS

## 2024-08-31 PROCEDURE — 6370000000 HC RX 637 (ALT 250 FOR IP): Performed by: INTERNAL MEDICINE

## 2024-08-31 PROCEDURE — 82962 GLUCOSE BLOOD TEST: CPT

## 2024-08-31 PROCEDURE — 2580000003 HC RX 258: Performed by: HOSPITALIST

## 2024-08-31 PROCEDURE — 93306 TTE W/DOPPLER COMPLETE: CPT

## 2024-08-31 PROCEDURE — 70551 MRI BRAIN STEM W/O DYE: CPT

## 2024-08-31 PROCEDURE — 97116 GAIT TRAINING THERAPY: CPT

## 2024-08-31 RX ORDER — METOPROLOL TARTRATE 25 MG/1
25 TABLET, FILM COATED ORAL 2 TIMES DAILY
Status: DISCONTINUED | OUTPATIENT
Start: 2024-08-31 | End: 2024-09-01 | Stop reason: HOSPADM

## 2024-08-31 RX ADMIN — BICTEGRAVIR SODIUM, EMTRICITABINE, AND TENOFOVIR ALAFENAMIDE FUMARATE 1 TABLET: 50; 200; 25 TABLET ORAL at 09:14

## 2024-08-31 RX ADMIN — ASPIRIN 81 MG CHEWABLE TABLET 81 MG: 81 TABLET CHEWABLE at 09:15

## 2024-08-31 RX ADMIN — HYDRALAZINE HYDROCHLORIDE 50 MG: 50 TABLET ORAL at 14:27

## 2024-08-31 RX ADMIN — LOSARTAN POTASSIUM 50 MG: 50 TABLET, FILM COATED ORAL at 14:27

## 2024-08-31 RX ADMIN — HYDROCHLOROTHIAZIDE 50 MG: 25 TABLET ORAL at 09:15

## 2024-08-31 RX ADMIN — SODIUM CHLORIDE, PRESERVATIVE FREE 10 ML: 5 INJECTION INTRAVENOUS at 20:58

## 2024-08-31 RX ADMIN — METOPROLOL TARTRATE 25 MG: 25 TABLET, FILM COATED ORAL at 14:35

## 2024-08-31 RX ADMIN — LOSARTAN POTASSIUM 50 MG: 50 TABLET, FILM COATED ORAL at 09:15

## 2024-08-31 RX ADMIN — INSULIN LISPRO 2 UNITS: 100 INJECTION, SOLUTION INTRAVENOUS; SUBCUTANEOUS at 09:15

## 2024-08-31 RX ADMIN — HYDRALAZINE HYDROCHLORIDE 50 MG: 50 TABLET ORAL at 09:15

## 2024-08-31 RX ADMIN — ATORVASTATIN CALCIUM 80 MG: 40 TABLET, FILM COATED ORAL at 20:56

## 2024-08-31 RX ADMIN — SODIUM CHLORIDE, PRESERVATIVE FREE 10 ML: 5 INJECTION INTRAVENOUS at 09:16

## 2024-08-31 RX ADMIN — ENOXAPARIN SODIUM 40 MG: 100 INJECTION SUBCUTANEOUS at 09:15

## 2024-08-31 RX ADMIN — GABAPENTIN 300 MG: 300 CAPSULE ORAL at 20:56

## 2024-08-31 ASSESSMENT — PAIN SCALES - GENERAL
PAINLEVEL_OUTOF10: 0

## 2024-08-31 NOTE — PLAN OF CARE
Problem: Physical Therapy - Adult  Goal: By Discharge: Performs mobility at highest level of function for planned discharge setting.  See evaluation for individualized goals.  Description: FUNCTIONAL STATUS PRIOR TO ADMISSION: Patient was modified independent using a single point cane for functional mobility.    HOME SUPPORT PRIOR TO ADMISSION: The patient lived with daughter but did not require assistance.    Physical Therapy Goals  Initiated 8/30/2024  Pt stated goal: to go home  Pt will be I with LE HEP in 7 days.  Pt will perform bed mobility with [place] in 7 days.  Pt will perform transfers with [place] in 7 days.   Pt will amb 200 feet with LRAD safely with Modified [place] in 7 days.  Pt will ascend/descend 5 steps with B handrail(s) and Supervision in 7 days to safely enter/navigate home.     Outcome: Progressing            PHYSICAL THERAPY TREATMENT     Patient: Hannah Colon (84 y.o. female)  Date: 8/31/2024  Diagnosis: Stroke-like symptoms [R29.90] Stroke-like symptoms      Precautions: Fall Risk, General Precautions                      Recommendations for nursing mobility: Out of bed to chair for meals, Encourage HEP in prep for ADLs/mobility; see handout for details, and Amb in hallway    In place during session: Peripheral IV, EKG  Chart, physical therapy assessment, plan of care and goals were reviewed.  ASSESSMENT  Patient continues with skilled PT services and is progressing towards goals. Pt seated on EOB with nursing upon PT arrival, agreeable to session. Pt A&O x 4. (See below for objective details and assist levels).     Overall pt tolerated session good today with gait training performed around hallway for 250 feet x 1 with no AD and CGA for safety. She ambulates at a slow pace but no LOB or buckling noted. Returned patient to room to sit up in recliner and reviewed LE therex. Transport then present to take patient for imaging. Left patient with nurse and transport in room.  assistance;Additional time  Distance (ft): 250 Feet  Assistive Device: Gait belt  Speed/Maryann: Slow;Shuffled    Therapeutic Exercises:   Reviewed LE therex, good recall of tasks       EXERCISE   Sets   Reps   Active Active Assist   Passive Self ROM   Comments   Ankle Pumps   [] [] [] []    Quad Sets/Glut Sets   [] [] [] []    Hamstring Sets   [] [] [] []    Short Arc Quads   [] [] [] []    Heel Slides   [] [] [] []    Straight Leg Raises   [] [] [] []    Hip abd/add   [] [] [] []    Long Arc Quads 1 10 [x] [] [] []    Marching 1 10 [x] [] [] []    Seated HR/TR 1 10 [x] [] [] []       [] [] [] []       Pain Ratin/10 reported    Activity Tolerance:   Good    After treatment patient left in no apparent distress:   Bed locked and returned to lowest position, Patient left in no apparent distress sitting up in chair and Call bell within reach, and nsg updated     COMMUNICATION/COLLABORATION:   The patient’s plan of care was discussed with: Physical therapist, Registered nurse, and Physician    Patient Education  Education Given To: Patient  Education Provided: Role of Therapy;Plan of Care;Home Exercise Program  Education Method: Demonstration;Verbal;Teach Back  Barriers to Learning: None  Education Outcome: Verbalized understanding;Demonstrated understanding;Continued education needed    Malena Christina PTA  Minutes: 15

## 2024-08-31 NOTE — PLAN OF CARE
Problem: Discharge Planning  Goal: Discharge to home or other facility with appropriate resources  Outcome: Progressing     Problem: Safety - Adult  Goal: Free from fall injury  Outcome: Progressing     Problem: Physical Therapy - Adult  Goal: By Discharge: Performs mobility at highest level of function for planned discharge setting.  See evaluation for individualized goals.  Description: FUNCTIONAL STATUS PRIOR TO ADMISSION: Patient was modified independent using a single point cane for functional mobility.    HOME SUPPORT PRIOR TO ADMISSION: The patient lived with daughter but did not require assistance.    Physical Therapy Goals  Initiated 8/30/2024  Pt stated goal: to go home  Pt will be I with LE HEP in 7 days.  Pt will perform bed mobility with [place] in 7 days.  Pt will perform transfers with [place] in 7 days.   Pt will amb 200 feet with LRAD safely with Modified [place] in 7 days.  Pt will ascend/descend 5 steps with B handrail(s) and Supervision in 7 days to safely enter/navigate home.     8/30/2024 1430 by Vanessa Lubin, ERROL  Outcome: Progressing     Problem: Occupational Therapy - Adult  Goal: By Discharge: Performs self-care activities at highest level of function for planned discharge setting.  See evaluation for individualized goals.  Description: FUNCTIONAL STATUS PRIOR TO ADMISSION:  Patient was ambulatory using no assistive devices and independently completed the following: Grooming, Bathing, Dressing, Toileting, and IADLS.    HOME SUPPORT: Daughter and Granddaughter    Occupational Therapy Goals:  Initiated 8/30/2024  Patient/Family stated goal: I want to get back home!  1.  Patient will perform lower body dressing with [place] and Additional Time with her BP ranging in the 140's/80's or less within 7 day(s)..  2.  Patient will perform all aspects of toileting with [place] and Additional Time with her BP ranging in the 140's/80's or less within 7 day(s).  3.  Patient

## 2024-08-31 NOTE — PROGRESS NOTES
1420: Notified by tele, pt's  while in echo walking with 3Nod. Pt returned from echo,  /87, pt given scheduled hydralazine and losartan. Pt asymptomatic, Dr. Steven notified. Orders received for metoprolol and EKG.

## 2024-08-31 NOTE — PROGRESS NOTES
Hospitalist Progress Note               Daily Progress Note: 8/31/2024      Hospital Day: 3     Chief complaint:   Chief Complaint   Patient presents with    Extremity Weakness        Subjective:   Hospital course to date: 84-year-old pleasant black female admitted with complaints of right-sided paresthesia and weakness concerning for acute CVA.  CT of the head and CTA head and neck unremarkable.  Admitted for further neuroevaluation, MRI of the brain and echocardiogram.      --------  Patient is seen today for follow-up.  She offers no complaints.  Right-sided weakness and paresthesia has resolved.  No reports of ambulatory or gait disturbance.  No speech disturbance or swallowing difficulties.  MRI brain completed this morning and results pending.  She was noted with heart rate in the 130s while at echo.  Heart rate down to 120s.  Twelve-lead EKG pending.        Medications reviewed  Current Facility-Administered Medications   Medication Dose Route Frequency    metoprolol tartrate (LOPRESSOR) tablet 25 mg  25 mg Oral BID    bictegravir-emtricitab-tenofovir alafenamide (BIKTARVY) -25 MG per tablet 1 tablet  1 tablet Oral Daily    gabapentin (NEURONTIN) capsule 300 mg  300 mg Oral Nightly    hydrALAZINE (APRESOLINE) tablet 50 mg  50 mg Oral TID    losartan (COZAAR) tablet 50 mg  50 mg Oral BID    hydroCHLOROthiazide (HYDRODIURIL) tablet 50 mg  50 mg Oral Daily    0.9 % sodium chloride infusion   IntraVENous Continuous    sodium chloride flush 0.9 % injection 5-40 mL  5-40 mL IntraVENous 2 times per day    sodium chloride flush 0.9 % injection 5-40 mL  5-40 mL IntraVENous PRN    0.9 % sodium chloride infusion   IntraVENous PRN    ondansetron (ZOFRAN-ODT) disintegrating tablet 4 mg  4 mg Oral Q8H PRN    Or    ondansetron (ZOFRAN) injection 4 mg  4 mg IntraVENous Q6H PRN    polyethylene glycol (GLYCOLAX) packet 17 g  17 g Oral Daily PRN    enoxaparin (LOVENOX) injection 40 mg  40 mg SubCUTAneous Daily

## 2024-09-01 VITALS
BODY MASS INDEX: 24.2 KG/M2 | SYSTOLIC BLOOD PRESSURE: 139 MMHG | HEIGHT: 67 IN | WEIGHT: 154.2 LBS | OXYGEN SATURATION: 100 % | RESPIRATION RATE: 18 BRPM | HEART RATE: 78 BPM | TEMPERATURE: 98.1 F | DIASTOLIC BLOOD PRESSURE: 68 MMHG

## 2024-09-01 LAB
ECHO AV AREA PEAK VELOCITY: 2 CM2
ECHO AV AREA VTI: 2.1 CM2
ECHO AV AREA/BSA PEAK VELOCITY: 1.1 CM2/M2
ECHO AV AREA/BSA VTI: 1.2 CM2/M2
ECHO AV MEAN GRADIENT: 8 MMHG
ECHO AV MEAN VELOCITY: 1.2 M/S
ECHO AV PEAK GRADIENT: 16 MMHG
ECHO AV PEAK VELOCITY: 2 M/S
ECHO AV VELOCITY RATIO: 0.5
ECHO AV VTI: 38.5 CM
ECHO BSA: 1.75 M2
ECHO EST RA PRESSURE: 3 MMHG
ECHO LA AREA 2C: 19.7 CM2
ECHO LA AREA 4C: 12.2 CM2
ECHO LA DIAMETER INDEX: 2 CM/M2
ECHO LA DIAMETER: 3.5 CM
ECHO LA MAJOR AXIS: 4.5 CM
ECHO LA MINOR AXIS: 5.9 CM
ECHO LA VOL MOD A2C: 54 ML (ref 22–52)
ECHO LA VOL MOD A4C: 30 ML (ref 22–52)
ECHO LA VOLUME INDEX MOD A2C: 31 ML/M2 (ref 16–34)
ECHO LA VOLUME INDEX MOD A4C: 17 ML/M2 (ref 16–34)
ECHO LV E' LATERAL VELOCITY: 7 CM/S
ECHO LV E' SEPTAL VELOCITY: 5 CM/S
ECHO LV EDV A2C: 39 ML
ECHO LV EDV A4C: 51 ML
ECHO LV EDV INDEX A4C: 29 ML/M2
ECHO LV EDV NDEX A2C: 22 ML/M2
ECHO LV EJECTION FRACTION A2C: 60 %
ECHO LV EJECTION FRACTION A4C: 82 %
ECHO LV EJECTION FRACTION BIPLANE: 71 % (ref 55–100)
ECHO LV ESV A2C: 16 ML
ECHO LV ESV A4C: 9 ML
ECHO LV ESV INDEX A2C: 9 ML/M2
ECHO LV ESV INDEX A4C: 5 ML/M2
ECHO LV FRACTIONAL SHORTENING: 28 % (ref 28–44)
ECHO LV INTERNAL DIMENSION DIASTOLE INDEX: 2.29 CM/M2
ECHO LV INTERNAL DIMENSION DIASTOLIC: 4 CM (ref 3.9–5.3)
ECHO LV INTERNAL DIMENSION SYSTOLIC INDEX: 1.66 CM/M2
ECHO LV INTERNAL DIMENSION SYSTOLIC: 2.9 CM
ECHO LV IVSD: 0.9 CM (ref 0.6–0.9)
ECHO LV MASS 2D: 118.2 G (ref 67–162)
ECHO LV MASS INDEX 2D: 67.6 G/M2 (ref 43–95)
ECHO LV POSTERIOR WALL DIASTOLIC: 1 CM (ref 0.6–0.9)
ECHO LV RELATIVE WALL THICKNESS RATIO: 0.5
ECHO LVOT AREA: 3.8 CM2
ECHO LVOT AV VTI INDEX: 0.55
ECHO LVOT DIAM: 2.2 CM
ECHO LVOT MEAN GRADIENT: 2 MMHG
ECHO LVOT PEAK GRADIENT: 4 MMHG
ECHO LVOT PEAK VELOCITY: 1 M/S
ECHO LVOT STROKE VOLUME INDEX: 46.2 ML/M2
ECHO LVOT SV: 80.9 ML
ECHO LVOT VTI: 21.3 CM
ECHO MV A VELOCITY: 0.93 M/S
ECHO MV E VELOCITY: 0.81 M/S
ECHO MV E/A RATIO: 0.87
ECHO MV E/E' LATERAL: 11.57
ECHO MV E/E' RATIO (AVERAGED): 13.89
ECHO MV E/E' SEPTAL: 16.2
ECHO MV REGURGITANT PEAK GRADIENT: 29 MMHG
ECHO MV REGURGITANT PEAK VELOCITY: 2.7 M/S
ECHO MV REGURGITANT VTIA: 61.6 CM
ECHO PV MAX VELOCITY: 0.7 M/S
ECHO PV MEAN GRADIENT: 1 MMHG
ECHO PV MEAN VELOCITY: 0.5 M/S
ECHO PV PEAK GRADIENT: 2 MMHG
ECHO PV VTI: 12.9 CM
ECHO RA AREA 4C: 9.7 CM2
ECHO RA END SYSTOLIC VOLUME APICAL 4 CHAMBER INDEX BSA: 29 ML/M2
ECHO RA VOLUME: 51 ML
ECHO RIGHT VENTRICULAR SYSTOLIC PRESSURE (RVSP): 39 MMHG
ECHO RV BASAL DIMENSION: 3.2 CM
ECHO RV FREE WALL PEAK S': 28 CM/S
ECHO RV MID DIMENSION: 2 CM
ECHO RV TAPSE: 2.2 CM (ref 1.7–?)
ECHO TV REGURGITANT MAX VELOCITY: 2.98 M/S
ECHO TV REGURGITANT PEAK GRADIENT: 36 MMHG
EKG ATRIAL RATE: 91 BPM
EKG DIAGNOSIS: NORMAL
EKG P AXIS: 52 DEGREES
EKG P-R INTERVAL: 148 MS
EKG Q-T INTERVAL: 354 MS
EKG QRS DURATION: 88 MS
EKG QTC CALCULATION (BAZETT): 435 MS
EKG R AXIS: -11 DEGREES
EKG T AXIS: 20 DEGREES
EKG VENTRICULAR RATE: 91 BPM
GLUCOSE BLD STRIP.AUTO-MCNC: 110 MG/DL (ref 65–100)
GLUCOSE BLD STRIP.AUTO-MCNC: 142 MG/DL (ref 65–100)
GLUCOSE BLD STRIP.AUTO-MCNC: 87 MG/DL (ref 65–100)
PERFORMED BY:: ABNORMAL
PERFORMED BY:: ABNORMAL
PERFORMED BY:: NORMAL

## 2024-09-01 PROCEDURE — 6370000000 HC RX 637 (ALT 250 FOR IP): Performed by: HOSPITALIST

## 2024-09-01 PROCEDURE — 82962 GLUCOSE BLOOD TEST: CPT

## 2024-09-01 PROCEDURE — 2580000003 HC RX 258: Performed by: HOSPITALIST

## 2024-09-01 PROCEDURE — G0378 HOSPITAL OBSERVATION PER HR: HCPCS

## 2024-09-01 PROCEDURE — 97116 GAIT TRAINING THERAPY: CPT

## 2024-09-01 PROCEDURE — 96372 THER/PROPH/DIAG INJ SC/IM: CPT

## 2024-09-01 PROCEDURE — 6370000000 HC RX 637 (ALT 250 FOR IP): Performed by: INTERNAL MEDICINE

## 2024-09-01 PROCEDURE — 6360000002 HC RX W HCPCS: Performed by: HOSPITALIST

## 2024-09-01 RX ORDER — INSULIN GLARGINE 100 [IU]/ML
7 INJECTION, SOLUTION SUBCUTANEOUS NIGHTLY
Status: DISCONTINUED | OUTPATIENT
Start: 2024-09-01 | End: 2024-09-01 | Stop reason: HOSPADM

## 2024-09-01 RX ADMIN — SODIUM CHLORIDE, PRESERVATIVE FREE 10 ML: 5 INJECTION INTRAVENOUS at 10:06

## 2024-09-01 RX ADMIN — INSULIN LISPRO 2 UNITS: 100 INJECTION, SOLUTION INTRAVENOUS; SUBCUTANEOUS at 17:36

## 2024-09-01 RX ADMIN — LOSARTAN POTASSIUM 50 MG: 50 TABLET, FILM COATED ORAL at 10:03

## 2024-09-01 RX ADMIN — LOSARTAN POTASSIUM 50 MG: 50 TABLET, FILM COATED ORAL at 15:50

## 2024-09-01 RX ADMIN — ASPIRIN 81 MG CHEWABLE TABLET 81 MG: 81 TABLET CHEWABLE at 09:54

## 2024-09-01 RX ADMIN — BICTEGRAVIR SODIUM, EMTRICITABINE, AND TENOFOVIR ALAFENAMIDE FUMARATE 1 TABLET: 50; 200; 25 TABLET ORAL at 09:58

## 2024-09-01 RX ADMIN — METOPROLOL TARTRATE 25 MG: 25 TABLET, FILM COATED ORAL at 10:03

## 2024-09-01 RX ADMIN — ENOXAPARIN SODIUM 40 MG: 100 INJECTION SUBCUTANEOUS at 09:53

## 2024-09-01 ASSESSMENT — PAIN SCALES - GENERAL: PAINLEVEL_OUTOF10: 0

## 2024-09-01 NOTE — PROGRESS NOTES
Pt did well during shift.  No complaints of nausea or pain.   Numbness and tingling have completely resolved in arm and hand.     Colostomy bag. Pt empties herself.

## 2024-09-01 NOTE — PLAN OF CARE
Problem: Discharge Planning  Goal: Discharge to home or other facility with appropriate resources  9/1/2024 0333 by Liz Short RN  Outcome: Progressing  9/1/2024 0333 by Liz Short RN  Outcome: Progressing  Flowsheets (Taken 8/31/2024 1900)  Discharge to home or other facility with appropriate resources: Identify barriers to discharge with patient and caregiver     Problem: Safety - Adult  Goal: Free from fall injury  9/1/2024 0333 by Liz Short RN  Outcome: Progressing  9/1/2024 0333 by Liz Short RN  Outcome: Progressing  Flowsheets (Taken 8/31/2024 1900)  Free From Fall Injury: Instruct family/caregiver on patient safety     Problem: Skin/Tissue Integrity  Goal: Absence of new skin breakdown  Description: 1.  Monitor for areas of redness and/or skin breakdown  2.  Assess vascular access sites hourly  3.  Every 4-6 hours minimum:  Change oxygen saturation probe site  4.  Every 4-6 hours:  If on nasal continuous positive airway pressure, respiratory therapy assess nares and determine need for appliance change or resting period.  Outcome: Progressing

## 2024-09-01 NOTE — PLAN OF CARE
Problem: Physical Therapy - Adult  Goal: By Discharge: Performs mobility at highest level of function for planned discharge setting.  See evaluation for individualized goals.  Description: FUNCTIONAL STATUS PRIOR TO ADMISSION: Patient was modified independent using a single point cane for functional mobility.    HOME SUPPORT PRIOR TO ADMISSION: The patient lived with daughter but did not require assistance.    Physical Therapy Goals  Initiated 8/30/2024  Pt stated goal: to go home  Pt will be I with LE HEP in 7 days.  Pt will perform bed mobility with [place] in 7 days.  Pt will perform transfers with [place] in 7 days.   Pt will amb 200 feet with LRAD safely with Modified [place] in 7 days.  Pt will ascend/descend 5 steps with B handrail(s) and Supervision in 7 days to safely enter/navigate home.     Outcome: Progressing            PHYSICAL THERAPY TREATMENT     Patient: Hannah Colon (84 y.o. female)  Date: 9/1/2024  Diagnosis: Stroke-like symptoms [R29.90] Stroke-like symptoms      Precautions: Fall Risk, General Precautions                      Recommendations for nursing mobility: Out of bed to chair for meals and Amb in hallway    In place during session: None  Chart, physical therapy assessment, plan of care and goals were reviewed.  ASSESSMENT  Patient continues with skilled PT services and is progressing towards goals. Pt semi-supine upon PT arrival, agreeable to session. Pt A&O x 4. Visitors present in room with permission from patient. (See below for objective details and assist levels).     Overall pt tolerated session good today with gait training performed in hallway for 300 feet x 1 with CGA for safety and no AD. 1 minor LOB towards end of gait training but patient able to self correct. Left patient back in bed with visitors present in room. Will continue to benefit from skilled PT services, and will continue to progress as tolerated. Current PT DC recommendation Intermittent physical

## 2024-09-01 NOTE — DISCHARGE SUMMARY
Physician Discharge Summary     Patient ID:    Hannah Colon  [id number]  84 y.o.  1940    Admit date: 8/29/2024    Discharge date : 9/1/2024    Final Diagnoses:   Stroke-like symptoms [R29.90]      Reason for Hospitalization:    Concern for stroke     Hospital Course:     Hannah Colon is a 84 y.o. female with a hx of HTN, HIV , DM who presented to the ED reporting sudden onset RUE weakness and paresthesia in her hand. Pt was afebrile, hemodynamically stable and in no resp distress on arrival to ED. Lab work was unremarkable and no acute ICH or infarct/obstruction noted on CT/CTA brain. An incidental Rt anterior cranial fossa meningioma (1.8 x 1.7 cm) visualized on CT Brain. Tele neuro evaluation completed. An MRI brain was done showing no acute brain infarctions. Her evaluation also included an unremarkable echocardiogram. PT and OT evaluation recommending home PT.     Given her BP during the hospital, the patient was asked to hold Hydralazine and HCTZ until she is evaluated by her PCP whom she was instructed to see within 2 weeks.     Discharge Medications:      Medication List        CONTINUE taking these medications      atorvastatin 20 MG tablet  Commonly known as: LIPITOR     Biktarvy -25 MG Tabs per tablet  Generic drug: bictegravir-emtricitab-tenofovir alafenamide     cyproheptadine 4 MG tablet  Commonly known as: PERIACTIN     gabapentin 300 MG capsule  Commonly known as: NEURONTIN     losartan 50 MG tablet  Commonly known as: COZAAR     metFORMIN 500 MG tablet  Commonly known as: GLUCOPHAGE     metoprolol succinate 25 MG extended release tablet  Commonly known as: TOPROL XL            STOP taking these medications      amLODIPine 5 MG tablet  Commonly known as: NORVASC     furosemide 20 MG tablet  Commonly known as: LASIX     hydrALAZINE 50 MG tablet  Commonly known as: APRESOLINE     hydroCHLOROthiazide 25 MG tablet  Commonly known as: HYDRODIURIL     potassium chloride 10

## 2024-09-01 NOTE — PROGRESS NOTES
Dc instructions given to patient and daughter. Alert and oriented x4 . On room air. No distress noted. Dc home with family via wheelchair. Home health is ordered. Patient and daughter instructed that  will contact them tomorrow and get home health set up

## 2024-09-01 NOTE — PLAN OF CARE
Problem: Discharge Planning  Goal: Discharge to home or other facility with appropriate resources  Outcome: Progressing  Flowsheets (Taken 8/31/2024 1900)  Discharge to home or other facility with appropriate resources: Identify barriers to discharge with patient and caregiver     Problem: Safety - Adult  Goal: Free from fall injury  Outcome: Progressing  Flowsheets (Taken 8/31/2024 1900)  Free From Fall Injury: Instruct family/caregiver on patient safety

## 2024-10-20 ENCOUNTER — HOSPITAL ENCOUNTER (EMERGENCY)
Facility: HOSPITAL | Age: 84
Discharge: HOME | End: 2024-10-20
Attending: EMERGENCY MEDICINE
Payer: MEDICARE

## 2024-10-20 ENCOUNTER — APPOINTMENT (OUTPATIENT)
Facility: HOSPITAL | Age: 84
End: 2024-10-20
Payer: MEDICARE

## 2024-10-20 VITALS
BODY MASS INDEX: 25.11 KG/M2 | HEIGHT: 67 IN | WEIGHT: 160 LBS | HEART RATE: 70 BPM | RESPIRATION RATE: 16 BRPM | OXYGEN SATURATION: 100 % | TEMPERATURE: 97.7 F | SYSTOLIC BLOOD PRESSURE: 162 MMHG | DIASTOLIC BLOOD PRESSURE: 81 MMHG

## 2024-10-20 DIAGNOSIS — G60.9 IDIOPATHIC PERIPHERAL NEUROPATHY: ICD-10-CM

## 2024-10-20 DIAGNOSIS — R20.0 NUMBNESS: ICD-10-CM

## 2024-10-20 DIAGNOSIS — E87.6 HYPOKALEMIA: ICD-10-CM

## 2024-10-20 DIAGNOSIS — R42 DIZZINESS: Primary | ICD-10-CM

## 2024-10-20 DIAGNOSIS — G56.01 CARPAL TUNNEL SYNDROME OF RIGHT WRIST: ICD-10-CM

## 2024-10-20 LAB
ALBUMIN SERPL-MCNC: 3.6 G/DL (ref 3.5–5)
ALBUMIN/GLOB SERPL: 0.9 (ref 1.1–2.2)
ALP SERPL-CCNC: 78 U/L (ref 45–117)
ALT SERPL-CCNC: 17 U/L (ref 12–78)
ANION GAP SERPL CALC-SCNC: 6 MMOL/L (ref 2–12)
APPEARANCE UR: CLEAR
APTT PPP: 21.9 SEC (ref 22.1–31)
AST SERPL W P-5'-P-CCNC: 20 U/L (ref 15–37)
BACTERIA URNS QL MICRO: NEGATIVE /HPF
BASOPHILS # BLD: 0 K/UL (ref 0–0.1)
BASOPHILS NFR BLD: 0 % (ref 0–1)
BILIRUB SERPL-MCNC: 0.4 MG/DL (ref 0.2–1)
BILIRUB UR QL: NEGATIVE
BUN SERPL-MCNC: 21 MG/DL (ref 6–20)
BUN/CREAT SERPL: 26 (ref 12–20)
CA-I BLD-MCNC: 10.4 MG/DL (ref 8.5–10.1)
CHLORIDE SERPL-SCNC: 104 MMOL/L (ref 97–108)
CO2 SERPL-SCNC: 33 MMOL/L (ref 21–32)
COLOR UR: ABNORMAL
CREAT SERPL-MCNC: 0.82 MG/DL (ref 0.55–1.02)
DIFFERENTIAL METHOD BLD: NORMAL
EOSINOPHIL # BLD: 0 K/UL (ref 0–0.4)
EOSINOPHIL NFR BLD: 1 % (ref 0–7)
EPITH CASTS URNS QL MICRO: ABNORMAL /LPF
ERYTHROCYTE [DISTWIDTH] IN BLOOD BY AUTOMATED COUNT: 14.4 % (ref 11.5–14.5)
GLOBULIN SER CALC-MCNC: 4.2 G/DL (ref 2–4)
GLUCOSE BLD STRIP.AUTO-MCNC: 112 MG/DL (ref 65–100)
GLUCOSE SERPL-MCNC: 111 MG/DL (ref 65–100)
GLUCOSE UR STRIP.AUTO-MCNC: NEGATIVE MG/DL
HCT VFR BLD AUTO: 37.8 % (ref 35–47)
HGB BLD-MCNC: 12 G/DL (ref 11.5–16)
HGB UR QL STRIP: NEGATIVE
IMM GRANULOCYTES # BLD AUTO: 0 K/UL (ref 0–0.04)
IMM GRANULOCYTES NFR BLD AUTO: 0 % (ref 0–0.5)
INR PPP: 1 (ref 0.9–1.1)
KETONES UR QL STRIP.AUTO: NEGATIVE MG/DL
LEUKOCYTE ESTERASE UR QL STRIP.AUTO: ABNORMAL
LYMPHOCYTES # BLD: 1 K/UL (ref 0.8–3.5)
LYMPHOCYTES NFR BLD: 23 % (ref 12–49)
MCH RBC QN AUTO: 28.1 PG (ref 26–34)
MCHC RBC AUTO-ENTMCNC: 31.7 G/DL (ref 30–36.5)
MCV RBC AUTO: 88.5 FL (ref 80–99)
MONOCYTES # BLD: 0.3 K/UL (ref 0–1)
MONOCYTES NFR BLD: 7 % (ref 5–13)
NEUTS SEG # BLD: 2.9 K/UL (ref 1.8–8)
NEUTS SEG NFR BLD: 69 % (ref 32–75)
NITRITE UR QL STRIP.AUTO: NEGATIVE
PERFORMED BY:: ABNORMAL
PH UR STRIP: 7 (ref 5–8)
PLATELET # BLD AUTO: 179 K/UL (ref 150–400)
PMV BLD AUTO: 9.7 FL (ref 8.9–12.9)
POTASSIUM SERPL-SCNC: 3.2 MMOL/L (ref 3.5–5.1)
PROT SERPL-MCNC: 7.8 G/DL (ref 6.4–8.2)
PROT UR STRIP-MCNC: NEGATIVE MG/DL
PROTHROMBIN TIME: 9.6 SEC (ref 9–11.1)
RBC # BLD AUTO: 4.27 M/UL (ref 3.8–5.2)
RBC #/AREA URNS HPF: ABNORMAL /HPF (ref 0–5)
SODIUM SERPL-SCNC: 143 MMOL/L (ref 136–145)
SP GR UR REFRACTOMETRY: 1.01 (ref 1–1.03)
THERAPEUTIC RANGE: ABNORMAL SEC (ref 58–77)
TROPONIN I SERPL HS-MCNC: 12 NG/L (ref 0–51)
URINE CULTURE IF INDICATED: ABNORMAL
UROBILINOGEN UR QL STRIP.AUTO: 0.1 EU/DL (ref 0.2–1)
WBC # BLD AUTO: 4.2 K/UL (ref 3.6–11)
WBC URNS QL MICRO: ABNORMAL /HPF (ref 0–4)

## 2024-10-20 PROCEDURE — 82962 GLUCOSE BLOOD TEST: CPT

## 2024-10-20 PROCEDURE — 85730 THROMBOPLASTIN TIME PARTIAL: CPT

## 2024-10-20 PROCEDURE — 80053 COMPREHEN METABOLIC PANEL: CPT

## 2024-10-20 PROCEDURE — 6370000000 HC RX 637 (ALT 250 FOR IP): Performed by: EMERGENCY MEDICINE

## 2024-10-20 PROCEDURE — 85610 PROTHROMBIN TIME: CPT

## 2024-10-20 PROCEDURE — 81001 URINALYSIS AUTO W/SCOPE: CPT

## 2024-10-20 PROCEDURE — 70450 CT HEAD/BRAIN W/O DYE: CPT

## 2024-10-20 PROCEDURE — 93005 ELECTROCARDIOGRAM TRACING: CPT | Performed by: EMERGENCY MEDICINE

## 2024-10-20 PROCEDURE — 84484 ASSAY OF TROPONIN QUANT: CPT

## 2024-10-20 PROCEDURE — 36415 COLL VENOUS BLD VENIPUNCTURE: CPT

## 2024-10-20 PROCEDURE — 99284 EMERGENCY DEPT VISIT MOD MDM: CPT

## 2024-10-20 PROCEDURE — 85025 COMPLETE CBC W/AUTO DIFF WBC: CPT

## 2024-10-20 RX ORDER — POTASSIUM CHLORIDE 750 MG/1
40 TABLET, EXTENDED RELEASE ORAL ONCE
Status: COMPLETED | OUTPATIENT
Start: 2024-10-20 | End: 2024-10-20

## 2024-10-20 RX ADMIN — POTASSIUM CHLORIDE 40 MEQ: 750 TABLET, EXTENDED RELEASE ORAL at 16:30

## 2024-10-20 ASSESSMENT — PAIN - FUNCTIONAL ASSESSMENT: PAIN_FUNCTIONAL_ASSESSMENT: NONE - DENIES PAIN

## 2024-10-20 NOTE — ED PROVIDER NOTES
[facility] EMERGENCY DEPT  EMERGENCY DEPARTMENT HISTORY AND PHYSICAL EXAM      Date: 10/20/2024  Patient Name: Hannah Colon  MRN: [id number]  YOB: 1940  Date of evaluation: 10/20/2024  Provider: Emily Jenkins MD   Note Started: 4:09 PM EDT 10/20/24    HISTORY OF PRESENT ILLNESS     Chief Complaint   Patient presents with    Dizziness    Numbness       History Provided By: Patient    HPI: Hannah Colon is a 84 y.o. female with h/o peripheral neuropathy, carpal tunnel right hand, HTN, HIV presents from [facility] this morning during which time she noticed tingling into her hands, consistent with her known neuropathy and feeling a little unsteady on her feet so her daughter decided to bring her in for evaluation. She has had off and on dizziness for months and is under the care of a neurologist. She was admitted for CVA work up in August during which time no aucte stroke was found but she did have a meningioma. She had testing Friday, but cannot recall what it was and has not heard results at this time. She otherwise denies HA, fevers, chest pain, being off-balance, vision changes or other focal findings. The tingling into her hands is baseline and unchanged per her report.    PAST MEDICAL HISTORY   Past Medical History:  Past Medical History:   Diagnosis Date    Diabetic neuropathy (HCC)     Essential hypertension     History of rectal cancer     HIV (human immunodeficiency virus infection) (HCC)     Hypercholesterolemia     S/P exploratory laparotomy 10/28/2021    Tinnitus 6/15/2020    Type 2 diabetes mellitus with complication, without long-term current use of insulin (HCC)        Past Surgical History:  Past Surgical History:   Procedure Laterality Date    COLONOSCOPY      GI      Colon surgery colostomy    GYN      hysterectomy    [place] UNLISTED PROCEDURE ABDOMEN PERITONEUM & OMENTUM      two hernia repairs       Family History:  Family History   Problem Relation Age of Onset    Cancer Father     Diabetes Mother

## 2024-10-20 NOTE — ED TRIAGE NOTES
Pt states decreased sensation to right 2nd & 3rd fingers x1 month, hx diabetic neuropath; also c/o swelling in ankles and intermittent episodes of dizziness; ambulated with the assistance of a cane    Hx stroke-like symptoms on 8/29/24; last seen by Neurologist 1 week ago    AccuCheck in triage 112

## 2024-10-22 LAB
EKG ATRIAL RATE: 76 BPM
EKG DIAGNOSIS: NORMAL
EKG P AXIS: 18 DEGREES
EKG P-R INTERVAL: 166 MS
EKG Q-T INTERVAL: 364 MS
EKG QRS DURATION: 84 MS
EKG QTC CALCULATION (BAZETT): 409 MS
EKG R AXIS: -6 DEGREES
EKG T AXIS: 43 DEGREES
EKG VENTRICULAR RATE: 76 BPM

## 2024-11-15 RX ORDER — BICTEGRAVIR SODIUM, EMTRICITABINE, AND TENOFOVIR ALAFENAMIDE FUMARATE 50; 200; 25 MG/1; MG/1; MG/1
1 TABLET ORAL DAILY
Qty: 30 TABLET | OUTPATIENT
Start: 2024-11-15

## 2024-11-18 ENCOUNTER — TELEPHONE (OUTPATIENT)
Age: 84
End: 2024-11-18

## 2024-11-18 DIAGNOSIS — Z21 ASYMPTOMATIC HIV INFECTION, WITH NO HISTORY OF HIV-RELATED ILLNESS (HCC): Primary | ICD-10-CM

## 2024-11-18 RX ORDER — BICTEGRAVIR SODIUM, EMTRICITABINE, AND TENOFOVIR ALAFENAMIDE FUMARATE 50; 200; 25 MG/1; MG/1; MG/1
1 TABLET ORAL DAILY
Qty: 30 TABLET | Refills: 5 | Status: SHIPPED | OUTPATIENT
Start: 2024-11-18 | End: 2024-12-18

## 2024-11-18 NOTE — TELEPHONE ENCOUNTER
PATIENT WALK IN     Patients daughter walked in   Patient is out of medication and would like a prescription sent to [facility] [address] [place]

## 2025-02-25 ENCOUNTER — OFFICE VISIT (OUTPATIENT)
Age: 85
End: 2025-02-25
Payer: MEDICARE

## 2025-02-25 VITALS
WEIGHT: 167.6 LBS | DIASTOLIC BLOOD PRESSURE: 56 MMHG | HEIGHT: 67 IN | BODY MASS INDEX: 26.3 KG/M2 | SYSTOLIC BLOOD PRESSURE: 130 MMHG | TEMPERATURE: 98.2 F | HEART RATE: 113 BPM | RESPIRATION RATE: 16 BRPM

## 2025-02-25 DIAGNOSIS — R20.2 NUMBNESS AND TINGLING IN RIGHT HAND: ICD-10-CM

## 2025-02-25 DIAGNOSIS — R20.0 NUMBNESS AND TINGLING IN RIGHT HAND: ICD-10-CM

## 2025-02-25 DIAGNOSIS — B20 CURRENTLY ASYMPTOMATIC HIV INFECTION, WITH HISTORY OF HIV-RELATED ILLNESS (HCC): ICD-10-CM

## 2025-02-25 DIAGNOSIS — B20 CURRENTLY ASYMPTOMATIC HIV INFECTION, WITH HISTORY OF HIV-RELATED ILLNESS (HCC): Primary | ICD-10-CM

## 2025-02-25 DIAGNOSIS — R53.83 OTHER FATIGUE: ICD-10-CM

## 2025-02-25 PROCEDURE — G8419 CALC BMI OUT NRM PARAM NOF/U: HCPCS | Performed by: INTERNAL MEDICINE

## 2025-02-25 PROCEDURE — 1123F ACP DISCUSS/DSCN MKR DOCD: CPT | Performed by: INTERNAL MEDICINE

## 2025-02-25 PROCEDURE — 1126F AMNT PAIN NOTED NONE PRSNT: CPT | Performed by: INTERNAL MEDICINE

## 2025-02-25 PROCEDURE — G8427 DOCREV CUR MEDS BY ELIG CLIN: HCPCS | Performed by: INTERNAL MEDICINE

## 2025-02-25 PROCEDURE — 1159F MED LIST DOCD IN RCRD: CPT | Performed by: INTERNAL MEDICINE

## 2025-02-25 PROCEDURE — 99214 OFFICE O/P EST MOD 30 MIN: CPT | Performed by: INTERNAL MEDICINE

## 2025-02-25 PROCEDURE — 1036F TOBACCO NON-USER: CPT | Performed by: INTERNAL MEDICINE

## 2025-02-25 PROCEDURE — 1090F PRES/ABSN URINE INCON ASSESS: CPT | Performed by: INTERNAL MEDICINE

## 2025-02-25 PROCEDURE — G8400 PT W/DXA NO RESULTS DOC: HCPCS | Performed by: INTERNAL MEDICINE

## 2025-02-25 RX ORDER — HYDROCHLOROTHIAZIDE 25 MG/1
TABLET ORAL
COMMUNITY

## 2025-02-25 RX ORDER — MELOXICAM 15 MG/1
TABLET ORAL
COMMUNITY
Start: 2025-02-03

## 2025-02-25 RX ORDER — BICTEGRAVIR SODIUM, EMTRICITABINE, AND TENOFOVIR ALAFENAMIDE FUMARATE 50; 200; 25 MG/1; MG/1; MG/1
TABLET ORAL
COMMUNITY

## 2025-02-25 RX ORDER — AMLODIPINE BESYLATE 2.5 MG/1
TABLET ORAL
COMMUNITY

## 2025-02-25 NOTE — PROGRESS NOTES
Chief Complaint   Patient presents with    Follow-up     \"Have you been to the ER, urgent care clinic since your last visit?  Hospitalized since your last visit?\"    NO    “Have you seen or consulted any other health care providers outside of [facility] since your last visit?”    NO

## 2025-02-26 ASSESSMENT — ENCOUNTER SYMPTOMS
GASTROINTESTINAL NEGATIVE: 1
RESPIRATORY NEGATIVE: 1

## 2025-02-26 NOTE — PROGRESS NOTES
HPI: Pleasant 84 y.o BM presenting for a routine f/u of HIV infection, accompanied by her grand daughter, last seen in 08/2024.  -She was diagnosed with HIV infection in 2016, previously a pt of Dr Wharton, establish care with me in 4/2021  -She has no h/o HIV associated OI, acquired or transmitted resistance to cART  -Her current regimen is Biktarvy, previously on Sustiva and Truvada, change was made by previous provider to simplify regimen   -Her HIV is well-controlled, She had a lapse in renewing her insurance application in 2023 and missed a month of her antiviral therapy  -Most recent blood work in 7/2024 revealed HIV RNL of 180 copies/ml, and T cell count of 320 (26.7%), had been undetectable previously   -She admits to 100% compliance with her antiviral, and denies experiencing difficulty getting monthly refills  -She reports numbness involving her right thumb and index fingers, denies recent trauma to ext   -She denies acute illnesses or hospitalizations since her last encounter with me    ROS  Review of Systems   Constitutional: Negative.    Respiratory: Negative.     Cardiovascular: Negative.    Gastrointestinal: Negative.    Musculoskeletal: Negative.    Skin: Negative.    Neurological:         Numbness and tingling involving right thumb and index fingers   Hematological: Negative.    Psychiatric/Behavioral: Negative.       Past Medical History:   Diagnosis Date    Diabetic neuropathy (HCC)     Essential hypertension     History of rectal cancer     HIV (human immunodeficiency virus infection) (HCC)     Hypercholesterolemia     S/P exploratory laparotomy 10/28/2021    Tinnitus 6/15/2020    Type 2 diabetes mellitus with complication, without long-term current use of insulin (HCC)         Past Surgical History:   Procedure Laterality Date    COLONOSCOPY      GI      Colon surgery colostomy    GYN      hysterectomy    [place] UNLISTED PROCEDURE ABDOMEN PERITONEUM & OMENTUM      two hernia repairs        Social

## 2025-03-08 ENCOUNTER — HOSPITAL ENCOUNTER (OUTPATIENT)
Facility: HOSPITAL | Age: 85
Discharge: HOME | End: 2025-03-11

## 2025-03-09 LAB
HBV CORE AB SERPL QL IA: NEGATIVE
HBV SURFACE AB SER-ACNC: <3.5 MIU/ML
HCV IGG SERPL QL IA: NON REACTIVE
RPR SER QL: NON REACTIVE

## 2025-03-10 LAB
25(OH)D3+25(OH)D2 SERPL-MCNC: 34.1 NG/ML (ref 30–100)
ALBUMIN SERPL-MCNC: 4.4 G/DL (ref 3.7–4.7)
ALP SERPL-CCNC: 95 IU/L (ref 44–121)
ALT SERPL-CCNC: 12 IU/L (ref 0–32)
AST SERPL-CCNC: 22 IU/L (ref 0–40)
BILIRUB SERPL-MCNC: 0.3 MG/DL (ref 0–1.2)
BUN SERPL-MCNC: 21 MG/DL (ref 8–27)
BUN/CREAT SERPL: 28 (ref 12–28)
CALCIUM SERPL-MCNC: 10.3 MG/DL (ref 8.7–10.3)
CHLORIDE SERPL-SCNC: 103 MMOL/L (ref 96–106)
CO2 SERPL-SCNC: 23 MMOL/L (ref 20–29)
CREAT SERPL-MCNC: 0.74 MG/DL (ref 0.57–1)
EGFRCR SERPLBLD CKD-EPI 2021: 81 ML/MIN/1.73
GLOBULIN SER CALC-MCNC: 3.1 G/DL (ref 1.5–4.5)
GLUCOSE SERPL-MCNC: 87 MG/DL (ref 70–99)
POTASSIUM SERPL-SCNC: 4.1 MMOL/L (ref 3.5–5.2)
PROT SERPL-MCNC: 7.5 G/DL (ref 6–8.5)
SODIUM SERPL-SCNC: 140 MMOL/L (ref 134–144)

## 2025-03-11 LAB
FOLATE SERPL-MCNC: >20 NG/ML
VIT B12 SERPL-MCNC: 516 PG/ML (ref 232–1245)

## 2025-03-12 LAB
GAMMA INTERFERON BACKGROUND BLD IA-ACNC: 0.05 IU/ML
M TB IFN-G BLD-IMP: NEGATIVE
M TB IFN-G CD4+ BCKGRND COR BLD-ACNC: 0.05 IU/ML
M TB IFN-G CD4+CD8+ BCKGRND COR BLD-ACNC: 0.05 IU/ML
MITOGEN IGNF BCKGRD COR BLD-ACNC: 3.1 IU/ML
QUANTIFERON, INCUBATION: NORMAL
SERVICE CMNT-IMP: NORMAL

## 2025-05-25 DIAGNOSIS — Z21 ASYMPTOMATIC HIV INFECTION, WITH NO HISTORY OF HIV-RELATED ILLNESS (HCC): Primary | ICD-10-CM

## 2025-05-27 ENCOUNTER — TELEPHONE (OUTPATIENT)
Age: 85
End: 2025-05-27

## 2025-05-27 RX ORDER — BICTEGRAVIR SODIUM, EMTRICITABINE, AND TENOFOVIR ALAFENAMIDE FUMARATE 50; 200; 25 MG/1; MG/1; MG/1
1 TABLET ORAL DAILY
Qty: 30 TABLET | Refills: 5 | Status: SHIPPED | OUTPATIENT
Start: 2025-05-27 | End: 2025-06-26

## 2025-05-27 NOTE — TELEPHONE ENCOUNTER
Patient called in to get refill completely out of meds     BIKTARVY -25 MG TABS per tablet     [id number] - [place], [place] - [address] - P [phone] - F [phone] [[id number]]

## (undated) DEVICE — SOLUTION IRRIG 1000ML 0.9% SOD CHL USP POUR PLAS BTL

## (undated) DEVICE — TRAY URIN CATH PED 16FR BLLN 5CC INDWL STR TIP INF CTRL

## (undated) DEVICE — YANKAUER,BULB TIP,W/O VENT,RIGID,STERILE: Brand: MEDLINE

## (undated) DEVICE — SUTURE PDS II SZ 1 L96IN ABSRB VLT TP-1 L65MM 1/2 CIR Z880G

## (undated) DEVICE — WET SKIN PREP TRAY: Brand: MEDLINE INDUSTRIES, INC.

## (undated) DEVICE — DRESSING ADH N ADH 8X35 IN 6X175 IN SFT CLTH MEDIPORE +

## (undated) DEVICE — SOUTHSIDE TURNOVER: Brand: MEDLINE INDUSTRIES, INC.

## (undated) DEVICE — 3M™ SURGICAL CLIPPER WITH PIVOTING HEAD, 9660, 50/CASE: Brand: 3M™

## (undated) DEVICE — BASIC SINGLE BASIN-LF: Brand: MEDLINE INDUSTRIES, INC.

## (undated) DEVICE — BLADE ELECTRODE: Brand: EDGE

## (undated) DEVICE — TOWEL SURG W17XL27IN STD BLU COT NONFENESTRATED PREWASHED

## (undated) DEVICE — SUT ETHLN 2-0 18IN FS BLK --

## (undated) DEVICE — INTENDED FOR TISSUE SEPARATION, AND OTHER PROCEDURES THAT REQUIRE A SHARP SURGICAL BLADE TO PUNCTURE OR CUT.: Brand: BARD-PARKER ® CARBON RIB-BACK BLADES

## (undated) DEVICE — STAPLER SKIN H3.9MM WIRE DIA0.58MM CRWN 6.9MM 35 STPL FIX

## (undated) DEVICE — GLOVE ORANGE PI 7   MSG9070

## (undated) DEVICE — PENROSE DRAIN 18 X .5" SILICONE: Brand: MEDLINE

## (undated) DEVICE — SUTURE PROL SZ 1 L30IN NONABSORBABLE BLU L40MM CT 1/2 CIR 8435H

## (undated) DEVICE — SPONGE LAP SFT 18X18 IN X RAY DETECTABLE

## (undated) DEVICE — SUTURE PERMAHAND SZ 3-0 L18IN NONABSORBABLE BLK L26MM SH C013D

## (undated) DEVICE — STAPLER SKIN H3.9MM WIRE DIA0.58MM CRWN 6.9MM 35 STPL ROT

## (undated) DEVICE — SUTURE VCRL SZ 3-0 L36IN ABSRB UD L36MM CT-1 1/2 CIR J944H

## (undated) DEVICE — MAJOR LAP PROCEDURE PACK: Brand: MEDLINE INDUSTRIES, INC.

## (undated) DEVICE — STAPLER INT L75MM CUT LN L73MM STPL LN L77MM BLU B FRM 8

## (undated) DEVICE — TOWEL,OR,DSP,ST,BLUE,DLX,6/PK,12PK/CS: Brand: MEDLINE

## (undated) DEVICE — DRSG AQUACEL SURG 3.5 X 10IN -- CONVERT TO ITEM 370183

## (undated) DEVICE — GLOVE SURG SZ 7 L12IN FNGR THK79MIL GRN LTX FREE

## (undated) DEVICE — DRAPE IRRIG FLD WRM W44XL66IN W/ AORN STD PRTBL INTRATEMP

## (undated) DEVICE — SYRINGE IRRIG 60ML SFT PLIABLE BLB EZ TO GRP 1 HND USE W/

## (undated) DEVICE — CURVED, LARGE JAW, OPEN SEALER/DIVIDER NANO-COATED: Brand: LIGASURE IMPACT

## (undated) DEVICE — SUT VCRL + 2-0 27IN SH UD --

## (undated) DEVICE — SEALER ENDOSCP NANO COAT OPN DIV CRV L JAW LIGASURE IMPACT

## (undated) DEVICE — Device: Brand: JELCO

## (undated) DEVICE — GARMENT,MEDLINE,DVT,INT,CALF,MED, GEN2: Brand: MEDLINE

## (undated) DEVICE — SYR 20ML LL STRL LF --